# Patient Record
Sex: FEMALE | Race: WHITE | Employment: OTHER | URBAN - METROPOLITAN AREA
[De-identification: names, ages, dates, MRNs, and addresses within clinical notes are randomized per-mention and may not be internally consistent; named-entity substitution may affect disease eponyms.]

---

## 2017-01-23 ENCOUNTER — TRANSFERRED RECORDS (OUTPATIENT)
Dept: HEALTH INFORMATION MANAGEMENT | Facility: CLINIC | Age: 72
End: 2017-01-23

## 2017-01-25 ENCOUNTER — TRANSFERRED RECORDS (OUTPATIENT)
Dept: HEALTH INFORMATION MANAGEMENT | Facility: CLINIC | Age: 72
End: 2017-01-25

## 2017-02-03 ENCOUNTER — TELEPHONE (OUTPATIENT)
Dept: NURSING | Facility: CLINIC | Age: 72
End: 2017-02-03

## 2017-02-03 NOTE — TELEPHONE ENCOUNTER
"Call Type: Triage Call    Presenting Problem: \" I have  had a nose bleed since 12: 50pm, I keep  pinching my nose and packing it with kleenex , then when I pull the  kleenex out, a clot comes out and it starts to bleed again. I use a  CPAP machine, so my nose is always dry. \"Advised to pinch nose for 2  times on the outside for 10 minutes each time,  and not to use  kleenex , if it doesn't stop to go to the ED, as patient is on  Plavix.   Advised to call back with questions and concerns. Patient  holding nose during phone call and stating it has already almost  stopped.  Triage Note:  Guideline Title: Nosebleed  Recommended Disposition: Provide Home/Self Care  Original Inclination: Wanted to speak with a nurse  Override Disposition:  Intended Action: Follow advice given  Physician Contacted: No  Single episode of bleeding AND has not applied constant direct pressure for 10  minutes by a clock ?  YES  Lacerations on nose or face ? NO  New or worsening signs and symptoms that may indicate shock ? NO  Unexplained new bruising on other parts of body or other unexplained bleeding  (from gums, in urine or stool) ? NO  Unconscious now or within last hour OR for more than 5 minutes at time of injury,  after major trauma to head, neck or face ? NO  Recurrent bleeding (3 or more episodes in last week) that stops with 10 minutes of  direct pressure or stops spontaneously ? NO  Nasal bleeding not controlled after 2 attempts of constant direct pressure for a  full 10 minutes by the clock (each attempt) ? NO  Bleeding from one or both nostrils initially controlled with direct pressure AND  another episode of bleeding the same day ? NO  Unbearable headache ? NO  Self measured blood pressure 180/120 or higher ? NO  Blunt and/or penetrating injury to face involving more than one facial structure ?  NO  Nosebleed as a result of trauma to the nose/face ? NO  Blood is draining down back of throat causing choking sensation, " difficulty  breathing, or coughing up or vomiting blood ? NO  Unable to control nosebleed (2 attempts of constant direct pressure for a full 10  minutes by a clock) AND individual is taking blood thinning medications OR has an  underlying condition affecting blood clotting ? NO  Known foreign body in nose and unsuccessful removal attempt ? NO  Nosebleed is now controlled AND individual is taking blood thinning medications OR  has an underlying condition affecting blood clotting ? NO  Physician Instructions:  Care Advice: Call provider if symptoms continue, worsen, or new symptoms  develop.  Keep the head higher than the level of the heart.  Sit up or lie back a  little with the head elevated.  SYMPTOM / CONDITION MANAGEMENT  Nosebleed Care: - Keep person calm  agitation may increase bleeding. - Sit in an upright position and tilt head  forward. - Pinch all the soft parts of the nose just below the bony portion  of the nose with the thumb and side of bent index finger. - Maintain firm  pressure for a full 10 minutes by a clock. - Breathe through the mouth. -  If bleeding is not stopped, apply pressure for another full 10 minutes by a  clock. - Apply a cloth-covered ice pack to nose and cheeks.  GO TO THE ED IMMEDIATELY if bleeding continues after attempts of constant  direct pressure to the nose total 20 minutes. Each attempt should be  constant direct pressure for a full 10 minutes by a clock.  Post Nosebleed Care: * Avoid blowing nose for 12 hours after bleeding  episode. * Do not pick nose. * Do not strain or bend down to lift anything  heavy for 3 days. * Use a cool mist humidifier to moisten room air. *  Elevate head on 3 pillows when lying down for the next 3 days. * Consider  use of nonprescription nasal mucus membrane ointment or spray per label or  pharmacist's instructions

## 2017-02-22 ENCOUNTER — TELEPHONE (OUTPATIENT)
Dept: FAMILY MEDICINE | Facility: CLINIC | Age: 72
End: 2017-02-22

## 2017-02-22 ENCOUNTER — TRANSFERRED RECORDS (OUTPATIENT)
Dept: HEALTH INFORMATION MANAGEMENT | Facility: CLINIC | Age: 72
End: 2017-02-22

## 2017-02-22 ENCOUNTER — OFFICE VISIT (OUTPATIENT)
Dept: FAMILY MEDICINE | Facility: CLINIC | Age: 72
End: 2017-02-22
Payer: COMMERCIAL

## 2017-02-22 VITALS
TEMPERATURE: 97.6 F | SYSTOLIC BLOOD PRESSURE: 132 MMHG | HEART RATE: 96 BPM | DIASTOLIC BLOOD PRESSURE: 74 MMHG | BODY MASS INDEX: 20.8 KG/M2 | HEIGHT: 62 IN | WEIGHT: 113 LBS | OXYGEN SATURATION: 96 %

## 2017-02-22 DIAGNOSIS — M85.80 OSTEOPENIA: Chronic | ICD-10-CM

## 2017-02-22 DIAGNOSIS — L30.1 DYSHIDROSIS: ICD-10-CM

## 2017-02-22 DIAGNOSIS — E78.5 DYSLIPIDEMIA: Primary | ICD-10-CM

## 2017-02-22 DIAGNOSIS — F41.8 DEPRESSION WITH ANXIETY: Primary | ICD-10-CM

## 2017-02-22 DIAGNOSIS — E78.5 DYSLIPIDEMIA: ICD-10-CM

## 2017-02-22 DIAGNOSIS — F51.01 PRIMARY INSOMNIA: ICD-10-CM

## 2017-02-22 LAB
CHOLEST SERPL-MCNC: 205 MG/DL
CK SERPL-CCNC: 42 U/L (ref 30–225)
HDLC SERPL-MCNC: 69 MG/DL
LDLC SERPL CALC-MCNC: 125 MG/DL
NONHDLC SERPL-MCNC: 136 MG/DL
TRIGL SERPL-MCNC: 55 MG/DL

## 2017-02-22 PROCEDURE — 80061 LIPID PANEL: CPT | Performed by: INTERNAL MEDICINE

## 2017-02-22 PROCEDURE — 82306 VITAMIN D 25 HYDROXY: CPT | Performed by: INTERNAL MEDICINE

## 2017-02-22 PROCEDURE — 99213 OFFICE O/P EST LOW 20 MIN: CPT | Performed by: FAMILY MEDICINE

## 2017-02-22 PROCEDURE — 82550 ASSAY OF CK (CPK): CPT | Performed by: INTERNAL MEDICINE

## 2017-02-22 PROCEDURE — 36415 COLL VENOUS BLD VENIPUNCTURE: CPT | Performed by: INTERNAL MEDICINE

## 2017-02-22 NOTE — TELEPHONE ENCOUNTER
Called pharmacy and clarified dispense amount, advised pharmacy to change dispense amount per directions on prescription.  Pharmacy will fill prescription   Galina Garza RN

## 2017-02-22 NOTE — TELEPHONE ENCOUNTER
Reason for Call:  Other prescription    Detailed comments: Patient called to clarify directions for Sertraline. Please call [phone]    Phone Number Patient can be reached at: Home number on file [phone] (home)    Best Time: any    Can we leave a detailed message on this number? YES    Call taken on 2/22/2017 at 11:41 AM by Trinity Powell

## 2017-02-22 NOTE — PROGRESS NOTES
"Chief Complaint   Patient presents with     Derm Problem     Perspiration     Headache     SUBJECTIVE:  This 71 year old female is here today to discuss that she would like to taper off zoloft. She was put on it after her open heart surgery when she had a lot of anxiety and insomnia. She is feeling much better, but now is experiencing sweats at night that wake her up when she gets chilled. She read that zoloft can cause night sweats and she never had night sweats prior to using zoloft. She really wants guidance to taper off of it. She has never had headaches either and is convinced that the zoloft is causing headaches.   She needs lipid panel done today per her cardiologist. She had developed leg cramps on lipitor and finally went off lipitor and the leg cramps resolved. Now cardiologist wants to try crestor but wants to see what her cholesterol is today.   She has a small sharp area under her skin by her right clavicle. That was never there prior to her open heart surgery. She wonders what it could be. She knows she had her chest wired shut after the surgery. Her cardiologist showed her the x-rays.   Patient does a lot of natural relaxation and meditation maneuvers. She is into \"natural healing\" when at all possible. She has a slight rash on her left abdomen.   All other review of systems are negative  Personal, family, and social history reviewed with patient and revised.  OBJECTIVE:  Vital signs:  Temp: 97.6  F (36.4  C)   BP: 132/74 Pulse: 96     SpO2: 96 %     Height: 5' 1.5\" (156.2 cm) Weight: 113 lb (51.3 kg)  Estimated body mass index is 21.01 kg/(m^2) as calculated from the following:    Height as of this encounter: 5' 1.5\" (1.562 m).    Weight as of this encounter: 113 lb (51.3 kg).    Heart: normal rate and rhythm with no mu  Lungs: clear in all fields  Midline chest scar has healed well  She has a tiny sharp area over her medial right clavicle. This could be a small wire coming up under the skin. No need " to explore it at this time.   Her skin is quite dry on her abdomen   Well hydrated  Well nourished  Well groomed  Brisk gait with no shortness of breath   Alert and oriented X 3  Good spirits  ASSESSMENT / PLAN:  (F41.8) Depression with anxiety  (primary encounter diagnosis)  Comment:   PHQ-9 score:    PHQ-9 SCORE 9/14/2016   Total Score -   Total Score 12     Plan: sertraline (ZOLOFT) 50 MG tablet        OK to taper off zoloft over the next 2 weeks.     (F51.01) Primary insomnia  Comment: as above   Plan: sertraline (ZOLOFT) 50 MG tablet         Taper instructions given     (L30.1) Dyshidrosis  Comment: as above   Plan: discussed lotions and oils     SAL RADER M.D.

## 2017-02-22 NOTE — NURSING NOTE
"Chief Complaint   Patient presents with     Derm Problem     Perspiration     Headache       Initial /74 (BP Location: Right arm, Patient Position: Chair, Cuff Size: Adult Regular)  Pulse 96  Temp 97.6  F (36.4  C)  Ht 5' 1.5\" (1.562 m)  Wt 113 lb (51.3 kg)  SpO2 96%  BMI 21.01 kg/m2 Estimated body mass index is 21.01 kg/(m^2) as calculated from the following:    Height as of this encounter: 5' 1.5\" (1.562 m).    Weight as of this encounter: 113 lb (51.3 kg).  Medication Reconciliation: complete   Nikki Juárez MA      "

## 2017-02-22 NOTE — PATIENT INSTRUCTIONS
[facility]    If you have any questions regarding to your visit please contact your care team:       Team Purple:   Clinic Hours Telephone Number   [address]   7am-7pm  Monday - Thursday   7am-5pm  Fridays  [phone]  (Appointment scheduling available 24/7)    Questions about your Visit?   Team Line:  [phone]   Urgent Care - [place] and [facility] - 11am-9pm Monday-Friday Saturday-Sunday- 9am-5pm   [place] - 5pm-9pm Monday-Friday Saturday-Sunday- 9am-5pm  [phone] - [facility]  [phone] - [place]       What options do I have for visits at the clinic other than the traditional office visit?  To expand how we care for you, many of our providers are utilizing electronic visits (e-visits) and telephone visits, when medically appropriate, for interactions with their patients rather than a visit in the clinic.   We also offer nurse visits for many medical concerns. Just like any other service, we will bill your insurance company for this type of visit based on time spent on the phone with your provider. Not all insurance companies cover these visits. Please check with your medical insurance if this type of visit is covered. You will be responsible for any charges that are not paid by your insurance.      E-visits via Swallow Solutions:  generally incur a $35.00 fee.  Telephone visits:  Time spent on the phone: *charged based on time that is spent on the phone in increments of 10 minutes. Estimated cost:   5-10 mins $30.00   11-20 mins. $59.00   21-30 mins. $85.00     Use Iquat (secure email communication and access to your chart) to send your primary care provider a message or make an appointment. Ask someone on your Team how to sign up for Sw[employer].  For a Price Quote for your services, please call our Consumer Price Line at [phone].  As always, Thank you for trusting us with your health care needs!

## 2017-02-22 NOTE — MR AVS SNAPSHOT
After Visit Summary   2/22/2017    Monika Serrano    MRN: [id number]           Patient Information     Date Of Birth          1945        Visit Information        Provider Department      2/22/2017 11:00 AM Jade Valdes MD [facility]        Today's Diagnoses     Depression with anxiety        Primary insomnia          Care Instructions    [facility]    If you have any questions regarding to your visit please contact your care team:       Team Purple:   Clinic Hours Telephone Number   [address] Dr[address]   7am-7pm  Monday - Thursday   7am-5pm  Fridays  [phone]  (Appointment scheduling available 24/7)    Questions about your Visit?   Team Line:  [phone]   [facility] [place] [facility] - 11am-9pm Monday-Friday Saturday-Sunday- 9am-5pm   [place] - 5pm-9pm Monday-Friday Saturday-Sunday- 9am-5pm  [phone] - [place]   [phone] - [place]       What options do I have for visits at the clinic other than the traditional office visit?  To expand how we care for you, many of our providers are utilizing electronic visits (e-visits) and telephone visits, when medically appropriate, for interactions with their patients rather than a visit in the clinic.   We also offer nurse visits for many medical concerns. Just like any other service, we will bill your insurance company for this type of visit based on time spent on the phone with your provider. Not all insurance companies cover these visits. Please check with your medical insurance if this type of visit is covered. You will be responsible for any charges that are not paid by your insurance.      E-visits via Muecs:  generally incur a $35.00 fee.  Telephone visits:  Time spent on the phone: *charged based on time that is spent on the phone in increments of 10 minutes. Estimated cost:   5-10 mins $30.00   11-20 mins.  "$59.00   21-30 mins. $85.00     Use Fanshoutt (secure email communication and access to your chart) to send your primary care provider a message or make an appointment. Ask someone on your Team how to sign up for Fanshoutt.  For a Price Quote for your services, please call our Frazr Price Line at [phone].  As always, Thank you for trusting us with your health care needs!            Follow-ups after your visit        Who to contact     If you have questions or need follow up information about today's clinic visit or your schedule please contact [facility] directly at [phone].  Normal or non-critical lab and imaging results will be communicated to you by InDex Pharmaceuticalshart, letter or phone within 4 business days after the clinic has received the results. If you do not hear from us within 7 days, please contact the clinic through Fanshoutt or phone. If you have a critical or abnormal lab result, we will notify you by phone as soon as possible.  Submit refill requests through Contextors or call your pharmacy and they will forward the refill request to us. Please allow 3 business days for your refill to be completed.          Additional Information About Your Visit        InDex [employer]sharC[employer]R Information     Fanshoutt gives you secure access to your electronic health record. If you see a primary care provider, you can also send messages to your care team and make appointments. If you have questions, please call your primary care clinic.  If you do not have a primary care provider, please call [phone] and they will assist you.        Care EveryWhere ID     This is your Care EveryWhere ID. This could be used by other organizations to access your [facility] medical records  [id number]        Your Vitals Were     Pulse Temperature Height Pulse Oximetry BMI (Body Mass Index)       96 97.6  F (36.4  C) 5' 1.5\" (1.562 m) 96% 21.01 kg/m2        Blood Pressure from Last 3 Encounters:   02/22/17 132/74   12/28/16 118/66   12/01/16 " 132/72    Weight from Last 3 Encounters:   02/22/17 113 lb (51.3 kg)   12/28/16 112 lb (50.8 kg)   12/01/16 113 lb (51.3 kg)              Today, you had the following     No orders found for display         Today's Medication Changes          These changes are accurate as of: 2/22/17 11:33 AM.  If you have any questions, ask your nurse or doctor.               These medicines have changed or have updated prescriptions.        Dose/Directions    sertraline 50 MG tablet   Commonly known as:  ZOLOFT   This may have changed:    - how much to take  - how to take this  - when to take this  - additional instructions   Used for:  Depression with anxiety, Primary insomnia   Changed by:  Jade Valdes MD        Take one half daily for 3 days and then one half every other day for 3 doses and then discontinue   Quantity:  90 tablet   Refills:  4            Where to get your medicines      These medications were sent to [place] [address] [place] [facility] [address]  [address] [facility] [address]     Phone:  [phone]     sertraline 50 MG tablet                Primary Care Provider Office Phone # Fax #    Jade Valdes [phone]       [address] [address]        Thank you!     Thank you for choosing [facility]  for your care. Our goal is always to provide you with excellent care. Hearing back from our patients is one way we can continue to improve our services. Please take a few minutes to complete the written survey that you may receive in the mail after your visit with us. Thank you!             Your Updated Medication List - Protect others around you: Learn how to safely use, store and throw away your medicines at www.disposemymeds.org.          This list is accurate as of: 2/22/17 11:33 AM.  Always use your most recent med list.                   Brand Name Dispense Instructions for use    ATIVAN PO      Take 0.25 mg  by mouth At Bedtime Reported on 2/22/2017       atorvastatin 20 MG tablet    LIPITOR    90 tablet    Take 1 tablet (20 mg) by mouth At Bedtime       clopidogrel 75 MG tablet    PLAVIX    30 tablet    Take 1 tablet (75 mg) by mouth daily Take for 3 months following surgery then stop medication when out of refills       MAGNESIUM OXIDE PO      Take 200 mg by mouth daily       metoprolol 25 MG tablet    LOPRESSOR    180 tablet    Take 1 tablet (25 mg) by mouth 2 times daily       order for DME      Auto-cpap 6-10       sertraline 50 MG tablet    ZOLOFT    90 tablet    Take one half daily for 3 days and then one half every other day for 3 doses and then discontinue       VITAMIN B 12 PO      Take 1,000 mcg by mouth daily       VITAMIN D3 PO      Take 400 Units by mouth daily

## 2017-02-23 LAB — DEPRECATED CALCIDIOL+CALCIFEROL SERPL-MC: 44 UG/L (ref 20–75)

## 2017-04-18 ENCOUNTER — TRANSFERRED RECORDS (OUTPATIENT)
Dept: HEALTH INFORMATION MANAGEMENT | Facility: CLINIC | Age: 72
End: 2017-04-18

## 2017-05-04 ENCOUNTER — TRANSFERRED RECORDS (OUTPATIENT)
Dept: HEALTH INFORMATION MANAGEMENT | Facility: CLINIC | Age: 72
End: 2017-05-04

## 2017-08-22 ENCOUNTER — ALLIED HEALTH/NURSE VISIT (OUTPATIENT)
Dept: NURSING | Facility: CLINIC | Age: 72
End: 2017-08-22
Payer: COMMERCIAL

## 2017-08-22 DIAGNOSIS — H61.23 BILATERAL IMPACTED CERUMEN: Primary | ICD-10-CM

## 2017-08-22 PROCEDURE — 99207 ZZC NO CHARGE NURSE ONLY: CPT

## 2017-08-22 NOTE — NURSING NOTE
"Chief Complaint   Patient presents with     Ear Lavage     Bilateral ear lavage       Initial There were no vitals taken for this visit. Estimated body mass index is 21.01 kg/(m^2) as calculated from the following:    Height as of 2/22/17: 5' 1.5\" (1.562 m).    Weight as of 2/22/17: 113 lb (51.3 kg).  Medication Reconciliation: unable or not appropriate to perform   Patient came in for bilateral ear lavage. Ear lavage completed using like warm water, peroxide and elephant ear flush. RN verified before and after.  Adelaida WASHINGTON CMA (AAMA)      "

## 2017-08-22 NOTE — MR AVS SNAPSHOT
After Visit Summary   8/22/2017    Monika Serrano    MRN: [id number]           Patient Information     Date Of Birth          1945        Visit Information        Provider Department      8/22/2017 1:30 PM [facility]        Today's Diagnoses     Bilateral impacted cerumen    -  1       Follow-ups after your visit        Who to contact     If you have questions or need follow up information about today's clinic visit or your schedule please contact [facility] directly at [phone].  Normal or non-critical lab and imaging results will be communicated to you by Rocket Raisehart, letter or phone within 4 business days after the clinic has received the results. If you do not hear from us within 7 days, please contact the clinic through Rocket Raisehart or phone. If you have a critical or abnormal lab result, we will notify you by phone as soon as possible.  Submit refill requests through Peak [employer]ing [employer] or call your pharmacy and they will forward the refill request to us. Please allow 3 business days for your refill to be completed.          Additional Information About Your Visit        MyChart Information     [employer] gives you secure access to your electronic health record. If you see a primary care provider, you can also send messages to your care team and make appointments. If you have questions, please call your primary care clinic.  If you do not have a primary care provider, please call [phone] and they will assist you.        Care EveryWhere ID     This is your Care EveryWhere ID. This could be used by other organizations to access your [facility] medical records  [id number]         Blood Pressure from Last 3 Encounters:   02/22/17 132/74   12/28/16 118/66   12/01/16 132/72    Weight from Last 3 Encounters:   02/22/17 113 lb (51.3 kg)   12/28/16 112 lb (50.8 kg)   12/01/16 113 lb (51.3 kg)              We Performed the Following     REMOVE IMPACTED CERUMEN         Primary Care Provider Office Phone # Fax #    Jade Valdes [phone]       [address] [address]        Equal Access to Services     HARSHIL MEJIAS : Theodore tulio alvarez manfredo Sodeniaali, waaxda luqadaha, qaybta kaalmada adejordynda, anastasia leein hayaasegundo yadavwalt medina haresh lewis. So [place] [phone].    ATENCIÓN: Si habla español, tiene a caballero disposición servicios gratuitos de asistencia lingüística. Llame al [phone].    We comply with applicable federal civil rights laws and [place] laws. We do not discriminate on the basis of race, color, national origin, age, disability sex, sexual orientation or gender identity.            Thank you!     Thank you for choosing [facility]  for your care. Our goal is always to provide you with excellent care. Hearing back from our patients is one way we can continue to improve our services. Please take a few minutes to complete the written survey that you may receive in the mail after your visit with us. Thank you!             Your Updated Medication List - Protect others around you: Learn how to safely use, store and throw away your medicines at www.disposemymeds.org.          This list is accurate as of: 8/22/17  1:53 PM.  Always use your most recent med list.                   Brand Name Dispense Instructions for use Diagnosis    ATIVAN PO      Take 0.25 mg by mouth At Bedtime Reported on 2/22/2017        atorvastatin 20 MG tablet    LIPITOR    90 tablet    Take 1 tablet (20 mg) by mouth At Bedtime    S/P aortic valve replacement       clopidogrel 75 MG tablet    PLAVIX    30 tablet    Take 1 tablet (75 mg) by mouth daily Take for 3 months following surgery then stop medication when out of refills    S/P aortic valve replacement       MAGNESIUM OXIDE PO      Take 200 mg by mouth daily        metoprolol 25 MG tablet    LOPRESSOR    180 tablet    Take 1 tablet (25 mg) by mouth 2 times daily    S/P  aortic valve replacement       order for DME      Auto-cpap 6-10    HAM (obstructive sleep apnea)       sertraline 50 MG tablet    ZOLOFT    90 tablet    Take one half daily for 3 days and then one half every other day for 3 doses and then discontinue    Depression with anxiety, Primary insomnia       VITAMIN B 12 PO      Take 1,000 mcg by mouth daily        VITAMIN D3 PO      Take 400 Units by mouth daily

## 2017-11-30 ENCOUNTER — OFFICE VISIT (OUTPATIENT)
Dept: FAMILY MEDICINE | Facility: CLINIC | Age: 72
End: 2017-11-30
Payer: COMMERCIAL

## 2017-11-30 VITALS
HEART RATE: 79 BPM | WEIGHT: 122.5 LBS | TEMPERATURE: 97.1 F | SYSTOLIC BLOOD PRESSURE: 120 MMHG | BODY MASS INDEX: 23.13 KG/M2 | HEIGHT: 61 IN | DIASTOLIC BLOOD PRESSURE: 72 MMHG | OXYGEN SATURATION: 98 %

## 2017-11-30 DIAGNOSIS — H61.22 IMPACTED CERUMEN OF LEFT EAR: Primary | ICD-10-CM

## 2017-11-30 PROCEDURE — 99212 OFFICE O/P EST SF 10 MIN: CPT | Performed by: FAMILY MEDICINE

## 2017-11-30 NOTE — NURSING NOTE
"Chief Complaint   Patient presents with     Plugged Ears     cold x 1 week, Ear wash       Initial /72  Pulse 79  Temp 97.1  F (36.2  C) (Oral)  Ht 5' 1\" (1.549 m)  Wt 122 lb 8 oz (55.6 kg)  SpO2 98%  BMI 23.15 kg/m2 Estimated body mass index is 23.15 kg/(m^2) as calculated from the following:    Height as of this encounter: 5' 1\" (1.549 m).    Weight as of this encounter: 122 lb 8 oz (55.6 kg).  Medication Reconciliation: complete    "

## 2017-11-30 NOTE — PATIENT INSTRUCTIONS
[facility]    If you have any questions regarding to your visit please contact your care team:       Team Purple:   Clinic Hours Telephone Number   Dr. Jade Nuñez   7am-7pm  Monday - Thursday   7am-5pm  Fridays  [phone]  (Appointment scheduling available 24/7)    Questions about your Visit?   Team Line:  [phone]   Urgent Care - [place] and [facility] - 11am-9pm Monday-Friday Saturday-Sunday- 9am-5pm   [place] - 5pm-9pm Monday-Friday Saturday-Sunday- 9am-5pm  [phone] - [facility]  [phone] - [place]       What options do I have for visits at the clinic other than the traditional office visit?  To expand how we care for you, many of our providers are utilizing electronic visits (e-visits) and telephone visits, when medically appropriate, for interactions with their patients rather than a visit in the clinic.   We also offer nurse visits for many medical concerns. Just like any other service, we will bill your insurance company for this type of visit based on time spent on the phone with your provider. Not all insurance companies cover these visits. Please check with your medical insurance if this type of visit is covered. You will be responsible for any charges that are not paid by your insurance.      E-visits via Smarp:  generally incur a $35.00 fee.  Telephone visits:  Time spent on the phone: *charged based on time that is spent on the phone in increments of 10 minutes. Estimated cost:   5-10 mins $30.00   11-20 mins. $59.00   21-30 mins. $85.00     Use NovaPlannerhart (secure email communication and access to your chart) to send your primary care provider a message or make an appointment. Ask someone on your Team how to sign up for Smarp.  For a Price Quote for your services, please call our Consumer Price Line at [phone].  As always, Thank you for trusting us with your health care  needs!    Discharged By: An

## 2017-11-30 NOTE — PROGRESS NOTES
"  SUBJECTIVE:   Monika Serrano is a 72 year old female who presents to clinic today for the following health issues:    Patient presents with:  Plugged Ears: cold x 1 week, Ear wash    Patient with recent URI now with decreased hearing sensation.  Cerumen impaction bilaterally, would like it cleaned out.    Problem list and histories reviewed & adjusted, as indicated.  Additional history: as documented    BP Readings from Last 3 Encounters:   11/30/17 120/72   02/22/17 132/74   12/28/16 118/66    Wt Readings from Last 3 Encounters:   11/30/17 122 lb 8 oz (55.6 kg)   02/22/17 113 lb (51.3 kg)   12/28/16 112 lb (50.8 kg)         Reviewed and updated as needed this visit by clinical staffTobacco  Allergies  Meds  Problems  Med Hx  Surg Hx  Fam Hx  Soc Hx        Reviewed and updated as needed this visit by Provider  Allergies  Meds  Problems         ROS:  Constitutional, HEENT, cardiovascular, pulmonary, gi and gu systems are negative, except as otherwise noted.      OBJECTIVE:   /72  Pulse 79  Temp 97.1  F (36.2  C) (Oral)  Ht 5' 1\" (1.549 m)  Wt 122 lb 8 oz (55.6 kg)  SpO2 98%  BMI 23.15 kg/m2  Body mass index is 23.15 kg/(m^2).  GENERAL: healthy, alert and no distress  EYES: Eyes grossly normal to inspection, PERRL and conjunctivae and sclerae normal  HENT: bilateral cerumen impaction, after cleaning left TM normal, unable to visualize right TM.  NECK: no adenopathy, no asymmetry, masses, or scars and thyroid normal to palpation  RESP: lungs clear to auscultation - no rales, rhonchi or wheezes  CV: regular rate and rhythm, normal S1 S2, no S3 or S4, no murmur, click or rub, no peripheral edema and peripheral pulses strong  SKIN: no suspicious lesions or rashes  NEURO: Normal strength and tone, mentation intact and speech normal  PSYCH: mentation appears normal, affect normal/bright    ASSESSMENT/PLAN:     1. Impacted cerumen of left ear  - REMOVE IMPACTED CERUMEN    Follow-up as " needed    Efrain Nuñez MD  [facility]

## 2017-11-30 NOTE — MR AVS SNAPSHOT
After Visit Summary   11/30/2017    Monika Serrano    MRN: [id number]           Patient Information     Date Of Birth          1945        Visit Information        Provider Department      11/30/2017 12:20 PM Efrain Nuñez MD [facility]        Today's Diagnoses     Impacted cerumen of left ear    -  1      Care Instructions    [facility]    If you have any questions regarding to your visit please contact your care team:       Team Purple:   Clinic Hours Telephone Number   Dr. Jade Nuñez   7am-7pm  Monday - Thursday   7am-5pm  Fridays  [phone]  (Appointment scheduling available 24/7)    Questions about your Visit?   Team Line:  [phone]   [facility] [place] [facility] 11am-9pm Monday-Friday Saturday-Sunday- 9am-5pm   [place] - 5pm-9pm Monday-Friday Saturday-Sunday- 9am-5pm  [phone] - [facility]  [phone] - [place]       What options do I have for visits at the clinic other than the traditional office visit?  To expand how we care for you, many of our providers are utilizing electronic visits (e-visits) and telephone visits, when medically appropriate, for interactions with their patients rather than a visit in the clinic.   We also offer nurse visits for many medical concerns. Just like any other service, we will bill your insurance company for this type of visit based on time spent on the phone with your provider. Not all insurance companies cover these visits. Please check with your medical insurance if this type of visit is covered. You will be responsible for any charges that are not paid by your insurance.      E-visits via Social Shop:  generally incur a $35.00 fee.  Telephone visits:  Time spent on the phone: *charged based on time that is spent on the phone in increments of 10 minutes. Estimated cost:   5-10 mins $30.00   11-20 mins.  "$59.00   21-30 mins. $85.00     Use HESKAhart (secure email communication and access to your chart) to send your primary care provider a message or make an appointment. Ask someone on your Team how to sign up for HESKAhart.  For a Price Quote for your services, please call our 360pi Price Line at [phone].  As always, Thank you for trusting us with your health care needs!    Discharged By: An            Follow-ups after your visit        Who to contact     If you have questions or need follow up information about today's clinic visit or your schedule please contact [facility] directly at [phone].  Normal or non-critical lab and imaging results will be communicated to you by MyChart, letter or phone within 4 business days after the clinic has received the results. If you do not hear from us within 7 days, please contact the clinic through Tradat or phone. If you have a critical or abnormal lab result, we will notify you by phone as soon as possible.  Submit refill requests through Azzure IT or call your pharmacy and they will forward the refill request to us. Please allow 3 business days for your refill to be completed.          Additional Information About Your Visit        HESKAhart Information     Tradat gives you secure access to your electronic health record. If you see a primary care provider, you can also send messages to your care team and make appointments. If you have questions, please call your primary care clinic.  If you do not have a primary care provider, please call [phone] and they will assist you.        Care EveryWhere ID     This is your Care EveryWhere ID. This could be used by other organizations to access your [place] medical records  [id number]        Your Vitals Were     Pulse Temperature Height Pulse Oximetry BMI (Body Mass Index)       79 97.1  F (36.2  C) (Oral) 5' 1\" (1.549 m) 98% 23.15 kg/m2        Blood Pressure from Last 3 Encounters:   11/30/17 120/72 "   02/22/17 132/74   12/28/16 118/66    Weight from Last 3 Encounters:   11/30/17 122 lb 8 oz (55.6 kg)   02/22/17 113 lb (51.3 kg)   12/28/16 112 lb (50.8 kg)              We Performed the Following     REMOVE IMPACTED CERUMEN        Primary Care Provider Office Phone # Fax #    Jade Valdes [phone]       [address] [address]        Equal Access to Services     [facility] HospitalYOUSIF : Hadii aad ku hadasho Soomaali, waaxda luqadaha, qaybta kaalmada adeegyada, waxay leein hayamauryn bertin hutson . So [facility] [phone].    ATENCIÓN: Si habla español, tiene a caballero disposición servicios gratuitos de asistencia lingüística. [facility] [phone].    We comply with applicable federal civil rights laws and [place] laws. We do not discriminate on the basis of race, color, national origin, age, disability, sex, sexual orientation, or gender identity.            Thank you!     Thank you for choosing [facility]  for your care. Our goal is always to provide you with excellent care. Hearing back from our patients is one way we can continue to improve our services. Please take a few minutes to complete the written survey that you may receive in the mail after your visit with us. Thank you!             Your Updated Medication List - Protect others around you: Learn how to safely use, store and throw away your medicines at www.disposemymeds.org.          This list is accurate as of: 11/30/17  1:16 PM.  Always use your most recent med list.                   Brand Name Dispense Instructions for use Diagnosis    ATIVAN PO      Take 0.25 mg by mouth At Bedtime Reported on 2/22/2017        atorvastatin 20 MG tablet    LIPITOR    90 tablet    Take 1 tablet (20 mg) by mouth At Bedtime    S/P aortic valve replacement       clopidogrel 75 MG tablet    PLAVIX    30 tablet    Take 1 tablet (75 mg) by mouth daily Take for 3 months following surgery then  stop medication when out of refills    S/P aortic valve replacement       MAGNESIUM OXIDE PO      Take 200 mg by mouth daily        metoprolol 25 MG tablet    LOPRESSOR    180 tablet    Take 1 tablet (25 mg) by mouth 2 times daily    S/P aortic valve replacement       order for DME      Auto-cpap 6-10    HAM (obstructive sleep apnea)       sertraline 50 MG tablet    ZOLOFT    90 tablet    Take one half daily for 3 days and then one half every other day for 3 doses and then discontinue    Depression with anxiety, Primary insomnia       VITAMIN B 12 PO      Take 1,000 mcg by mouth daily        VITAMIN D3 PO      Take 400 Units by mouth daily

## 2018-03-12 ENCOUNTER — OFFICE VISIT (OUTPATIENT)
Dept: FAMILY MEDICINE | Facility: CLINIC | Age: 73
End: 2018-03-12
Payer: COMMERCIAL

## 2018-03-12 VITALS
RESPIRATION RATE: 16 BRPM | DIASTOLIC BLOOD PRESSURE: 80 MMHG | WEIGHT: 126.2 LBS | BODY MASS INDEX: 23.83 KG/M2 | HEART RATE: 74 BPM | HEIGHT: 61 IN | SYSTOLIC BLOOD PRESSURE: 138 MMHG | TEMPERATURE: 97 F | OXYGEN SATURATION: 97 %

## 2018-03-12 DIAGNOSIS — Z91.81 AT RISK FOR FALLING: ICD-10-CM

## 2018-03-12 DIAGNOSIS — Z00.00 ENCOUNTER FOR ROUTINE ADULT HEALTH EXAMINATION WITHOUT ABNORMAL FINDINGS: Primary | ICD-10-CM

## 2018-03-12 DIAGNOSIS — Z95.2 S/P AORTIC VALVE REPLACEMENT: ICD-10-CM

## 2018-03-12 DIAGNOSIS — Z12.31 ENCOUNTER FOR SCREENING MAMMOGRAM FOR BREAST CANCER: ICD-10-CM

## 2018-03-12 DIAGNOSIS — E78.5 HYPERLIPIDEMIA WITH TARGET LDL LESS THAN 160: Chronic | ICD-10-CM

## 2018-03-12 LAB
ANION GAP SERPL CALCULATED.3IONS-SCNC: 6 MMOL/L (ref 3–14)
BUN SERPL-MCNC: 20 MG/DL (ref 7–30)
CALCIUM SERPL-MCNC: 9.2 MG/DL (ref 8.5–10.1)
CHLORIDE SERPL-SCNC: 105 MMOL/L (ref 94–109)
CHOLEST SERPL-MCNC: 240 MG/DL
CO2 SERPL-SCNC: 27 MMOL/L (ref 20–32)
CREAT SERPL-MCNC: 1.2 MG/DL (ref 0.52–1.04)
GFR SERPL CREATININE-BSD FRML MDRD: 44 ML/MIN/1.7M2
GLUCOSE SERPL-MCNC: 98 MG/DL (ref 70–99)
HDLC SERPL-MCNC: 67 MG/DL
LDLC SERPL CALC-MCNC: 148 MG/DL
NONHDLC SERPL-MCNC: 173 MG/DL
POTASSIUM SERPL-SCNC: 4.7 MMOL/L (ref 3.4–5.3)
SODIUM SERPL-SCNC: 138 MMOL/L (ref 133–144)
TRIGL SERPL-MCNC: 124 MG/DL

## 2018-03-12 PROCEDURE — 80061 LIPID PANEL: CPT | Performed by: FAMILY MEDICINE

## 2018-03-12 PROCEDURE — 80048 BASIC METABOLIC PNL TOTAL CA: CPT | Performed by: FAMILY MEDICINE

## 2018-03-12 PROCEDURE — 99397 PER PM REEVAL EST PAT 65+ YR: CPT | Performed by: FAMILY MEDICINE

## 2018-03-12 PROCEDURE — 36415 COLL VENOUS BLD VENIPUNCTURE: CPT | Performed by: FAMILY MEDICINE

## 2018-03-12 RX ORDER — METOPROLOL TARTRATE 25 MG/1
TABLET, FILM COATED ORAL
Qty: 90 TABLET | Refills: 4 | Status: SHIPPED | OUTPATIENT
Start: 2018-03-12 | End: 2018-07-25 | Stop reason: DRUGHIGH

## 2018-03-12 ASSESSMENT — ANXIETY QUESTIONNAIRES
1. FEELING NERVOUS, ANXIOUS, OR ON EDGE: NOT AT ALL
5. BEING SO RESTLESS THAT IT IS HARD TO SIT STILL: NOT AT ALL
7. FEELING AFRAID AS IF SOMETHING AWFUL MIGHT HAPPEN: NOT AT ALL
6. BECOMING EASILY ANNOYED OR IRRITABLE: SEVERAL DAYS
GAD7 TOTAL SCORE: 2
3. WORRYING TOO MUCH ABOUT DIFFERENT THINGS: NOT AT ALL
2. NOT BEING ABLE TO STOP OR CONTROL WORRYING: NOT AT ALL

## 2018-03-12 ASSESSMENT — ACTIVITIES OF DAILY LIVING (ADL)
I_NEED_ASSISTANCE_FOR_THE_FOLLOWING_DAILY_ACTIVITIES:: NO ASSISTANCE IS NEEDED
CURRENT_FUNCTION: NO ASSISTANCE NEEDED

## 2018-03-12 ASSESSMENT — PATIENT HEALTH QUESTIONNAIRE - PHQ9: 5. POOR APPETITE OR OVEREATING: SEVERAL DAYS

## 2018-03-12 NOTE — MR AVS SNAPSHOT
After Visit Summary   3/12/2018    Monika Serrano    MRN: [id number]           Patient Information     Date Of Birth          1945        Visit Information        Provider Department      3/12/2018 11:00 AM Jade Valdes MD [facility]        Today's Diagnoses     Encounter for routine adult health examination without abnormal findings    -  1    S/P aortic valve replacement        Hyperlipidemia with target LDL less than 160        At risk for falling        Encounter for screening mammogram for breast cancer          Care Instructions      Preventive Health Recommendations    Female Ages 65 +    Yearly exam:     See your health care provider every year in order to  o Review health changes.   o Discuss preventive care.    o Review your medicines if your doctor has prescribed any.      You no longer need a yearly Pap test unless you've had an abnormal Pap test in the past 10 years. If you have vaginal symptoms, such as bleeding or discharge, be sure to talk with your provider about a Pap test.      Every 1 to 2 years, have a mammogram.  If you are over 69, talk with your health care provider about whether or not you want to continue having screening mammograms.      Every 10 years, have a colonoscopy. Or, have a yearly FIT test (stool test). These exams will check for colon cancer.       Have a cholesterol test every 5 years, or more often if your doctor advises it.       Have a diabetes test (fasting glucose) every three years. If you are at risk for diabetes, you should have this test more often.       At age 65, have a bone density scan (DEXA) to check for osteoporosis (brittle bone disease).    Shots:    Get a flu shot each year.    Get a tetanus shot every 10 years.    Talk to your doctor about your pneumonia vaccines. There are now two you should receive - Pneumovax (PPSV 23) and Prevnar (PCV 13).    Talk to your doctor about the shingles vaccine.    Talk to your doctor  about the hepatitis B vaccine.    Nutrition:     Eat at least 5 servings of fruits and vegetables each day.      Eat whole-grain bread, whole-wheat pasta and brown rice instead of white grains and rice.      Talk to your provider about Calcium and Vitamin D.     Lifestyle    Exercise at least 150 minutes a week (30 minutes a day, 5 days a week). This will help you control your weight and prevent disease.      Limit alcohol to one drink per day.      No smoking.       Wear sunscreen to prevent skin cancer.       See your dentist twice a year for an exam and cleaning.      See your eye doctor every 1 to 2 years to screen for conditions such as glaucoma, macular degeneration and cataracts.  [facility]    If you have any questions regarding to your visit please contact your care team:       Team Purple:   Clinic Hours Telephone Number   Dr. Jade Nuñez   7am-7pm  Monday - Thursday   7am-5pm  Fridays  [phone]  (Appointment scheduling available 24/7)    Questions about your Visit?   Team Line:  [phone]   Urgent Care - [place] and [facility] - 11am-9pm Monday-Friday Saturday-Sunday- 9am-5pm   [place] - 5pm-9pm Monday-Friday Saturday-Sunday- 9am-5pm  [phone] - [facility]  [phone] - [place]       What options do I have for visits at the clinic other than the traditional office visit?  To expand how we care for you, many of our providers are utilizing electronic visits (e-visits) and telephone visits, when medically appropriate, for interactions with their patients rather than a visit in the clinic.   We also offer nurse visits for many medical concerns. Just like any other service, we will bill your insurance company for this type of visit based on time spent on the phone with your provider. Not all insurance companies cover these visits. Please check with your medical insurance if this type of visit is  covered. You will be responsible for any charges that are not paid by your insurance.      E-visits via ElephantTalk Communicationshart:  generally incur a $35.00 fee.  Telephone visits:  Time spent on the phone: *charged based on time that is spent on the phone in increments of 10 minutes. Estimated cost:   5-10 mins $30.00   11-20 mins. $59.00   21-30 mins. $85.00     Use ElephantTalk Communicationshart (secure email communication and access to your chart) to send your primary care provider a message or make an appointment. Ask someone on your Team how to sign up for Spangle.  For a Price Quote for your services, please call our Consumer Price Line at [phone].  As always, Thank you for trusting us with your health care needs!      [facility]    If you have any questions regarding to your visit please contact your care team:       Team Purple:   Clinic Hours Telephone Number   Dr. Jade Nuñez   7am-7pm  Monday - Thursday   7am-5pm  Fridays  [phone]  (Appointment scheduling available 24/7)    Questions about your Visit?   Team Line:  [phone]   Urgent Care - [place] - 11am-9pm Monday-Friday Saturday-Sunday- 9am-5pm   [place] - 5pm-9pm Monday-Friday Saturday-Sunday- 9am-5pm  [phone] - [place]   [phone] - [place]       What options do I have for visits at the clinic other than the traditional office visit?  To expand how we care for you, many of our providers are utilizing electronic visits (e-visits) and telephone visits, when medically appropriate, for interactions with their patients rather than a visit in the clinic.   We also offer nurse visits for many medical concerns. Just like any other service, we will bill your insurance company for this type of visit based on time spent on the phone with your provider. Not all insurance companies cover these visits. Please check with your medical insurance if this type of visit is  covered. You will be responsible for any charges that are not paid by your insurance.      E-visits via Precom Information Systemshart:  generally incur a $35.00 fee.  Telephone visits:  Time spent on the phone: *charged based on time that is spent on the phone in increments of 10 minutes. Estimated cost:   5-10 mins $30.00   11-20 mins. $59.00   21-30 mins. $85.00     Use Maicoint (secure email communication and access to your chart) to send your primary care provider a message or make an appointment. Ask someone on your Team how to sign up for CX.  For a Price Quote for your services, please call our [email or web address] Line at [phone].  As always, Thank you for trusting us with your health care needs!                  Follow-ups after your visit        Future tests that were ordered for you today     Open Future Orders        Priority Expected Expires Ordered    *MA Screening Digital Bilateral Routine  3/12/2019 3/12/2018            Who to contact     If you have questions or need follow up information about today's clinic visit or your schedule please contact [facility] directly at [phone].  Normal or non-critical lab and imaging results will be communicated to you by Precom Information Systemshart, letter or phone within 4 business days after the clinic has received the results. If you do not hear from us within 7 days, please contact the clinic through Precom Information Systemshart or phone. If you have a critical or abnormal lab result, we will notify you by phone as soon as possible.  Submit refill requests through CX or call your pharmacy and they will forward the refill request to us. Please allow 3 business days for your refill to be completed.          Additional Information About Your Visit        Precom Information Systemshart Information     CX gives you secure access to your electronic health record. If you see a primary care provider, you can also send messages to your care team and make appointments. If you have questions, please call your primary care  "clinic.  If you do not have a primary care provider, please call [phone] and they will assist you.        Care EveryWhere ID     This is your Care EveryWhere ID. This could be used by other organizations to access your [place] medical records  [id number]        Your Vitals Were     Pulse Temperature Respirations Height Pulse Oximetry BMI (Body Mass Index)    74 97  F (36.1  C) (Oral) 16 5' 1.42\" (1.56 m) 97% 23.52 kg/m2       Blood Pressure from Last 3 Encounters:   03/12/18 138/80   11/30/17 120/72   02/22/17 132/74    Weight from Last 3 Encounters:   03/12/18 126 lb 3.2 oz (57.2 kg)   11/30/17 122 lb 8 oz (55.6 kg)   02/22/17 113 lb (51.3 kg)              We Performed the Following     Basic metabolic panel     DEPRESSION ACTION PLAN (DAP)     Lipid panel reflex to direct LDL Fasting          Today's Medication Changes          These changes are accurate as of 3/12/18 11:50 AM.  If you have any questions, ask your nurse or doctor.               These medicines have changed or have updated prescriptions.        Dose/Directions    metoprolol tartrate 25 MG tablet   Commonly known as:  LOPRESSOR   This may have changed:    - how much to take  - how to take this  - when to take this  - additional instructions   Used for:  S/P aortic valve replacement   Changed by:  Jade Valdes MD        Take 1/2 tablet twice a day   Quantity:  90 tablet   Refills:  4            Where to get your medicines      These medications were sent to [place] [address] [facility] [place] [address]  [address]  [address] [place] [address]     Phone:  [phone]     metoprolol tartrate 25 MG tablet                Primary Care Provider Office Phone # Fax #    Jade Valdes [phone]       [place] [id number] [facility]  [place] [address]        Equal Access to Services     HARSHIL MEJIAS AH: Hadii tulio alvarez hadasho Soomaali, waaxda luqadaha, qaybta kaalmada adeegyada, anastasia galeana " bertin felderdeborahyoan blandonaasegundo ah. So [facility] [phone].    ATENCIÓN: Si habla cheng, tiene a caballero disposición servicios gratuitos de asistencia lingüística. Richie al [phone].    We comply with applicable federal civil rights laws and [place] laws. We do not discriminate on the basis of race, color, national origin, age, disability, sex, sexual orientation, or gender identity.            Thank you!     [facility] you for choosing [facility]  for your [facility]. Our goal is always to provide you with excellent care. Hearing back from our patients is one way we can continue to improve our services. Please take a few minutes to complete the written survey that you may receive in the mail after your visit with us. Thank you!             Your Updated Medication List - Protect others around you: Learn how to safely use, store and throw away your medicines at www.disposemymeds.org.          This list is accurate as of 3/12/18 11:50 AM.  Always use your most recent med list.                   Brand Name Dispense Instructions for use Diagnosis    MAGNESIUM OXIDE PO      Take 200 mg by mouth as needed        metoprolol tartrate 25 MG tablet    LOPRESSOR    90 tablet    Take 1/2 tablet twice a day    S/P aortic valve replacement       order for DME      Auto-cpap 6-10    HAM (obstructive sleep apnea)       VITAMIN B 12 PO      Take 1,000 mcg by mouth daily        VITAMIN D3 PO      Take 400 Units by mouth as needed

## 2018-03-12 NOTE — LETTER
[address] [place] [address]    March 14, 2018    Monika CORLEY Serrano  [address] [facility] [place] [address]          Dear Monika,    Your lab tests are showing that your kidneys are not working as well as they have been. This can happen when one ages. Be sure to drink plenty of water every day to keep your kidneys healthy. Avoid taking Ibuprofen or Aleve as they are hard on the kidneys. Your cholesterol is high, but you do have a good amount of health HDL cholesterol. Continue your healthy lifestyle    Enclosed is a copy of your results.     Results for orders placed or performed in visit on 03/12/18   Lipid panel reflex to direct LDL Fasting   Result Value Ref Range    Cholesterol 240 (H) <200 mg/dL    Triglycerides 124 <150 mg/dL    HDL Cholesterol 67 >49 mg/dL    LDL Cholesterol Calculated 148 (H) <100 mg/dL    Non HDL Cholesterol 173 (H) <130 mg/dL   Basic metabolic panel   Result Value Ref Range    Sodium 138 133 - 144 mmol/L    Potassium 4.7 3.4 - 5.3 mmol/L    Chloride 105 94 - 109 mmol/L    Carbon Dioxide 27 20 - 32 mmol/L    Anion Gap 6 3 - 14 mmol/L    Glucose 98 70 - 99 mg/dL    Urea Nitrogen 20 7 - 30 mg/dL    Creatinine 1.20 (H) 0.52 - 1.04 mg/dL    GFR Estimate 44 (L) >60 mL/min/1.7m2    GFR Estimate If Black 53 (L) >60 mL/min/1.7m2    Calcium 9.2 8.5 - 10.1 mg/dL       If you have any questions or concerns, please call myself or my nurse at [phone].      Sincerely,        Jade Valdes MD/niurka

## 2018-03-12 NOTE — LETTER
My Depression Action Plan  Name: Monika Serrano   Date of Birth 1945  Date: 3/12/2018    My doctor: Jaed Valdes   My clinic: [address]  [place] [address]  [phone]          GREEN    ZONE   Good Control    What it looks like:     Things are going generally well. You have normal up s and down s. You may even feel depressed from time to time, but bad moods usually last less than a day.   What you need to do:  1. Continue to care for yourself (see self care plan)  2. Check your depression survival kit and update it as needed  3. Follow your physician s recommendations including any medication.  4. Do not stop taking medication unless you consult with your physician first.           YELLOW         ZONE Getting Worse    What it looks like:     Depression is starting to interfere with your life.     It may be hard to get out of bed; you may be starting to isolate yourself from others.    Symptoms of depression are starting to last most all day and this has happened for several days.     You may have suicidal thoughts but they are not constant.   What you need to do:     1. Call your care team, your response to treatment will improve if you keep your care team informed of your progress. Yellow periods are signs an adjustment may need to be made.     2. Continue your self-care, even if you have to fake it!    3. Talk to someone in your support network    4. Open up your depression survival kit           RED    ZONE Medical Alert - Get Help    What it looks like:     Depression is seriously interfering with your life.     You may experience these or other symptoms: You can t get out of bed most days, can t work or engage in other necessary activities, you have trouble taking care of basic hygiene, or basic responsibilities, thoughts of suicide or death that will not go away, self-injurious behavior.     What you need to do:  1. Call your care team and  request a same-day appointment. If they are not available (weekends or after hours) call your local crisis line, emergency room or 911.      Electronically signed by: SAL RADER, March 12, 2018    Depression Self Care Plan / Survival Kit    Self-Care for Depression  Here s the deal. Your body and mind are really not as separate as most people think.  What you do and think affects how you feel and how you feel influences what you do and think. This means if you do things that people who feel good do, it will help you feel better.  Sometimes this is all it takes.  There is also a place for medication and therapy depending on how severe your depression is, so be sure to consult with your medical provider and/ or Behavioral Health Consultant if your symptoms are worsening or not improving.     In order to better manage my stress, I will:    Exercise  Get some form of exercise, every day. This will help reduce pain and release endorphins, the  feel good  chemicals in your brain. This is almost as good as taking antidepressants!  This is not the same as joining a gym and then never going! (they count on that by the way ) It can be as simple as just going for a walk or doing some gardening, anything that will get you moving.      Hygiene   Maintain good hygiene (Get out of bed in the morning, Make your bed, Brush your teeth, Take a shower, and Get dressed like you were going to work, even if you are unemployed).  If your clothes don't fit try to get ones that do.    Diet  I will strive to eat foods that are good for me, drink plenty of water, and avoid excessive sugar, caffeine, alcohol, and other mood-altering substances.  Some foods that are helpful in depression are: complex carbohydrates, B vitamins, flaxseed, fish or fish oil, fresh fruits and vegetables.    Psychotherapy  I agree to participate in Individual Therapy (if recommended).    Medication  If prescribed medications, I agree to take them.  Missing  doses can result in serious side effects.  I understand that drinking alcohol, or other illicit drug use, may cause potential side effects.  I will not stop my medication abruptly without first discussing it with my provider.    Staying Connected With Others  I will stay in touch with my friends, family members, and my primary care provider/team.    Use your imagination  Be creative.  We all have a creative side; it doesn t matter if it s oil painting, sand castles, or mud pies! This will also kick up the endorphins.    Witness Beauty  (AKA stop and smell the roses) Take a look outside, even in mid-winter. Notice colors, textures. Watch the squirrels and birds.     Service to others  Be of service to others.  There is always someone else in need.  By helping others we can  get out of ourselves  and remember the really important things.  This also provides opportunities for practicing all the other parts of the program.    Humor  Laugh and be silly!  Adjust your TV habits for less news and crime-drama and more comedy.    Control your stress  Try breathing deep, massage therapy, biofeedback, and meditation. Find time to relax each day.     My support system    Clinic Contact:  Phone number:    Contact 1:  Phone number:    Contact 2:  Phone number:    Judaism/:  Phone number:    Therapist:  Phone number:    Local crisis center:    Phone number:    Other community support:  Phone number:

## 2018-03-12 NOTE — PROGRESS NOTES
SUBJECTIVE:   Monika Serrano is a 72 year old female who presents for Preventive Visit.      Are you in the first 12 months of your Medicare coverage?  No    Physical   Annual:     Getting at least 3 servings of Calcium per day::  Yes    Bi-annual eye exam::  Yes    Dental care twice a year::  Yes    Sleep apnea or symptoms of sleep apnea::  Sleep apnea    Diet::  Regular (no restrictions)    Taking medications regularly::  Yes    Medication side effects::  Muscle aches    Ability to successfully perform activities of daily living: no assistance needed  Home Safety:  No safety concerns identified  Hearing Impairment: difficulty following a conversation in a noisy restaurant or crowded room and difficulty understanding soft or whispered speech        Fall risk:  Fallen 2 or more times in the past year?: No  Any fall with injury in the past year?: No    COGNITIVE SCREEN  1) Repeat 3 items (Banana, Sunrise, Chair)    2) Clock draw: NORMAL  3) 3 item recall: Recalls 2 objects   Results: NORMAL clock, 1-2 items recalled: COGNITIVE IMPAIRMENT LESS LIKELY    Mini-CogTM Copyright NOEMÍ Stallworth. Licensed by the author for use in [facility] [employer]; reprinted with permission (maggy@[facility]). All rights reserved.        Reviewed and updated as needed this visit by clinical staff  Tobacco  Allergies  Meds  Problems  Med Hx  Surg Hx  Fam Hx  Soc Hx          Reviewed and updated as needed this visit by Provider  Tobacco  Allergies  Meds  Problems        Social History   Substance Use Topics     Smoking status: Never Smoker     Smokeless tobacco: Never Used     Alcohol use 1.2 oz/week     2 Standard drinks or equivalent per week      Comment: 1 glass of wine every other month        No flowsheet data found.No flowsheet data found.        -------------------------------------    Today's PHQ-2 Score:   PHQ-2 ( 1999 [employer]) 3/12/2018   Q1: Little interest or pleasure in doing things 0   Q2: Feeling down, depressed or  hopeless 0   PHQ-2 Score 0   Q1: Little interest or pleasure in doing things Not at all   Q2: Feeling down, depressed or hopeless Not at all   PHQ-2 Score 0       Do you feel safe in your environment - Yes    Do you have a Health Care Directive?: Yes: Advance Directive has been received and scanned.    Current providers sharing in care for this patient include:   Patient Care Team:  Jade Valdes MD as PCP - General (Family Practice)  Edy Cota MD as MD (Transplant)    The following health maintenance items are reviewed in Epic and correct as of today:  Health Maintenance   Topic Date Due     DEPRESSION ACTION PLAN Q1 YR  08/22/1963     PNEUMOCOCCAL (1 of 2 - PCV13) 08/22/2008     EYE EXAM Q1 YEAR  06/02/2015     ADVANCE DIRECTIVE PLANNING Q5 YRS  01/18/2017     FALL RISK ASSESSMENT  06/17/2017     PHQ-9 Q6 MONTHS  06/27/2017     MAMMO Q1 YR  08/11/2017     LIPID MONITORING Q1 YEAR  02/22/2018     INFLUENZA VACCINE (SYSTEM ASSIGNED)  09/01/2018     TETANUS IMMUNIZATION (SYSTEM ASSIGNED)  04/09/2019     COLON CANCER SCREEN (SYSTEM ASSIGNED)  05/20/2019     DEXA SCAN SCREENING (SYSTEM ASSIGNED)  Completed     Labs reviewed in EPIC  BP Readings from Last 3 Encounters:   03/12/18 138/80   11/30/17 120/72   02/22/17 132/74    Wt Readings from Last 3 Encounters:   03/12/18 126 lb 3.2 oz (57.2 kg)   11/30/17 122 lb 8 oz (55.6 kg)   02/22/17 113 lb (51.3 kg)                  Patient Active Problem List   Diagnosis     Fibromyalgia     Osteopenia     HAM (obstructive sleep apnea)- moderate-severe     Insomnia     Hyperlipidemia with target LDL less than 160     zAdvanced directives, counseling/discussion     Aortic valve stenosis, unspecified etiology     Primary insomnia     Depression with anxiety     S/P aortic valve replacement     Past Surgical History:   Procedure Laterality Date     AORTIC VALVE REPLACEMENT  12/2016       Social History   Substance Use Topics     Smoking status: Never Smoker     Smokeless  "tobacco: Never Used     Alcohol use 1.2 oz/week     2 Standard drinks or equivalent per week      Comment: 1 glass of wine every other month      Family History   Problem Relation Age of Onset     Hypertension Mother      C.A.D. Mother      d. of MI age 75     Lipids Mother      Hypertension Father      C.A.D. Father      age 40, d. age 63 of MI     Lipids Father      C.A.D. Brother      MI age 50     HEART DISEASE Brother      valve replace     C.A.D. Brother      CANCER Brother      Obesity Sister      Suicide Brother      Heart Surgery Brother      Heart Surgery Brother      Heart Surgery Brother          Current Outpatient Prescriptions   Medication Sig Dispense Refill     metoprolol tartrate (LOPRESSOR) 25 MG tablet Take 1/2 tablet twice a day 90 tablet 4     Cholecalciferol (VITAMIN D3 PO) Take 400 Units by mouth as needed        MAGNESIUM OXIDE PO Take 200 mg by mouth as needed        Cyanocobalamin (VITAMIN B 12 PO) Take 1,000 mcg by mouth daily       ORDER FOR DME Auto-cpap 6-10       [DISCONTINUED] metoprolol (LOPRESSOR) 25 MG tablet Take 1 tablet (25 mg) by mouth 2 times daily (Patient taking differently: Take 15 mg by mouth 2 times daily ) 180 tablet 4     Allergies   Allergen Reactions     Atorvastatin Cramps     Sulfa Drugs Rash          Immunization History   Administered Date(s) Administered     HEPA 02/01/2010, 09/14/2010     HepB 02/01/2010, 04/06/2010     TD (ADULT, 7+) 04/09/2009      This 72 year old female is here today for annual female exam. She has done well since her aortic valve replacement 12/2016. She stays very active and eats a healthy diet. She sees a \"Chinese medicine doctor\" who advised her to reduce her metoprolol to 12.5 mg BID since her pulse was a little low. She admits that now she feels her pulse going fast sometimes when she is up to the bathroom at night. She really doesn't want to go back on 25 mg BID and she is very averse to pills. She has chosen to go off lipitor and " "zoloft as well. Feels fine off of them. Didn't like the leg cramps from lipitor. Very resistant to the concept of aging. Doesn't want to think about it or talk about it. Refuses pneumovax. All other review of systems are negative  Personal, family, and social history reviewed with patient and revised.    Review of Systems  CONSTITUTIONAL: NEGATIVE for fever, chills, change in weight  INTEGUMENTARY/SKIN: NEGATIVE for worrisome rashes, moles or lesions  EYES: NEGATIVE for vision changes or irritation  ENT/MOUTH: NEGATIVE for ear, mouth and throat problems  RESP: NEGATIVE for significant cough or SOB  BREAST: NEGATIVE for masses, tenderness or discharge  CV: NEGATIVE for chest pain, palpitations or peripheral edema  GI: NEGATIVE for nausea, abdominal pain, heartburn, or change in bowel habits  : NEGATIVE for frequency, dysuria, or hematuria  MUSCULOSKELETAL: NEGATIVE for significant arthralgias or myalgia  NEURO: NEGATIVE for weakness, dizziness or paresthesias  ENDOCRINE: NEGATIVE for temperature intolerance, skin/hair changes  HEME: NEGATIVE for bleeding problems  PSYCHIATRIC: NEGATIVE for changes in mood or affect    OBJECTIVE:   /80  Pulse 74  Temp 97  F (36.1  C) (Oral)  Resp 16  Ht 5' 1.42\" (1.56 m)  Wt 126 lb 3.2 oz (57.2 kg)  SpO2 97%  BMI 23.52 kg/m2 Estimated body mass index is 23.52 kg/(m^2) as calculated from the following:    Height as of this encounter: 5' 1.42\" (1.56 m).    Weight as of this encounter: 126 lb 3.2 oz (57.2 kg).  Physical Exam  GENERAL APPEARANCE: healthy, alert and no distress  EYES: Eyes grossly normal to inspection, PERRL and conjunctivae and sclerae normal  HENT: ear canals and TM's normal, nose and mouth without ulcers or lesions, oropharynx clear and oral mucous membranes moist  NECK: no adenopathy, no asymmetry, masses, or scars and thyroid normal to palpation  RESP: lungs clear to auscultation - no rales, rhonchi or wheezes  BREAST: normal without masses, tenderness " "or nipple discharge and no palpable axillary masses or adenopathy  CV: regular rate and rhythm, normal S1 S2, no S3 or S4, no murmur, click or rub, no peripheral edema and peripheral pulses strong  ABDOMEN: soft, nontender, no hepatosplenomegaly, no masses and bowel sounds normal  MS: no musculoskeletal defects are noted and gait is age appropriate without ataxia  SKIN: no suspicious lesions or rashes  NEURO: Normal strength and tone, sensory exam grossly normal, mentation intact and speech normal  PSYCH: mentation appears normal and affect normal/bright  Well hydrated  Well nourished  Well groomed  Alert and oriented X 3  Good spirits  Brisk gait with no shortness of breath     ASSESSMENT / PLAN:   1. Encounter for routine adult health examination without abnormal findings  Healthy lady     2. S/P aortic valve replacement  As above   - metoprolol tartrate (LOPRESSOR) 25 MG tablet; Take 1/2 tablet twice a day  Dispense: 90 tablet; Refill: 4  - Basic metabolic panel    3. Hyperlipidemia with target LDL less than 160  As above   - Lipid panel reflex to direct LDL Fasting  - Basic metabolic panel    4. At risk for falling  Low risk     5. Encounter for screening mammogram for breast cancer  due  - *MA Screening Digital Bilateral; Future    End of Life Planning:  Patient currently has an advanced directive: Yes.  Practitioner is supportive of decision.    COUNSELING:  Reviewed preventive health counseling, as reflected in patient instructions       Regular exercise       Healthy diet/nutrition        Estimated body mass index is 23.52 kg/(m^2) as calculated from the following:    Height as of this encounter: 5' 1.42\" (1.56 m).    Weight as of this encounter: 126 lb 3.2 oz (57.2 kg).     reports that she has never smoked. She has never used smokeless tobacco.      Appropriate preventive services were discussed with this patient, including applicable screening as appropriate for cardiovascular disease, diabetes, " osteopenia/osteoporosis, and glaucoma.  As appropriate for age/gender, discussed screening for colorectal cancer, prostate cancer, breast cancer, and cervical cancer. Checklist reviewing preventive services available has been given to the patient.    Reviewed patients plan of care and provided an AVS. The Basic Care Plan (routine screening as documented in Health Maintenance) for Monika meets the Care Plan requirement. This Care Plan has been established and reviewed with the Patient.    Counseling Resources:  ATP IV Guidelines  Pooled Cohorts Equation Calculator  Breast Cancer Risk Calculator  FRAX Risk Assessment  ICSI Preventive Guidelines  Dietary Guidelines for Americans, 2010  USDA's MyPlate  ASA Prophylaxis  Lung CA Screening    SAL RADER MD  [facility]

## 2018-03-13 ASSESSMENT — PATIENT HEALTH QUESTIONNAIRE - PHQ9: SUM OF ALL RESPONSES TO PHQ QUESTIONS 1-9: 3

## 2018-03-13 ASSESSMENT — ANXIETY QUESTIONNAIRES: GAD7 TOTAL SCORE: 2

## 2018-03-17 ENCOUNTER — HEALTH MAINTENANCE LETTER (OUTPATIENT)
Age: 73
End: 2018-03-17

## 2018-04-10 ENCOUNTER — RADIANT APPOINTMENT (OUTPATIENT)
Dept: MAMMOGRAPHY | Facility: CLINIC | Age: 73
End: 2018-04-10
Attending: FAMILY MEDICINE
Payer: COMMERCIAL

## 2018-04-10 DIAGNOSIS — Z12.31 VISIT FOR SCREENING MAMMOGRAM: ICD-10-CM

## 2018-04-10 PROCEDURE — 77067 SCR MAMMO BI INCL CAD: CPT | Mod: TC

## 2018-07-24 NOTE — PROGRESS NOTES
SUBJECTIVE:   Monika Serrano is a 72 year old female who presents to clinic today for the following health issues:      Patient presents with:  Ear Problem: get wax in ear removed   Health Maintenance: Pneumococcal, eye exam, ADP           Problem list and histories reviewed & adjusted, as indicated.  Additional history: as documented    Patient Active Problem List   Diagnosis     Fibromyalgia     Osteopenia     HAM (obstructive sleep apnea)- moderate-severe     Insomnia     Hyperlipidemia with target LDL less than 160     zAdvanced directives, counseling/discussion     Aortic valve stenosis, unspecified etiology     Primary insomnia     Depression with anxiety     S/P aortic valve replacement     Past Surgical History:   Procedure Laterality Date     AORTIC VALVE REPLACEMENT  12/2016       Social History   Substance Use Topics     Smoking status: Never Smoker     Smokeless tobacco: Never Used     Alcohol use 1.2 oz/week     2 Standard drinks or equivalent per week      Comment: 1 glass of wine every other month      Family History   Problem Relation Age of Onset     Hypertension Mother      C.A.D. Mother      d. of MI age 75     Lipids Mother      Hypertension Father      C.A.D. Father      age 40, d. age 63 of MI     Lipids Father      C.A.D. Brother      MI age 50     HEART DISEASE Brother      valve replace     C.A.D. Brother      Cancer Brother      Obesity Sister      Suicide Brother      Heart Surgery Brother      Heart Surgery Brother      Heart Surgery Brother          Current Outpatient Prescriptions   Medication Sig Dispense Refill     MAGNESIUM OXIDE PO Take 200 mg by mouth as needed        metoprolol tartrate (LOPRESSOR) 25 MG tablet TAKE 1 TABLET BY MOUTH 2 TIMES DAILY 180 tablet 1     ORDER FOR DME Auto-cpap 6-10       [DISCONTINUED] metoprolol tartrate (LOPRESSOR) 25 MG tablet Take 1/2 tablet twice a day (Patient not taking: Reported on 7/25/2018) 90 tablet 4     Allergies   Allergen Reactions  "    Atorvastatin Cramps     Sulfa Drugs Rash     BP Readings from Last 3 Encounters:   07/25/18 124/72   03/12/18 138/80   11/30/17 120/72    Wt Readings from Last 3 Encounters:   07/25/18 117 lb 3.2 oz (53.2 kg)   03/12/18 126 lb 3.2 oz (57.2 kg)   11/30/17 122 lb 8 oz (55.6 kg)                  Labs reviewed in EPIC    Reviewed and updated as needed this visit by clinical staff  Tobacco  Allergies  Meds  Problems  Med Hx  Surg Hx  Fam Hx  Soc Hx        Reviewed and updated as needed this visit by Provider  Allergies  Meds  Problems         ROS:  This 72 year old female is here today because her ears are plugged again. Very hard to hear from left ear. No pain. All other review of systems are negative  Personal, family, and social history reviewed with patient and revised.         OBJECTIVE:     /72  Pulse 62  Temp 98.4  F (36.9  C) (Oral)  Resp 20  Ht 5' 1\" (1.549 m)  Wt 117 lb 3.2 oz (53.2 kg)  SpO2 96%  BMI 22.14 kg/m2  Body mass index is 22.14 kg/(m^2).  GENERAL: healthy, alert and no distress  HENT: both ear canals are occluded with wax. ear canals and TM's normal after wax washed out. nose and mouth without ulcers or lesions  NECK: no adenopathy, no asymmetry, masses, or scars and thyroid normal to palpation  MS: no gross musculoskeletal defects noted, no edema    Diagnostic Test Results:  none     ASSESSMENT/PLAN:              1. Bilateral impacted cerumen  As above   - REMOVE IMPACTED CERUMEN    Return to clinic if no improvement     SAL RADER MD  [facility]    "

## 2018-07-25 ENCOUNTER — OFFICE VISIT (OUTPATIENT)
Dept: FAMILY MEDICINE | Facility: CLINIC | Age: 73
End: 2018-07-25
Payer: COMMERCIAL

## 2018-07-25 VITALS
TEMPERATURE: 98.4 F | WEIGHT: 117.2 LBS | DIASTOLIC BLOOD PRESSURE: 72 MMHG | SYSTOLIC BLOOD PRESSURE: 124 MMHG | HEART RATE: 62 BPM | HEIGHT: 61 IN | BODY MASS INDEX: 22.13 KG/M2 | OXYGEN SATURATION: 96 % | RESPIRATION RATE: 20 BRPM

## 2018-07-25 DIAGNOSIS — H61.23 BILATERAL IMPACTED CERUMEN: Primary | ICD-10-CM

## 2018-07-25 PROCEDURE — 99212 OFFICE O/P EST SF 10 MIN: CPT | Performed by: FAMILY MEDICINE

## 2018-07-25 NOTE — MR AVS SNAPSHOT
After Visit Summary   7/25/2018    Monika Serrano    MRN: [id number]           Patient Information     Date Of Birth          1945        Visit Information        Provider Department      7/25/2018 3:00 PM Jade Valdes MD [facility]        Today's Diagnoses     Bilateral impacted cerumen    -  1      Care Instructions    [facility]    If you have any questions regarding to your visit please contact your care team:       Team Purple:   Clinic Hours Telephone Number   Dr. Jade Nuñez   7am-7pm  Monday - Thursday   7am-5pm  Fridays  [phone]  (Appointment scheduling available 24/7)    Questions about your recent visit?   Team Line:  [phone]   Urgent Care - [place] and [facility] - 11am-9pm Monday-Friday Saturday-Sunday- 9am-5pm   [place] - 5pm-9pm Monday-Friday Saturday-Sunday- 9am-5pm  [phone] - [place]  [phone] [facility]       What options do I have for a visit other than an office visit? We offer electronic visits (e-visits) and telephone visits, when medically appropriate.  Please check with your medical insurance to see if these types of visits are covered, as you will be responsible for any charges that are not paid by your insurance.      You can use [employer] (secure electronic communication) to access to your chart, send your primary care provider a message, or make an appointment. Ask a team member how to get started.     For a price quote for your services, please call our Consumer Price Line at [phone] or our Imaging Cost estimation line at [phone] (for imaging tests).              Follow-ups after your visit        Who to contact     If you have questions or need follow up information about today's clinic visit or your schedule please contact [facility] directly at [phone].  Normal or non-critical lab and imaging results will be  "communicated to you by Ahalogyhart, letter or phone within 4 business days after the clinic has received the results. If you do not hear from us within 7 days, please contact the clinic through [employer] or phone. If you have a critical or abnormal lab result, we will notify you by phone as soon as possible.  Submit refill requests through [employer] or call your pharmacy and they will forward the refill request to us. Please allow 3 business days for your refill to be completed.          Additional Information About Your Visit        [employer] Information     [employer] gives you secure access to your electronic health record. If you see a primary care provider, you can also send messages to your care team and make appointments. If you have questions, please call your primary care clinic.  If you do not have a primary care provider, please call [phone] and they will assist you.        Care EveryWhere ID     This is your Care EveryWhere ID. This could be used by other organizations to access your [place] medical records  [id number]        Your Vitals Were     Pulse Temperature Respirations Height Pulse Oximetry BMI (Body Mass Index)    62 98.4  F (36.9  C) (Oral) 20 5' 1\" (1.549 m) 96% 22.14 kg/m2       Blood Pressure from Last 3 Encounters:   07/25/18 124/72   03/12/18 138/80   11/30/17 120/72    Weight from Last 3 Encounters:   07/25/18 117 lb 3.2 oz (53.2 kg)   03/12/18 126 lb 3.2 oz (57.2 kg)   11/30/17 122 lb 8 oz (55.6 kg)              We Performed the Following     REMOVE MARCI RAMOS          Today's Medication Changes          These changes are accurate as of 7/25/18  3:38 PM.  If you have any questions, ask your nurse or doctor.               These medicines have changed or have updated prescriptions.        Dose/Directions    metoprolol tartrate 25 MG tablet   Commonly known as:  LOPRESSOR   This may have changed:  Another medication with the same name was removed. Continue taking this medication, and " follow the directions you see here.   Used for:  S/P aortic valve replacement   Changed by:  Jade Valdes MD        TAKE 1 TABLET BY MOUTH 2 TIMES DAILY   Quantity:  180 tablet   Refills:  1                Primary Care Provider Office Phone # Fax #    Jade Valdes [phone] [address] [facility] [phone]        Equal Access to Services     [facility]: Hadii aad ku hadasho Soomaali, waaxda luqadaha, qaybta kaalmada adeegyada, waxay idiin hayaan adeeg kharash la'aan ah. So [facility] [phone].    ATENCIÓN: Si jaclyn espthomas, tiene a caballero disposición servicios gratuitos de asistencia lingüística. Llame al [phone].    We comply with applicable federal civil rights laws and [place] laws. We do not discriminate on the basis of race, color, national origin, age, disability, sex, sexual orientation, or gender identity.            Thank you!     Thank you for choosing [facility]  for your care. Our goal is always to provide you with excellent care. Hearing back from our patients is one way we can continue to improve our services. Please take a few minutes to complete the written survey that you may receive in the mail after your visit with us. Thank you!             Your Updated Medication List - Protect others around you: Learn how to safely use, store and throw away your medicines at www.disposemymeds.org.          This list is accurate as of 7/25/18  3:38 PM.  Always use your most recent med list.                   Brand Name Dispense Instructions for use Diagnosis    MAGNESIUM OXIDE PO      Take 200 mg by mouth as needed        metoprolol tartrate 25 MG tablet    LOPRESSOR    180 tablet    TAKE 1 TABLET BY MOUTH 2 TIMES DAILY    S/P aortic valve replacement       order for DME      Auto-cpap 6-10    HAM (obstructive sleep apnea)

## 2018-07-25 NOTE — PATIENT INSTRUCTIONS
[facility]    If you have any questions regarding to your visit please contact your care team:       Team Purple:   Clinic Hours Telephone Number   Dr. Jade Nuñez   7am-7pm  Monday - Thursday   7am-5pm  Fridays  [phone]  (Appointment scheduling available 24/7)    Questions about your recent visit?   Team Line:  [phone]   Urgent Care - [place] and [facility] - 11am-9pm Monday-Friday Saturday-Sunday- 9am-5pm   [place] - 5pm-9pm Monday-Friday Saturday-Sunday- 9am-5pm  [phone] - [place]  [phone] [facility]       What options do I have for a visit other than an office visit? We offer electronic visits (e-visits) and telephone visits, when medically appropriate.  Please check with your medical insurance to see if these types of visits are covered, as you will be responsible for any charges that are not paid by your insurance.      You can use [employer] (secure electronic communication) to access to your chart, send your primary care provider a message, or make an appointment. Ask a team member how to get started.     For a price quote for your services, please call our Consumer Price Line at [phone] or our Imaging Cost estimation line at [phone] (for imaging tests).

## 2018-09-30 PROBLEM — I25.10 3-VESSEL CAD: Chronic | Status: ACTIVE | Noted: 2017-01-09

## 2018-09-30 PROBLEM — R45.1 MOTOR RESTLESSNESS: Status: ACTIVE | Noted: 2018-09-30

## 2018-09-30 PROBLEM — I25.10 3-VESSEL CAD: Status: ACTIVE | Noted: 2017-01-09

## 2018-10-01 ENCOUNTER — OFFICE VISIT (OUTPATIENT)
Dept: SLEEP MEDICINE | Facility: CLINIC | Age: 73
End: 2018-10-01
Payer: COMMERCIAL

## 2018-10-01 VITALS
OXYGEN SATURATION: 96 % | HEART RATE: 61 BPM | BODY MASS INDEX: 21.9 KG/M2 | WEIGHT: 119 LBS | SYSTOLIC BLOOD PRESSURE: 185 MMHG | HEIGHT: 62 IN | DIASTOLIC BLOOD PRESSURE: 102 MMHG

## 2018-10-01 DIAGNOSIS — G47.00 INSOMNIA, UNSPECIFIED TYPE: ICD-10-CM

## 2018-10-01 DIAGNOSIS — R45.1 MOTOR RESTLESSNESS: ICD-10-CM

## 2018-10-01 DIAGNOSIS — G47.33 OSA (OBSTRUCTIVE SLEEP APNEA): Primary | Chronic | ICD-10-CM

## 2018-10-01 PROCEDURE — 99213 OFFICE O/P EST LOW 20 MIN: CPT | Performed by: INTERNAL MEDICINE

## 2018-10-01 NOTE — MR AVS SNAPSHOT
After Visit Summary   10/1/2018    Monika Serrano    MRN: [id number]           Patient Information     Date Of Birth          1945        Visit Information        Provider Department      10/1/2018 2:30 PM Xavi Crane MD [facility]        Today's Diagnoses     HAM (obstructive sleep apnea)- moderate-severe    -  1    Motor restlessness        Insomnia, unspecified type          Care Instructions      Your BMI is Body mass index is 22.12 kg/(m^2).  Weight management is a personal decision.  If you are interested in exploring weight loss strategies, the following discussion covers the approaches that may be successful. Body mass index (BMI) is one way to tell whether you are at a healthy weight, overweight, or obese. It measures your weight in relation to your height.  A BMI of 18.5 to 24.9 is in the healthy range. A person with a BMI of 25 to 29.9 is considered overweight, and someone with a BMI of 30 or greater is considered obese. More than two-thirds of American adults are considered overweight or obese.  Being overweight or obese increases the risk for further weight gain. Excess weight may lead to heart disease and diabetes.  Creating and following plans for healthy eating and physical activity may help you improve your health.  Weight control is part of healthy lifestyle and includes exercise, emotional health, and healthy eating habits. Careful eating habits lifelong are the mainstay of weight control. Though there are significant health benefits from weight loss, long-term weight loss with diet alone may be very difficult to achieve- studies show long-term success with dietary management in less than 10% of people. Attaining a healthy weight may be especially difficult to achieve in those with severe obesity. In some cases, medications, devices and surgical management might be considered.  What can you do?  If you are overweight or obese and are interested in methods for  weight loss, you should discuss this with your provider.     Consider reducing daily calorie intake by 500 calories.     Keep a food journal.     Avoiding skipping meals, consider cutting portions instead.    Diet combined with exercise helps maintain muscle while optimizing fat loss. Strength training is particularly important for building and maintaining muscle mass. Exercise helps reduce stress, increase energy, and improves fitness. Increasing exercise without diet control, however, may not burn enough calories to loose weight.       Start walking three days a week 10-20 minutes at a time    Work towards walking thirty minutes five days a week     Eventually, increase the speed of your walking for 1-2 minutes at time    In addition, we recommend that you review healthy lifestyles and methods for weight loss available through the National Institutes of Health patient information sites:  http://win.niddk.nih.gov/publications/index.htm    And look into health and wellness programs that may be available through your health insurance provider, employer, local community center, or verena club.                  Follow-ups after your visit        Follow-up notes from your care team     Return in about 2 years (around 10/1/2020) for Routine Visit.      Who to contact     If you have questions or need follow up information about today's clinic visit or your schedule please contact [facility] directly at [phone].  Normal or non-critical lab and imaging results will be communicated to you by MyChart, letter or phone within 4 business days after the clinic has received the results. If you do not hear from us within 7 days, please contact the clinic through MyChart or phone. If you have a critical or abnormal lab result, we will notify you by phone as soon as possible.  Submit refill requests through kabuku or call your pharmacy and they will forward the refill request to us. Please allow 3 business days  "for your refill to be completed.          Additional Information About Your Visit        MyChart Information     [employer] gives you secure access to your electronic health record. If you see a primary care provider, you can also send messages to your care team and make appointments. If you have questions, please call your primary care clinic.  If you do not have a primary care provider, please call [phone] and they will assist you.        Care EveryWhere ID     This is your Care EveryWhere ID. This could be used by other organizations to access your [place] medical records  [id number]        Your Vitals Were     Pulse Height Pulse Oximetry BMI (Body Mass Index)          61 1.562 m (5' 1.5\") 96% 22.12 kg/m2         Blood Pressure from Last 3 Encounters:   10/01/18 (!) 185/102   07/25/18 124/72   03/12/18 138/80    Weight from Last 3 Encounters:   10/01/18 54 kg (119 lb)   07/25/18 53.2 kg (117 lb 3.2 oz)   03/12/18 57.2 kg (126 lb 3.2 oz)              We Performed the Following     Comprehensive DME        Primary Care Provider Office Phone # Fax #    Jade Valdes [phone] [address] [facility] [phone]        Equal Access to Services     [facility]: Hadii tulio ku hadasho Soomaali, waaxda luqadaha, qaybta kaalmada adeegyada, anastasia ramsay hayflor hutson . So [facility] [phone].    ATENCIÓN: Si habla español, tiene a caballero disposición servicios gratuitos de asistencia lingüística. Llame al [phone].    We comply with applicable federal civil rights laws and [place] laws. We do not discriminate on the basis of race, color, national origin, age, disability, sex, sexual orientation, or gender identity.            Thank you!     Thank you for choosing [facility]  for your care. Our goal is always to provide you with excellent care. Hearing back from our patients is one way we can continue to improve our services. Please take a few " minutes to complete the written survey that you may receive in the mail after your visit with us. Thank you!             Your Updated Medication List - Protect others around you: Learn how to safely use, store and throw away your medicines at www.disposemymeds.org.          This list is accurate as of 10/1/18  3:05 PM.  Always use your most recent med list.                   Brand Name Dispense Instructions for use Diagnosis    MAGNESIUM OXIDE PO      Take 200 mg by mouth as needed        metoprolol tartrate 25 MG tablet    LOPRESSOR    180 tablet    TAKE 1 TABLET BY MOUTH 2 TIMES DAILY    S/P aortic valve replacement       order for DME      Auto-cpap 6-10    HAM (obstructive sleep apnea)

## 2018-10-01 NOTE — PROGRESS NOTES
Obstructive Sleep Apnea - PAP Follow-Up Visit:    Chief Complaint   Patient presents with     CPAP Follow Up         Monika Serrano comes in today for follow-up of their moderate sleep apnea, managed with CPAP.      Initial sleep study on 5/22/13 showed an AHI 30.7, with moderate desaturations down to 84%. RDI 41.2. REM AHI 48, consistent with excessive REM HAM. Periodic Limb Movement Index 0/hour. She has also has had problems with sleep onset/sleep maintenece insomnia and motor restlessness.    Overall, she rates the experience with PAP as 8 (0 poor, 10 great). The mask is comfortable.    The mask is leaking a little.  The mask is leaking 2 nights per week.  She is not snoring with the mask on. She is not having gasp arousals.  She is having significant oral/nasal dryness. The pressure is comfortable.     Her PAP interface is Nasal Pillows.    Bedtime is typically 2200. Usually it takes about various minutes to fall asleep with the mask on. Wake time is typically 0700.  Patient is using PAP therapy 8 hours per night. The patient is usually getting 6 hours of sleep per night.    She does feel rested in the morning.    Total score - [place]: 5 (10/1/2018  2:00 PM)    She awakens 4-5 times a night. She has problems falling back to sleep 1/week.   She is not having as much motor restlessness at this time.     Respironics  Auto-PAP 6 - 10 cmH2O 30 day usage data:    30 days with > 4 hours of use. 0/30 days with no use.   Average use 8 hours 36 minutes per day.   Average time  of night in large leak 0  seconds.    CPAP 90% pressure 7.6 cm.   AHI 2.0 events per hour.      Past medical/surgical history, family history, social history, medications and allergies were reviewed.      Problem List:  Patient Active Problem List    Diagnosis Date Noted     Coronary artery disease involving native coronary artery of native heart with angina pectoris (H) 01/09/2017     Priority: High     Severe aortic stenosis, planned AVR,  pre-operative angiogram: The left main artery has minimal disease. the LAD has mild to moderate disease. The circumflex artery has minimal disease, co-dominant. RCA has moderate disease, co-dominant       S/P aortic valve replacement 12/28/2016     Priority: High     12/2016 for critical aortic stenosis.        Fibromyalgia 05/06/2010     Priority: High     Depression with anxiety 12/28/2016     Priority: Medium     Aortic valve stenosis, unspecified etiology 12/01/2016     Priority: Medium     Hyperlipidemia with target LDL less than 160      Priority: Medium     HAM (obstructive sleep apnea)- moderate-severe      Priority: Medium     Sleep study 2002- GRULLON 25, low O2 91%. MSLT 8.4, no SOREMs  Sleep study 2003- AHI 12.1, RDI 24, Lo2 91%. CPAP 10cm effective.   Sleep study 5/13- AHI 30.7, moderate desaturations to 84%. RDI 41.2.  REM AHI 48.  Periodic Limb Movement Index 0/hour.       Motor restlessness 09/30/2018     Priority: Low     zAdvanced directives, counseling/discussion 12/28/2011     Priority: Low     Advance Directive Problem List Overview:   Name Relationship Phone    Primary Health Care Agent Sukhjinder ROJAS'Neill Spouse [phone]  [phone]         Alternative Health Care Agent Tabitha Watts Daughter [phone]       Discussed advance care planning with patient; information given to patient to review. 12/28/2011   Advance Directive Follow-up Visit  Monika JORY Serrano presents for advanced care planning session accompanied by no one.  Discussed definitions of advanced care planning and advance directive form, and informational handouts given to patient.  Patient voices understanding of advance care planning and advance directive form    Designated healthcare agent is identified. Healthcare agent s name is Sukhjinder Davis.  Designated healthcare agent concerns:  None per patient.  My Hopes and Wishes reviewed and patient and designated healthcare agent are in agreement per patient.  Finalized  "wishes are clear to patient.  Patient will go over the healthcare directive wishes she made with her health care agents at home.  Patient and designated healthcare agent are aware that document may be changed at any time in the future.    Advanced directive form: completed at this visit.  Advanced directive form: verified with notary signature..  Original and two copies of advanced directive form given to patient copy sent to  for scanning into EMR and original given to patient and instructed to give copy to designated H[facility] and non-[place] physician where applicable.  Becky Mobley RN  1/18/2012          --       Insomnia 05/06/2010     Priority: Low     Osteopenia      Priority: Low        BP (!) 185/102  Pulse 61  Ht 1.562 m (5' 1.5\")  Wt 54 kg (119 lb)  SpO2 96%  BMI 22.12 kg/m2    Impression/Plan:    Moderate-severe Sleep apnea.    Tolerating PAP well.    -Continue current treatments.    Insomnia  sleep maintenance difficulties, likely psychophysiologic insomnia   I suggested some sleep restriction with later bedtimes.     Monika Serrano will follow up in about 2 year(s).     Fifteen minutes spent with patient, all of which were spent face-to-face counseling, consulting, coordinating plan of care.          "

## 2018-10-01 NOTE — PATIENT INSTRUCTIONS

## 2018-10-01 NOTE — Clinical Note
Patient to follow up with Primary Care provider regarding elevated blood pressure. BPs were pretty high

## 2018-10-01 NOTE — NURSING NOTE
"Chief Complaint   Patient presents with     CPAP Follow Up       Initial BP (!) 185/102  Pulse 61  Ht 1.562 m (5' 1.5\")  Wt 54 kg (119 lb)  SpO2 96%  BMI 22.12 kg/m2 Estimated body mass index is 22.12 kg/(m^2) as calculated from the following:    Height as of this encounter: 1.562 m (5' 1.5\").    Weight as of this encounter: 54 kg (119 lb).    Medication Reconciliation: complete      "

## 2018-12-05 ENCOUNTER — TRANSFERRED RECORDS (OUTPATIENT)
Dept: HEALTH INFORMATION MANAGEMENT | Facility: CLINIC | Age: 73
End: 2018-12-05

## 2019-01-03 DIAGNOSIS — Z95.2 S/P AORTIC VALVE REPLACEMENT: ICD-10-CM

## 2019-01-03 RX ORDER — METOPROLOL TARTRATE 25 MG/1
25 TABLET, FILM COATED ORAL 2 TIMES DAILY
Qty: 180 TABLET | Refills: 3 | Status: SHIPPED | OUTPATIENT
Start: 2019-01-03 | End: 2019-08-15

## 2019-01-03 RX ORDER — METOPROLOL TARTRATE 25 MG/1
25 TABLET, FILM COATED ORAL 2 TIMES DAILY
Qty: 180 TABLET | Refills: 3 | Status: SHIPPED | OUTPATIENT
Start: 2019-01-03 | End: 2019-01-03

## 2019-01-03 NOTE — TELEPHONE ENCOUNTER
Reason for call:  Medication   If this is a refill request, has the caller requested the refill from the pharmacy already? Yes  Will the patient be using a [facility]? No  Name of the pharmacy and phone number for the current request: [facility] [phone]  [address] [place] [address]    Name of the medication requested: metoprolol tartrate (LOPRESSOR) 25 MG tablet    Other request: Needs refill - [facility] needs a new script sent.    Phone number to reach patient:  Other phone number:  [employer] [phone]*    Best Time:  ASAP    Can we leave a detailed message on this number?  Not Applicable

## 2019-01-23 ENCOUNTER — OFFICE VISIT (OUTPATIENT)
Dept: FAMILY MEDICINE | Facility: CLINIC | Age: 74
End: 2019-01-23
Payer: COMMERCIAL

## 2019-01-23 VITALS
WEIGHT: 119 LBS | SYSTOLIC BLOOD PRESSURE: 122 MMHG | OXYGEN SATURATION: 97 % | RESPIRATION RATE: 16 BRPM | TEMPERATURE: 97.6 F | DIASTOLIC BLOOD PRESSURE: 78 MMHG | BODY MASS INDEX: 21.9 KG/M2 | HEART RATE: 61 BPM | HEIGHT: 62 IN

## 2019-01-23 DIAGNOSIS — I25.119 CORONARY ARTERY DISEASE INVOLVING NATIVE CORONARY ARTERY OF NATIVE HEART WITH ANGINA PECTORIS (H): Chronic | ICD-10-CM

## 2019-01-23 DIAGNOSIS — E78.5 HYPERLIPIDEMIA LDL GOAL <100: ICD-10-CM

## 2019-01-23 DIAGNOSIS — H61.21 IMPACTED CERUMEN OF RIGHT EAR: ICD-10-CM

## 2019-01-23 DIAGNOSIS — L70.9 ACNE, UNSPECIFIED ACNE TYPE: ICD-10-CM

## 2019-01-23 DIAGNOSIS — Z23 NEED FOR PROPHYLACTIC VACCINATION AND INOCULATION AGAINST INFLUENZA: ICD-10-CM

## 2019-01-23 LAB
ALT SERPL W P-5'-P-CCNC: 21 U/L (ref 0–50)
AST SERPL W P-5'-P-CCNC: 15 U/L (ref 0–45)
CHOLEST SERPL-MCNC: 238 MG/DL
GLUCOSE SERPL-MCNC: 94 MG/DL (ref 70–99)
HDLC SERPL-MCNC: 62 MG/DL
LDLC SERPL CALC-MCNC: 151 MG/DL
NONHDLC SERPL-MCNC: 176 MG/DL
TRIGL SERPL-MCNC: 123 MG/DL
TSH SERPL DL<=0.005 MIU/L-ACNC: 1.65 MU/L (ref 0.4–4)

## 2019-01-23 PROCEDURE — 80061 LIPID PANEL: CPT | Performed by: FAMILY MEDICINE

## 2019-01-23 PROCEDURE — 82947 ASSAY GLUCOSE BLOOD QUANT: CPT | Performed by: FAMILY MEDICINE

## 2019-01-23 PROCEDURE — 84443 ASSAY THYROID STIM HORMONE: CPT | Performed by: FAMILY MEDICINE

## 2019-01-23 PROCEDURE — 84450 TRANSFERASE (AST) (SGOT): CPT | Performed by: FAMILY MEDICINE

## 2019-01-23 PROCEDURE — 36415 COLL VENOUS BLD VENIPUNCTURE: CPT | Performed by: FAMILY MEDICINE

## 2019-01-23 PROCEDURE — 99214 OFFICE O/P EST MOD 30 MIN: CPT | Performed by: FAMILY MEDICINE

## 2019-01-23 PROCEDURE — 84460 ALANINE AMINO (ALT) (SGPT): CPT | Performed by: FAMILY MEDICINE

## 2019-01-23 RX ORDER — ROSUVASTATIN CALCIUM 5 MG/1
TABLET, COATED ORAL
Qty: 5 TABLET | Refills: 3 | Status: SHIPPED | OUTPATIENT
Start: 2019-01-23 | End: 2019-01-23

## 2019-01-23 RX ORDER — ROSUVASTATIN CALCIUM 5 MG/1
TABLET, COATED ORAL
Qty: 15 TABLET | Refills: 3 | Status: SHIPPED | OUTPATIENT
Start: 2019-01-23 | End: 2020-12-02

## 2019-01-23 ASSESSMENT — PATIENT HEALTH QUESTIONNAIRE - PHQ9: SUM OF ALL RESPONSES TO PHQ QUESTIONS 1-9: 3

## 2019-01-23 ASSESSMENT — MIFFLIN-ST. JEOR: SCORE: 980.09

## 2019-01-23 NOTE — PATIENT INSTRUCTIONS
[facility]    If you have any questions regarding to your visit please contact your care team:       Team Red:   Clinic Hours Telephone Number   Dr. Orly Vieyra NP 7am-7pm  Monday - Thursday   7am-5pm  Fridays  [phone]  (Appointment scheduling available 24/7)   Urgent Care - [place] and [facility] - 11am-9pm Monday-Friday Saturday-Sunday- 9am-5pm   [place] - 5pm-9pm Monday-Friday Saturday-Sunday- 9am-5pm  [phone] - [place]  [phone] - [place]       What options do I have for a visit other than an office visit? We offer electronic visits (e-visits) and telephone visits, when medically appropriate.  Please check with your medical insurance to see if these types of visits are covered, as you will be responsible for any charges that are not paid by your insurance.      You can use [employer] (secure electronic communication) to access to your chart, send your primary care provider a message, or make an appointment. Ask a team member how to get started.     For a price quote for your services, please call our Consumer Price Line at [phone] or our Imaging Cost estimation line at [phone] (for imaging tests).    FROILAN Clement CMA ([facility])

## 2019-01-24 DIAGNOSIS — E78.5 HYPERLIPIDEMIA WITH TARGET LDL LESS THAN 160: Primary | Chronic | ICD-10-CM

## 2019-04-18 ENCOUNTER — NURSE TRIAGE (OUTPATIENT)
Dept: NURSING | Facility: CLINIC | Age: 74
End: 2019-04-18

## 2019-04-19 ENCOUNTER — OFFICE VISIT (OUTPATIENT)
Dept: FAMILY MEDICINE | Facility: CLINIC | Age: 74
End: 2019-04-19
Payer: COMMERCIAL

## 2019-04-19 VITALS
TEMPERATURE: 96.8 F | DIASTOLIC BLOOD PRESSURE: 82 MMHG | HEART RATE: 66 BPM | OXYGEN SATURATION: 96 % | BODY MASS INDEX: 21.79 KG/M2 | WEIGHT: 115.4 LBS | SYSTOLIC BLOOD PRESSURE: 158 MMHG | HEIGHT: 61 IN

## 2019-04-19 DIAGNOSIS — E78.5 HYPERLIPIDEMIA WITH TARGET LDL LESS THAN 160: Primary | Chronic | ICD-10-CM

## 2019-04-19 DIAGNOSIS — Z95.2 S/P AORTIC VALVE REPLACEMENT: Chronic | ICD-10-CM

## 2019-04-19 DIAGNOSIS — I25.119 CORONARY ARTERY DISEASE INVOLVING NATIVE CORONARY ARTERY OF NATIVE HEART WITH ANGINA PECTORIS (H): Chronic | ICD-10-CM

## 2019-04-19 DIAGNOSIS — N18.30 CKD (CHRONIC KIDNEY DISEASE) STAGE 3, GFR 30-59 ML/MIN (H): ICD-10-CM

## 2019-04-19 DIAGNOSIS — I10 ESSENTIAL HYPERTENSION WITH GOAL BLOOD PRESSURE LESS THAN 140/90: ICD-10-CM

## 2019-04-19 LAB
ALP SERPL-CCNC: 83 U/L (ref 40–150)
ANION GAP SERPL CALCULATED.3IONS-SCNC: 9 MMOL/L (ref 3–14)
BASOPHILS # BLD AUTO: 0.1 10E9/L (ref 0–0.2)
BASOPHILS NFR BLD AUTO: 1.1 %
BUN SERPL-MCNC: 20 MG/DL (ref 7–30)
CALCIUM SERPL-MCNC: 10.1 MG/DL (ref 8.5–10.1)
CHLORIDE SERPL-SCNC: 105 MMOL/L (ref 94–109)
CHOLEST SERPL-MCNC: 231 MG/DL
CO2 SERPL-SCNC: 25 MMOL/L (ref 20–32)
CREAT SERPL-MCNC: 0.97 MG/DL (ref 0.52–1.04)
DIFFERENTIAL METHOD BLD: NORMAL
EOSINOPHIL # BLD AUTO: 0.1 10E9/L (ref 0–0.7)
EOSINOPHIL NFR BLD AUTO: 2.1 %
ERYTHROCYTE [DISTWIDTH] IN BLOOD BY AUTOMATED COUNT: 13.2 % (ref 10–15)
GFR SERPL CREATININE-BSD FRML MDRD: 57 ML/MIN/{1.73_M2}
GLUCOSE SERPL-MCNC: 96 MG/DL (ref 70–99)
HCT VFR BLD AUTO: 44 % (ref 35–47)
HDLC SERPL-MCNC: 68 MG/DL
HGB BLD-MCNC: 14.7 G/DL (ref 11.7–15.7)
LDLC SERPL CALC-MCNC: 147 MG/DL
LYMPHOCYTES # BLD AUTO: 0.8 10E9/L (ref 0.8–5.3)
LYMPHOCYTES NFR BLD AUTO: 15.3 %
MCH RBC QN AUTO: 30.5 PG (ref 26.5–33)
MCHC RBC AUTO-ENTMCNC: 33.4 G/DL (ref 31.5–36.5)
MCV RBC AUTO: 91 FL (ref 78–100)
MONOCYTES # BLD AUTO: 0.6 10E9/L (ref 0–1.3)
MONOCYTES NFR BLD AUTO: 11.9 %
NEUTROPHILS # BLD AUTO: 3.7 10E9/L (ref 1.6–8.3)
NEUTROPHILS NFR BLD AUTO: 69.6 %
NONHDLC SERPL-MCNC: 163 MG/DL
PHOSPHATE SERPL-MCNC: 3.5 MG/DL (ref 2.5–4.5)
PLATELET # BLD AUTO: 240 10E9/L (ref 150–450)
POTASSIUM SERPL-SCNC: 5 MMOL/L (ref 3.4–5.3)
RBC # BLD AUTO: 4.82 10E12/L (ref 3.8–5.2)
SODIUM SERPL-SCNC: 139 MMOL/L (ref 133–144)
TRIGL SERPL-MCNC: 80 MG/DL
WBC # BLD AUTO: 5.3 10E9/L (ref 4–11)

## 2019-04-19 PROCEDURE — 99214 OFFICE O/P EST MOD 30 MIN: CPT | Performed by: INTERNAL MEDICINE

## 2019-04-19 PROCEDURE — 36415 COLL VENOUS BLD VENIPUNCTURE: CPT | Performed by: INTERNAL MEDICINE

## 2019-04-19 PROCEDURE — 82306 VITAMIN D 25 HYDROXY: CPT | Performed by: INTERNAL MEDICINE

## 2019-04-19 PROCEDURE — 80048 BASIC METABOLIC PNL TOTAL CA: CPT | Performed by: INTERNAL MEDICINE

## 2019-04-19 PROCEDURE — 84075 ASSAY ALKALINE PHOSPHATASE: CPT | Performed by: INTERNAL MEDICINE

## 2019-04-19 PROCEDURE — 85025 COMPLETE CBC W/AUTO DIFF WBC: CPT | Performed by: INTERNAL MEDICINE

## 2019-04-19 PROCEDURE — 80061 LIPID PANEL: CPT | Performed by: INTERNAL MEDICINE

## 2019-04-19 PROCEDURE — 84100 ASSAY OF PHOSPHORUS: CPT | Performed by: INTERNAL MEDICINE

## 2019-04-19 RX ORDER — LISINOPRIL 5 MG/1
5 TABLET ORAL DAILY
Qty: 30 TABLET | Refills: 2 | Status: SHIPPED | OUTPATIENT
Start: 2019-04-19 | End: 2019-07-11

## 2019-04-19 ASSESSMENT — MIFFLIN-ST. JEOR: SCORE: 959.32

## 2019-04-19 NOTE — PROGRESS NOTES
SUBJECTIVE:   Monika Serrano is a 73 year old female who presents to clinic today for the following   health issues:    Prior primary care patient with Dr. Jade Valdes and has recently seen Dr. Monica Russell from [facility]    Cardiovascular    She had an aortic valve replacement about 2 years ago and bovine valve in place. She has a protruding end of a sternal wire that remained where the surgical procedure was, this is clearly evident in the upper right chest wall just lateral aspect of the manubrium. Monika has some occasional pain associated with this area. She followed with surgeon and had the option to remove this, but elected not to undergo another surgery.      She reports that when she checks her blood pressure at home she noted some concerning readings. Yesterday afternoon she checked her blood pressure and got a reading of 200/116. She takes metoprolol at this time and was not on a blood pressure medication prior to her open heart surgery. Denies chest pain, shortness of breath, lower extremity swelling. She does have some occasional tingling with the arms. She says that she recently began drinking about 2 cups of coffee/ day and was not a regular coffee drinker before. She also has a history of anxiety.     BP Readings from Last 6 Encounters:   04/19/19 158/82   01/23/19 122/78   10/01/18 (!) 185/102   07/25/18 124/72   03/12/18 138/80   11/30/17 120/72     CKD Stage 3  GFR Estimate   Date Value Ref Range Status   03/12/2018 44 (L) >60 mL/min/1.7m2 Final     Comment:     Non  GFR Calc   12/01/2016 62 >60 mL/min/1.7m2 Final     Comment:     Non  GFR Calc   09/28/2016 52 (L) >60 mL/min/1.7m2 Final     Comment:     Non  GFR Calc   09/14/2016 60 (L) >60 mL/min/1.7m2 Final     Comment:     Non  GFR Calc   06/05/2015 64 >60 mL/min/1.7m2 Final     Comment:     Non  GFR Calc     Hyperlipidemia   She discussed statin  medication previously, but has decided not to begin therapy for this. Concepts of framingham risk index score are reviewed today     The 10-year ASCVD risk score (Julio Cesar QUACH Jr., et al., 2013) is: 23.6%    Values used to calculate the score:      Age: 73 years      Sex: Female      Is Non- : No      Diabetic: No      Tobacco smoker: No      Systolic Blood Pressure: 158 mmHg      Is BP treated: No      HDL Cholesterol: 62 mg/dL      Total Cholesterol: 238 mg/dL    Additional history: as documented    Reviewed  and updated as needed this visit by clinical staff  Tobacco  Allergies  Meds  Med Hx  Surg Hx  Fam Hx  Soc Hx      Reviewed and updated as needed this visit by Provider       Patient Active Problem List   Diagnosis     Fibromyalgia     Osteopenia     HAM (obstructive sleep apnea)- moderate-severe     Insomnia     Hyperlipidemia with target LDL less than 160     zAdvanced directives, counseling/discussion     Depression with anxiety     S/P aortic valve replacement     Coronary artery disease involving native coronary artery of native heart with angina pectoris (H)     Motor restlessness     Past Surgical History:   Procedure Laterality Date     AORTIC VALVE REPLACEMENT  12/2016       Social History     Tobacco Use     Smoking status: Never Smoker     Smokeless tobacco: Never Used   Substance Use Topics     Alcohol use: Yes     Alcohol/week: 1.2 oz     Types: 2 Standard drinks or equivalent per week     Comment: 1 glass of wine every other month      Family History   Problem Relation Age of Onset     Hypertension Mother      C.A.D. Mother         d. of MI age 75     Lipids Mother      Hypertension Father      C.A.D. Father         age 40, d. age 63 of MI     Lipids Father      C.A.D. Brother         MI age 50     Heart Disease Brother         valve replace     C.A.D. Brother      Cancer Brother      Obesity Sister      Suicide Brother      Heart Surgery Brother      Pulmonary Embolism  "Brother      Heart Surgery Brother      Heart Surgery Brother          BP Readings from Last 3 Encounters:   04/19/19 158/82   01/23/19 122/78   10/01/18 (!) 185/102    Wt Readings from Last 3 Encounters:   04/19/19 52.3 kg (115 lb 6.4 oz)   01/23/19 54 kg (119 lb)   10/01/18 54 kg (119 lb)         ROS:  Constitutional, HEENT, cardiovascular, pulmonary, GI, , musculoskeletal, neuro, skin, endocrine and psych systems are negative, except as otherwise noted.    This document serves as a record of the services and decisions personally performed and made by Missael Fair MD. It was created on his behalf by Erik Love, a trained medical scribe. The creation of this document is based on the provider's statements to the medical scribe.  Erik Love 12:33 PM April 19, 2019    OBJECTIVE:     /82   Pulse 66   Temp 96.8  F (36  C) (Oral)   Ht 1.555 m (5' 1.22\")   Wt 52.3 kg (115 lb 6.4 oz)   SpO2 96%   BMI 21.65 kg/m    Body mass index is 21.65 kg/m .  GENERAL: healthy, alert and no distress  EYES: Eyes grossly normal to inspection, PERRL and conjunctivae and sclerae normal  RESP: lungs clear to auscultation - no rales, rhonchi or wheezes  CV: regular rate and rhythm, she has a small systolic ejection murmur , click or rub, no peripheral edema and peripheral pulses strong  SKIN: no suspicious lesions or rashes to visible skin   NEURO: Normal strength and tone, mentation intact and speech normal  PSYCH: mentation appears normal, affect normal/bright    Diagnostic Test Results:  none     ASSESSMENT/PLAN:     (E78.5) Hyperlipidemia with target LDL less than 160  (primary encounter diagnosis)  Comment: I will recalculate her framingham risk index score and make recommendations according to National Cholesterol Education Project guidelines   Plan: Lipid panel reflex to direct LDL Non-fasting            (Z95.2) S/P aortic valve replacement  Comment: noted as a point of historical importance   Plan: CBC with " platelets differential        Clearly doing well    (I25.119) Coronary artery disease involving native coronary artery of native heart with angina pectoris (H)  Comment: she had non-occlusive coronary artery disease   Plan: lisinopril (PRINIVIL/ZESTRIL) 5 MG tablet            (I10) Essential hypertension with goal blood pressure less than 140/90  Comment: fundamentally we see a collision between anxiety and hypertension . All in all I think her current treatment is a tad inadequate and we've added lisinopril   Plan: Basic metabolic panel        Further follow up with medical assistant blood pressure recheck      (N18.3) CKD (chronic kidney disease) stage 3, GFR 30-59 ml/min (H)  Comment: she was entirely unaware of this diagnosis, and we explained the basic pathology of chronic kidney disease and gfr [ glomerular filtration rate ]   Plan: Basic metabolic panel, Vitamin D Deficiency,         Alkaline phosphatase, Phosphorus, lisinopril         (PRINIVIL/ZESTRIL) 5 MG tablet        Follow up as indicated on results       See Patient Instructions    The information in this document, created by the medical scribe for me, accurately reflects the services I personally performed and the decisions made by me. I have reviewed and approved this document for accuracy prior to leaving the patient care area.  April 19, 2019 12:34 PM    Missael Fair MD  [facility]

## 2019-04-19 NOTE — TELEPHONE ENCOUNTER
Ptient calls to say her blood pressure has been high today.  She has checked her blood pressure several times.  BP readings is about 166/111.  Patient reports she takes metoprolol   twice a day.  Patient has not taken evening dose yet.  Patient reports no symptoms during triage but then says she does have a minor headache but says it could be related to her anxiety level and cold symptoms.  Reviewed guideline and care advice with caller to be seen within 24 hours and call back with worsening symptoms.  Caller verbalizes understanding.        Reason for Disposition    BP  >= 180/110    Additional Information    Negative: Difficult to awaken or acting confused  (e.g., disoriented, slurred speech)    Negative: Severe difficulty breathing (e.g., struggling for each breath, speaks in single words)    Negative: [1] Weakness of the face, arm or leg on one side of the body AND [2] new onset    Negative: [1] Numbness (i.e., loss of sensation) of the face, arm or leg on one side of the body AND [2] new onset    Negative: [1] Chest pain lasts > 5 minutes AND [2] history of heart disease  (i.e., heart attack, bypass surgery, angina, angioplasty, CHF)    Negative: [1] Chest pain AND [2] took nitrogylcerin AND [3] pain was not relieved    Negative: Sounds like a life-threatening emergency to the triager    Negative: Symptom is main concern  (e.g., headache, chest pain)    Negative: Low blood pressure is main concern    Negative: [1] BP  >= 160 / 100 AND [2] cardiac or neurologic symptoms    (e.g., chest pain, difficulty breathing, unsteady gait, blurred vision)    Negative: [1] Pregnant AND [2] new hand or face swelling    Negative: [1] Pregnant > 20 weeks AND [2] BP  >= 140/90    Negative: [1] BP  >= 200/120  AND [2] having NO cardiac or neurologic symptoms    Negative: [1] BP  >= 180/110 AND [2] missed most recent dose of blood pressure medication    Protocols used: HIGH BLOOD PRESSURE-ADULT-

## 2019-04-19 NOTE — PATIENT INSTRUCTIONS
BP Readings from Last 6 Encounters:   04/19/19 158/82   01/23/19 122/78   10/01/18 (!) 185/102   07/25/18 124/72   03/12/18 138/80   11/30/17 120/72     Please follow up in 2-4 weeks for a free blood pressure recheck visit with the medical assistant or nurse in order that we can make sure your blood pressure medications are working adequately and that you have been tolerating the medicine.     GFR Estimate   Date Value Ref Range Status   03/12/2018 44 (L) >60 mL/min/1.7m2 Final     Comment:     Non  GFR Calc   12/01/2016 62 >60 mL/min/1.7m2 Final     Comment:     Non  GFR Calc   09/28/2016 52 (L) >60 mL/min/1.7m2 Final     Comment:     Non  GFR Calc     GFR Estimate If Black   Date Value Ref Range Status   03/12/2018 53 (L) >60 mL/min/1.7m2 Final     Comment:      GFR Calc   12/01/2016 75 >60 mL/min/1.7m2 Final     Comment:      GFR Calc   09/28/2016 63 >60 mL/min/1.7m2 Final     Comment:      GFR Calc

## 2019-04-22 ENCOUNTER — TELEPHONE (OUTPATIENT)
Dept: FAMILY MEDICINE | Facility: CLINIC | Age: 74
End: 2019-04-22

## 2019-04-22 LAB — DEPRECATED CALCIDIOL+CALCIFEROL SERPL-MC: 26 UG/L (ref 20–75)

## 2019-04-22 NOTE — TELEPHONE ENCOUNTER
Reason for Call:  Other appointment    Detailed comments:  Patient calling. She is to come in today to have her blood pressure taken and see dr Fair. Please advise when.     Pt declined a ancillary appt.     Phone Number Patient can be reached at: Cell number on file:    Telephone Information:   Mobile [phone]       Best Time:  Any     Can we leave a detailed message on this number? YES    Call taken on 4/22/2019 at 9:29 AM by Soheila Bonilla

## 2019-04-22 NOTE — TELEPHONE ENCOUNTER
She does not need a f/u OV until July  Per OV notes- Dr. Fair advised recommends BP check by MA on ancillary schedule 2-4 weeks from last OV which is not due yet until anytime between May 3-17  This does not need to be with a provider    Please explain this to patient and schedule ancillary BP check between May3-17    Conner Haley RN

## 2019-04-22 NOTE — TELEPHONE ENCOUNTER
Called and spoke with patient and gave information below.   Patient verbalized understanding & no further questions.     Aleta Mcfarland RN

## 2019-04-22 NOTE — TELEPHONE ENCOUNTER
Called patient and explained that ancillary BP appointment is needed after 2-4 weeks on the Lisinopril  Ancillary appointment made for 5/9/19    Patient stated that she has had sinus pressure on the right side of her face and eye for months  Her son in law how is an ENT, recommend she try OTC Afrin  However, the label warns that it can cause high BP  She is asking if she can use it, or try something else  She is open to coming in for an appointment if needed since Dr. Fair did not see her for the sinus issues    Ok to leave detailed message on her phone    Conner Haley RN

## 2019-04-22 NOTE — TELEPHONE ENCOUNTER
Called patient and left detailed VM informing her of below message.   Adelaida WASHINGTON CMA ([facility])

## 2019-04-22 NOTE — TELEPHONE ENCOUNTER
Patient returning call. She is taking a nasal spray medication and one of the side effects is high bp, she is wondering if she should take med, hasn't started taking yet. Please advise.

## 2019-04-22 NOTE — TELEPHONE ENCOUNTER
I think it's ok to use the Afrin [ Oxymetazoline hydrochloride ]  Nasal spray     Lets see the follow up blood pressure readings and also hear back about patients symptoms if they aren't helped by the Afrin [ Oxymetazoline hydrochloride ]      We can revisit these issues at our next face to face encounter .     Missael Fair MD

## 2019-04-30 ENCOUNTER — OFFICE VISIT (OUTPATIENT)
Dept: OPHTHALMOLOGY | Facility: CLINIC | Age: 74
End: 2019-04-30
Payer: COMMERCIAL

## 2019-04-30 ENCOUNTER — ALLIED HEALTH/NURSE VISIT (OUTPATIENT)
Dept: FAMILY MEDICINE | Facility: CLINIC | Age: 74
End: 2019-04-30
Payer: COMMERCIAL

## 2019-04-30 VITALS — DIASTOLIC BLOOD PRESSURE: 76 MMHG | SYSTOLIC BLOOD PRESSURE: 144 MMHG

## 2019-04-30 DIAGNOSIS — H25.812 COMBINED FORMS OF AGE-RELATED CATARACT OF LEFT EYE: ICD-10-CM

## 2019-04-30 DIAGNOSIS — H25.811 COMBINED FORMS OF AGE-RELATED CATARACT OF RIGHT EYE: Primary | ICD-10-CM

## 2019-04-30 DIAGNOSIS — H34.8312 STABLE BRANCH RETINAL VEIN OCCLUSION OF RIGHT EYE (H): ICD-10-CM

## 2019-04-30 DIAGNOSIS — H52.4 PRESBYOPIA: ICD-10-CM

## 2019-04-30 DIAGNOSIS — Z01.30 BP CHECK: Primary | ICD-10-CM

## 2019-04-30 PROCEDURE — 92014 COMPRE OPH EXAM EST PT 1/>: CPT | Performed by: OPHTHALMOLOGY

## 2019-04-30 PROCEDURE — 99207 ZZC NO CHARGE NURSE ONLY: CPT | Performed by: INTERNAL MEDICINE

## 2019-04-30 PROCEDURE — 92015 DETERMINE REFRACTIVE STATE: CPT | Performed by: OPHTHALMOLOGY

## 2019-04-30 ASSESSMENT — REFRACTION_MANIFEST
OS_ADD: +2.50
OD_SPHERE: PLANO
OD_CYLINDER: SPHERE
OD_ADD: +2.50
OS_AXIS: 013
OS_SPHERE: -0.25
OS_CYLINDER: +0.25

## 2019-04-30 ASSESSMENT — VISUAL ACUITY
OD_SC: 20/30
OS_SC+: -3
OS_SC: 20/25
OD_SC+: +2
METHOD: SNELLEN - LINEAR

## 2019-04-30 ASSESSMENT — SLIT LAMP EXAM - LIDS
COMMENTS: 1+ DERMATOCHALASIS
COMMENTS: 1+ DERMATOCHALASIS

## 2019-04-30 ASSESSMENT — EXTERNAL EXAM - LEFT EYE: OS_EXAM: 2+ BROW PTOSIS

## 2019-04-30 ASSESSMENT — CONF VISUAL FIELD
OS_NORMAL: 1
OD_NORMAL: 1

## 2019-04-30 ASSESSMENT — CUP TO DISC RATIO
OD_RATIO: 0.1
OS_RATIO: 0.2

## 2019-04-30 ASSESSMENT — TONOMETRY
IOP_METHOD: APPLANATION
OS_IOP_MMHG: 11
OD_IOP_MMHG: 11

## 2019-04-30 ASSESSMENT — EXTERNAL EXAM - RIGHT EYE: OD_EXAM: 2+ BROW PTOSIS

## 2019-04-30 NOTE — PATIENT INSTRUCTIONS
Ok to continue using over the counter readers (+1.25 to +2.50) as needed for close up vision.  Use artificial tears up to 4 times daily both eyes.  (Refresh Tears, Systane Ultra/Balance, or Theratears)  Could try using a gel/ointment at bedtime for additional comfort (Celluvisc, Refresh PM, Genteal Gel, etc)  Possible posterior vitreous detachment (sudden onset large floater and/or flashing lights) both eyes discussed.   Call in December 2019 for an appointment in April 2020 for Complete Exam.    Dr. Wolf [phone]

## 2019-04-30 NOTE — Clinical Note
Blood pressure in pharmacy today 144/76 patient has appt [id number] [place] [phone]

## 2019-04-30 NOTE — PROGRESS NOTES
" Current Eye Medications:  Refresh both eyes every evening, during the night, twice during the day.        Subjective:  Comprehensive Eye Exam.  Patient complains of dryness in each eye, but especially her right eye.  Today, her right eye feels like there's a filminess to her vision.  Yesterday, for short time (less than 2 hrs), she could see a \"honey comb\"-like object in her vision of her left eye.  Otherwise, distance vision is good without correction, and she wears over-the-counter readers as needed.   Last eye visit:  December of 2018 with Dr. Vilchis.  Last eye exam:  January of 2018 with Dr. Vilchis.   No history of eye injuries or surgery.   Mother possibly had glaucoma.      Hx of branch retinal vein occlusion right eye.      Objective:  See Ophthalmology Exam.       Assessment:  Baseline eye exam in patient with mild cataracts right eye > left eye and hx of branch retinal vein occlusion right eye.      ICD-10-CM    1. Combined forms of age-related cataract, mild-mod, of right eye H25.811 EYE EXAM (SIMPLE-NONBILLABLE)   2. Stable branch retinal vein occlusion of right eye H34.8312    3. Combined forms of age-related cataract, mild, of left eye H25.812    4. Presbyopia H52.4 REFRACTIVE STATUS        Plan:  Ok to continue using over the counter readers (+1.25 to +2.50) as needed for close up vision.  Use artificial tears up to 4 times daily both eyes.  (Refresh Tears, Systane Ultra/Balance, or Theratears)  Could try using a gel/ointment at bedtime for additional comfort (Celluvisc, Refresh PM, Genteal Gel, etc)  Possible posterior vitreous detachment (sudden onset large floater and/or flashing lights) both eyes discussed.   Call in December 2019 for an appointment in April 2020 for Complete Exam.    Dr. Wolf [phone]         "

## 2019-04-30 NOTE — PROGRESS NOTES
Monika Serrano was evaluated at [facility] on April 30, 2019 at which time her blood pressure was:    BP Readings from Last 3 Encounters:   04/30/19 144/76   04/19/19 158/82   01/23/19 122/78     Pulse Readings from Last 3 Encounters:   04/19/19 66   01/23/19 61   10/01/18 61       Reviewed lifestyle modifications for blood pressure control and reduction: including making healthy food choices, managing weight, getting regular exercise, smoking cessation, reducing alcohol consumption, monitoring blood pressure regularly.     Symptoms: None    BP Goal:< 140/90 mmHg    BP Assessment:  BP too high    Potential Reasons for BP too high: NA - Not applicable    BP Follow-Up Plan: Recheck BP in 30 days at pharmacy    Recommendation to Provider: follow up at appt on 5-10-19  Note completed by: Kiki Rudolph Rph  [facility]  [address] [place] [address] [place] [id number]  [phone]

## 2019-04-30 NOTE — LETTER
"    4/30/2019         RE: Monika Serrano  [address] [place] [address]        Dear Colleague,    [facility] Please see a copy of my visit note below.     Current Eye Medications:  Refresh both eyes every evening, during the night, twice during the day.        Subjective:  Comprehensive Eye Exam.  Patient complains of dryness in each eye, but especially her right eye.  Today, her right eye feels like there's a filminess to her vision.  Yesterday, for short time (less than 2 hrs), she could see a \"honey comb\"-like object in her vision of her left eye.  Otherwise, distance vision is good without correction, and she wears over-the-counter readers as needed.   Last eye visit:  December of 2018 with Dr. Vilchis.  Last eye exam:  January of 2018 with Dr. Vilchis.   No history of eye injuries or surgery.   Mother possibly had glaucoma.      Hx of branch retinal vein occlusion right eye.      Objective:  See Ophthalmology Exam.       Assessment:  Baseline eye exam in patient with mild cataracts right eye > left eye and hx of branch retinal vein occlusion right eye.      ICD-10-CM    1. Combined forms of age-related cataract, mild-mod, of right eye H25.811 EYE EXAM (SIMPLE-NONBILLABLE)   2. Stable branch retinal vein occlusion of right eye H34.8312    3. Combined forms of age-related cataract, mild, of left eye H25.812    4. Presbyopia H52.4 REFRACTIVE STATUS        Plan:  Ok to continue using over the counter readers (+1.25 to +2.50) as needed for close up vision.  Use artificial tears up to 4 times daily both eyes.  (Refresh Tears, Systane Ultra/Balance, or Theratears)  Could try using a gel/ointment at bedtime for additional comfort (Celluvisc, Refresh PM, Genteal Gel, etc)  Possible posterior vitreous detachment (sudden onset large floater and/or flashing lights) both eyes discussed.   Call in December 2019 for an appointment in " April 2020 for Complete Exam.    Dr. Wolf [phone]           Again, thank you for allowing me to participate in the care of your patient.        Sincerely,        Leo Wolf MD

## 2019-05-06 ENCOUNTER — ALLIED HEALTH/NURSE VISIT (OUTPATIENT)
Dept: NURSING | Facility: CLINIC | Age: 74
End: 2019-05-06
Payer: COMMERCIAL

## 2019-05-06 VITALS — DIASTOLIC BLOOD PRESSURE: 84 MMHG | HEART RATE: 63 BPM | OXYGEN SATURATION: 97 % | SYSTOLIC BLOOD PRESSURE: 136 MMHG

## 2019-05-06 DIAGNOSIS — Z01.30 BP CHECK: Primary | ICD-10-CM

## 2019-05-06 NOTE — NURSING NOTE
Monika Serrano is a 73 year old patient who comes in today for a Blood Pressure check.  Initial BP:  /84   Pulse 63   SpO2 97%      63  Disposition: results routed to provider  Adelaida WASHINGTON CMA ([facility])

## 2019-05-07 ENCOUNTER — TRANSFERRED RECORDS (OUTPATIENT)
Dept: HEALTH INFORMATION MANAGEMENT | Facility: CLINIC | Age: 74
End: 2019-05-07

## 2019-05-11 DIAGNOSIS — I25.119 CORONARY ARTERY DISEASE INVOLVING NATIVE CORONARY ARTERY OF NATIVE HEART WITH ANGINA PECTORIS (H): Chronic | ICD-10-CM

## 2019-05-11 DIAGNOSIS — N18.30 CKD (CHRONIC KIDNEY DISEASE) STAGE 3, GFR 30-59 ML/MIN (H): ICD-10-CM

## 2019-05-11 DIAGNOSIS — I10 ESSENTIAL HYPERTENSION WITH GOAL BLOOD PRESSURE LESS THAN 140/90: ICD-10-CM

## 2019-05-13 NOTE — TELEPHONE ENCOUNTER
Pharmacy closed. Will call after 9    lisinopril (PRINIVIL/ZESTRIL) 5 MG tablet 30 tablet 2 4/19/2019  --   Sig - Route: Take 1 tablet (5 mg) by mouth daily - Oral   Sent to pharmacy as: lisinopril (PRINIVIL/ZESTRIL) 5 MG tablet   Class: E-Prescribe   Order: [id number]   E-Prescribing Status: Receipt confirmed by pharmacy (4/19/2019 12:59 PM CDT)     Earlene No CMA on 5/13/2019 at 8:42 AM

## 2019-05-14 RX ORDER — LISINOPRIL 5 MG/1
TABLET ORAL
Qty: 30 TABLET | Refills: 0 | OUTPATIENT
Start: 2019-05-14

## 2019-05-21 ENCOUNTER — TRANSFERRED RECORDS (OUTPATIENT)
Dept: HEALTH INFORMATION MANAGEMENT | Facility: CLINIC | Age: 74
End: 2019-05-21

## 2019-06-08 DIAGNOSIS — I10 ESSENTIAL HYPERTENSION WITH GOAL BLOOD PRESSURE LESS THAN 140/90: ICD-10-CM

## 2019-06-08 DIAGNOSIS — N18.30 CKD (CHRONIC KIDNEY DISEASE) STAGE 3, GFR 30-59 ML/MIN (H): ICD-10-CM

## 2019-06-08 DIAGNOSIS — I25.119 CORONARY ARTERY DISEASE INVOLVING NATIVE CORONARY ARTERY OF NATIVE HEART WITH ANGINA PECTORIS (H): Chronic | ICD-10-CM

## 2019-06-10 RX ORDER — LISINOPRIL 5 MG/1
TABLET ORAL
Qty: 30 TABLET | Refills: 1
Start: 2019-06-10

## 2019-06-10 NOTE — TELEPHONE ENCOUNTER
1st request duplicate.    lisinopril (PRINIVIL/ZESTRIL) 5 MG tablet 30 tablet 2 4/19/2019  --   Sig - Route: Take 1 tablet (5 mg) by mouth daily - Oral   Sent to pharmacy as: lisinopril (PRINIVIL/ZESTRIL) 5 MG tablet   Class: E-Prescribe   Order: [id number]   E-Prescribing Status: Receipt confirmed by pharmacy (4/19/2019 12:59 PM CDT)     Adelaida WASHINGTON CMA ([facility])

## 2019-07-11 DIAGNOSIS — I25.119 CORONARY ARTERY DISEASE INVOLVING NATIVE CORONARY ARTERY OF NATIVE HEART WITH ANGINA PECTORIS (H): Chronic | ICD-10-CM

## 2019-07-11 DIAGNOSIS — I10 ESSENTIAL HYPERTENSION WITH GOAL BLOOD PRESSURE LESS THAN 140/90: ICD-10-CM

## 2019-07-11 DIAGNOSIS — N18.30 CKD (CHRONIC KIDNEY DISEASE) STAGE 3, GFR 30-59 ML/MIN (H): ICD-10-CM

## 2019-07-12 RX ORDER — LISINOPRIL 5 MG/1
TABLET ORAL
Qty: 30 TABLET | Refills: 0 | Status: SHIPPED | OUTPATIENT
Start: 2019-07-12 | End: 2019-08-04

## 2019-07-13 NOTE — TELEPHONE ENCOUNTER
Medication is being filled for 1 time refill only due to:  Patient needs to be seen because pt due for recheck this month.Please call to schedule.  Annalee Marie RN

## 2019-08-01 NOTE — PROGRESS NOTES
SUBJECTIVE:   Monika Serrano is a 73 year old female who presents to clinic today for the following health issues:      Patient here today for ear wax removal and to recheck acne on face.   She recently saw Dermatologist and was reassured  Pt has wax in her ears  Hyperlipidemia Follow-Up      Rate your low fat/cholesterol diet?: good    Taking statin?  No    Other lipid medications/supplements?:  none    Vascular Disease Follow-up:  Coronary Artery Disease (CAD)      Chest pain or pressure, left side neck or arm pain: No    Shortness of breath/increased sweats/nausea with exertion: No        Worsened or new symptoms since last visit: No    Nitroglycerin use: no    Daily aspirin use: Yes    Problem list and histories reviewed & adjusted, as indicated.  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 Mother         d. of MI age 75     Lipids Mother      Hypertension Father      C.A.CARROLL. Father         age 40, d. age 63 of MI     Lipids Father      C.A.CARROLL. Brother         MI age 50     Heart Disease Brother         valve replace     SHIRA Brother      Cancer Brother       Patient alert and oriented x 4.   Obesity Sister      Suicide Brother      Heart Surgery Brother      Heart Surgery Brother      Heart Surgery Brother          Current Outpatient Medications   Medication Sig Dispense Refill     MAGNESIUM OXIDE PO Take 200 mg by mouth as needed        metoprolol tartrate (LOPRESSOR) 25 MG tablet Take 1 tablet (25 mg) by mouth 2 times daily 180 tablet 3     ORDER FOR DME Auto-cpap 6-10       rosuvastatin (CRESTOR) 5 MG tablet 5 mg  A week 5 tablet 3     Allergies   Allergen Reactions     Atorvastatin Cramps     Sulfa Drugs Rash     Recent Labs   Lab Test 03/12/18  1156 02/22/17  1145 12/01/16  1211  09/14/16  1403 06/17/16  1001  12/28/11  1719   * 125*  --   --   --  135*   < >  --    HDL 67 69  --   --   --  80   < >  --    TRIG 124 55  --   --   --  50   < >  --    ALT  --   --   --   --   --   --   --  16   CR 1.20*  --  0.89   < > 0.92  --    < > 0.88   GFRESTIMATED 44*  --  62   < > 60*  --    < > 64   GFRESTBLACK 53*  --  75   < > 73  --    < > 77   POTASSIUM 4.7  --  4.0   < > 4.0  --    < > 4.1   TSH  --   --   --   --  1.67  --   --  3.08    < > = values in this interval not displayed.      BP Readings from Last 3 Encounters:   01/23/19 122/78   10/01/18 (!) 185/102   07/25/18 124/72    Wt Readings from Last 3 Encounters:   01/23/19 54 kg (119 lb)   10/01/18 54 kg (119 lb)   07/25/18 53.2 kg (117 lb 3.2 oz)                  Labs reviewed in EPIC    Reviewed and updated as needed this visit by clinical staff  Tobacco  Allergies  Meds  Med Hx  Surg Hx  Fam Hx  Soc Hx      Reviewed and updated as needed this visit by Provider         ROS:  CONSTITUTIONAL: NEGATIVE for fever, chills, change in weight  ENT/MOUTH: as above  Rest of the ROS is Negative except see above and Problem list [stable]    RESP: NEGATIVE for significant cough or SOB  CV: NEGATIVE for chest pain, palpitations or peripheral edema    OBJECTIVE:     /78   Pulse 61   Temp 97.6  F (36.4  C) (Oral)   Resp 16   Ht 1.562 m (5'  "1.5\")   Wt 54 kg (119 lb)   SpO2 97%   Breastfeeding? No   BMI 22.12 kg/m    Body mass index is 22.12 kg/m .  GENERAL: healthy, alert and no distress  HENT: normal cephalic/atraumatic, right ear: occluded with wax, left ear: normal: no effusions, no erythema, normal landmarks and nose and mouth without ulcers or lesions  NECK: no adenopathy, no asymmetry, masses, or scars and thyroid normal to palpation  RESP: lungs clear to auscultation - no rales, rhonchi or wheezes  CV: regular rate and rhythm, normal S1 S2, no S3 or S4, no murmur, click or rub, no peripheral edema and peripheral pulses strong  PSYCH: mentation appears normal, affect normal/bright    Diagnostic Test Results:  Ear wash done   Labs pending     ASSESSMENT/PLAN:       1. Acne, unspecified acne type  Pt has seen a dermatologist  Discussed we can lázaro her a cream if she wants  Pt wants to wash it since it is Mild    2. Impacted cerumen of right ear  Right ear wash was done by MA    3. Hyperlipidemia LDL goal <100  Discussed importance of taking statin  Discussed start with ones a week and increase if tolerated  Follow up Lipid 6 weeks  - rosuvastatin (CRESTOR) 5 MG tablet; 5 mg  A week  Dispense: 5 tablet; Refill: 3  - Lipid panel reflex to direct LDL Non-fasting  - Glucose  - ALT  - AST  - TSH with free T4 reflex    4. Coronary artery disease involving native coronary artery of native heart with angina pectoris (H)  Stable     5. Need for prophylactic vaccination and inoculation against influenza  Advised   Pt declined  Pt has HAM and uses CPAP-wondering if her rash is due to this  Monica Russell MD  [facility]  "

## 2019-08-04 DIAGNOSIS — I25.119 CORONARY ARTERY DISEASE INVOLVING NATIVE CORONARY ARTERY OF NATIVE HEART WITH ANGINA PECTORIS (H): Chronic | ICD-10-CM

## 2019-08-04 DIAGNOSIS — N18.30 CKD (CHRONIC KIDNEY DISEASE) STAGE 3, GFR 30-59 ML/MIN (H): ICD-10-CM

## 2019-08-04 DIAGNOSIS — I10 ESSENTIAL HYPERTENSION WITH GOAL BLOOD PRESSURE LESS THAN 140/90: ICD-10-CM

## 2019-08-06 RX ORDER — LISINOPRIL 5 MG/1
TABLET ORAL
Qty: 30 TABLET | Refills: 1 | Status: SHIPPED | OUTPATIENT
Start: 2019-08-06 | End: 2019-08-15

## 2019-08-15 ENCOUNTER — OFFICE VISIT (OUTPATIENT)
Dept: FAMILY MEDICINE | Facility: CLINIC | Age: 74
End: 2019-08-15
Payer: COMMERCIAL

## 2019-08-15 VITALS
SYSTOLIC BLOOD PRESSURE: 138 MMHG | OXYGEN SATURATION: 95 % | RESPIRATION RATE: 18 BRPM | BODY MASS INDEX: 21.05 KG/M2 | HEART RATE: 88 BPM | WEIGHT: 112.2 LBS | DIASTOLIC BLOOD PRESSURE: 84 MMHG | TEMPERATURE: 97 F

## 2019-08-15 DIAGNOSIS — I10 ESSENTIAL HYPERTENSION WITH GOAL BLOOD PRESSURE LESS THAN 140/90: Primary | ICD-10-CM

## 2019-08-15 DIAGNOSIS — G47.33 OSA (OBSTRUCTIVE SLEEP APNEA): ICD-10-CM

## 2019-08-15 DIAGNOSIS — Z87.898 HX OF EPISTAXIS: ICD-10-CM

## 2019-08-15 DIAGNOSIS — J32.8 OTHER CHRONIC SINUSITIS: ICD-10-CM

## 2019-08-15 DIAGNOSIS — Z12.11 SPECIAL SCREENING FOR MALIGNANT NEOPLASMS, COLON: ICD-10-CM

## 2019-08-15 DIAGNOSIS — Z12.31 ENCOUNTER FOR SCREENING MAMMOGRAM FOR BREAST CANCER: ICD-10-CM

## 2019-08-15 DIAGNOSIS — Z23 NEED FOR DIPHTHERIA-TETANUS-PERTUSSIS (TDAP) VACCINE: ICD-10-CM

## 2019-08-15 DIAGNOSIS — Z95.2 S/P AORTIC VALVE REPLACEMENT: ICD-10-CM

## 2019-08-15 DIAGNOSIS — E78.5 HYPERLIPIDEMIA LDL GOAL <100: ICD-10-CM

## 2019-08-15 DIAGNOSIS — N18.30 CKD (CHRONIC KIDNEY DISEASE) STAGE 3, GFR 30-59 ML/MIN (H): ICD-10-CM

## 2019-08-15 DIAGNOSIS — I25.119 CORONARY ARTERY DISEASE INVOLVING NATIVE CORONARY ARTERY OF NATIVE HEART WITH ANGINA PECTORIS (H): Chronic | ICD-10-CM

## 2019-08-15 PROCEDURE — 90715 TDAP VACCINE 7 YRS/> IM: CPT | Performed by: INTERNAL MEDICINE

## 2019-08-15 PROCEDURE — 90471 IMMUNIZATION ADMIN: CPT | Performed by: INTERNAL MEDICINE

## 2019-08-15 PROCEDURE — 99214 OFFICE O/P EST MOD 30 MIN: CPT | Mod: 25 | Performed by: INTERNAL MEDICINE

## 2019-08-15 RX ORDER — LISINOPRIL 5 MG/1
5 TABLET ORAL DAILY
Qty: 90 TABLET | Refills: 3 | Status: SHIPPED | OUTPATIENT
Start: 2019-08-15 | End: 2020-05-21

## 2019-08-15 RX ORDER — METOPROLOL TARTRATE 25 MG/1
25 TABLET, FILM COATED ORAL 2 TIMES DAILY
Qty: 180 TABLET | Refills: 3 | Status: SHIPPED | OUTPATIENT
Start: 2019-08-15 | End: 2020-05-21

## 2019-08-15 NOTE — PROGRESS NOTES
Last appointment with me was 4-19-19    Preventative healthcare maintenance goals including      Health Maintenance   Topic Date Due     ZOSTER IMMUNIZATION (1 of 2) 08/22/1993     PNEUMOCOCCAL IMMUNIZATION 65+ LOW/MEDIUM RISK (1 of 2 - PCV13) 08/22/2008     ADVANCE CARE PLANNING  01/18/2017     MEDICARE ANNUAL WELLNESS VISIT  03/12/2019     FALL RISK ASSESSMENT  03/12/2019     DTAP/TDAP/TD IMMUNIZATION (2 - Td) 04/09/2019     MAMMO SCREENING  04/10/2019     COLONOSCOPY  05/20/2019     PHQ-9  07/23/2019     INFLUENZA VACCINE (1) 09/01/2019     LIPID  04/19/2020     EYE EXAM  04/30/2020     DEXA  Completed     DEPRESSION ACTION PLAN  Completed     IPV IMMUNIZATION  Aged Out     MENINGITIS IMMUNIZATION  Aged Out       It's not entirely clear to me if patient wishes to pursue healthcare maintenance issues

## 2019-08-15 NOTE — PROGRESS NOTES
Subjective     Last appointment with me was 4-19-19       Essential hypertension with goal blood pressure less than 140/90  Coronary artery disease involving native coronary artery of native heart with angina pectoris (H)  CKD (chronic kidney disease) stage 3, GFR 30-59 ml/min (H)  S/P aortic valve replacement  Hyperlipidemia LDL goal <100  HAM (obstructive sleep apnea)  Other chronic sinusitis  Hx of epistaxis  Special screening for malignant neoplasms, colon  Encounter for screening mammogram for breast cancer  Need for diphtheria-tetanus-pertussis (Tdap) vaccine      Monika Serrano is a 73 year old female who presents to clinic today for the following health issues:    HPI   Hypertension   BP Readings from Last 6 Encounters:   08/15/19 138/84   05/06/19 136/84   04/30/19 144/76   04/19/19 158/82   01/23/19 122/78   10/01/18 (!) 185/102     Sinus Issues  She had recurrent nose bleeds and followed with an outside clinic just a few months ago (she believes this was in May.) Monika says that she had cautery with the nose by Dr. Ilir Watst with [facility] at the [facility] and signed a release of information for these notes. She continues with some sinus issues and she is doing nasal rinses daily with some relief. She uses benadryl on occasion with benefit. She has a CPAP machine and her continued sinus congestion has made this treatment for Obstructive Sleep Apnea more difficult. She has been getting symptoms of facial pain, but denies a history of chronic sinus disease with recurrent green phlegm, coughing, fevers and chills. She has used some afrin spray for this before, but doesn't recall ever trying Flonase.     Additional Information   She had a mammogram about 1 year ago and she plans to have this soon. Colonoscopy discussed today and last exam was 10 years ago and she is unsure about this. New shingrix vaccine discussed with patient today and she declined. TDAP and pneumonia  vaccines discussed today and she would like to read about these before getting them.     Patient Active Problem List   Diagnosis     Fibromyalgia     Osteopenia     HAM (obstructive sleep apnea)- moderate-severe     Insomnia     Hyperlipidemia with target LDL less than 160     zAdvanced directives, counseling/discussion     Depression with anxiety     S/P aortic valve replacement     Coronary artery disease involving native coronary artery of native heart with angina pectoris (H)     Motor restlessness     Combined forms of age-related cataract, mild, of left eye     Combined forms of age-related cataract, mild-mod, of right eye     Stable branch retinal vein occlusion of right eye     Past Surgical History:   Procedure Laterality Date     AORTIC VALVE REPLACEMENT  12/2016       Social History     Tobacco Use     Smoking status: Never Smoker     Smokeless tobacco: Never Used   Substance Use Topics     Alcohol use: Yes     Alcohol/week: 1.2 oz     Types: 2 Standard drinks or equivalent per week     Comment: 1 glass of wine every other month      Family History   Problem Relation Age of Onset     Hypertension Mother      C.A.D. Mother         d. of MI age 75     Lipids Mother      Hypertension Father      C.A.D. Father         age 40, d. age 63 of MI     Lipids Father      C.A.D. Brother         MI age 50     Heart Disease Brother         valve replace     C.A.D. Brother      Cancer Brother      Obesity Sister      Suicide Brother      Heart Surgery Brother      Pulmonary Embolism Brother      Heart Surgery Brother      Heart Surgery Brother          BP Readings from Last 3 Encounters:   08/15/19 138/84   05/06/19 136/84   04/30/19 144/76    Wt Readings from Last 3 Encounters:   08/15/19 50.9 kg (112 lb 3.2 oz)   04/19/19 52.3 kg (115 lb 6.4 oz)   01/23/19 54 kg (119 lb)         Reviewed and updated as needed this visit by Provider       Review of Systems   ROS COMP: Constitutional, HEENT, cardiovascular, pulmonary,  GI, , musculoskeletal, neuro, skin, endocrine and psych systems are negative, except as otherwise noted.    This document serves as a record of the services and decisions personally performed and made by Missael Fair MD. It was created on his behalf by Erik Love, a trained medical scribe. The creation of this document is based on the provider's statements to the medical scribe.  Erik Love 4:27 PM August 15, 2019      Objective    /84   Pulse 88   Temp 97  F (36.1  C) (Oral)   Resp 18   Wt 50.9 kg (112 lb 3.2 oz)   SpO2 95%   BMI 21.05 kg/m    Body mass index is 21.05 kg/m .  Physical Exam   GENERAL: healthy, alert and no distress  EYES: Eyes grossly normal to inspection, PERRL and conjunctivae and sclerae normal  RESP: lungs clear to auscultation - no rales, rhonchi or wheezes  CV: regular rate and rhythm, normal S1 S2, no S3 or S4, no murmur, click or rub, no peripheral edema and peripheral pulses strong  SKIN: no suspicious lesions or rashes to visible skin   NEURO: Normal strength and tone, mentation intact and speech normal  PSYCH: mentation appears normal, affect normal/bright    Diagnostic Test Results:  Labs reviewed in Epic  No results found for this or any previous visit (from the past 24 hour(s)).        Assessment & Plan     (I10) Essential hypertension with goal blood pressure less than 140/90  (primary encounter diagnosis)  Comment: controlled within acceptable limits   Plan: lisinopril (PRINIVIL/ZESTRIL) 5 MG tablet            (I25.119) Coronary artery disease involving native coronary artery of native heart with angina pectoris (H)  Comment: patient has nonocclusive coronary artery disease with mild to moderate 3 vessel coronary artery disease noted at coronary angiogram but on medical management only according to my understanding   Plan: lisinopril (PRINIVIL/ZESTRIL) 5 MG tablet            (N18.3) CKD (chronic kidney disease) stage 3, GFR 30-59 ml/min (H)  Comment: recheck  labs today   Plan: lisinopril (PRINIVIL/ZESTRIL) 5 MG tablet            (Z95.2) S/P aortic valve replacement  Comment: continue current plan of care  , see care everywhere   Plan: metoprolol tartrate (LOPRESSOR) 25 MG tablet            (E78.5) Hyperlipidemia LDL goal <100  Comment: recheck labs today   Plan:   Lab Results   Component Value Date    CHOL 231 04/19/2019     Lab Results   Component Value Date    HDL 68 04/19/2019     Lab Results   Component Value Date     04/19/2019     Lab Results   Component Value Date    TRIG 80 04/19/2019     Lab Results   Component Value Date    CHOLHDLRATIO 2.9 08/21/2014     Not due for follow up this right now    (G47.33) HAM (obstructive sleep apnea)- moderate-severe  Comment: Patient is having issues with her breathing making use of CPAP machine more difficult  Plan: recommended to continue current plan of care  With cpap machine and mask     (J32.8) Other chronic sinusitis  Comment: Has been using benadryl and nasal rinses with relief  Plan: I recommended patient try Zyrtec or Allegra if she would like a non-drowsy alternative to benedryl    (Z87.898) Hx of epistaxis  Comment: Had cautery for this a few months ago at an outside clinic Dr. Ilir Watts with [facility] at the [facility]  Plan: Receive and review these records.    (Z12.11) Special screening for malignant neoplasms, colon  Comment: last colonoscopy was 10 years ago and normal   Plan: GASTROENTEROLOGY ADULT REF PROCEDURE ONLY            (Z12.31) Encounter for screening mammogram for breast cancer  Comment: last mammogram was 1 year ago  Plan: *MA Screening Digital Bilateral            (Z23) Need for diphtheria-tetanus-pertussis (Tdap) vaccine  Comment: patient would like to do some reading on this prior to receiving the vaccine today  Plan: TDAP, IM (10 - 64 YRS) - Adacel         No follow-ups on file.    The information in this document, created by the medical scribe for me,  accurately reflects the services I personally performed and the decisions made by me. I have reviewed and approved this document for accuracy prior to leaving the patient care area.  August 15, 2019 4:27 PM    Missael Fair MD  [facility]

## 2019-08-15 NOTE — PATIENT INSTRUCTIONS
1) I am unaware of any studies that discuss increased prevalence of Alzheimer's with regular use of benadryl. I can do some research on this, but as far as I know benadryl is a safe medicine. Maybe try Zertec or Allegra which you take less of daily, and these are non-drowsy medications. I did advise against use of sudafed when you are taking blood pressure medications.      2) I think having mammogram screening should be done every year or at least every 2 years.     3) Your last colonoscopy was 10 years ago and I recommended that you have this test again. If everything is normal another 10 years will be recommended.     4) I have postponed the tetanus booster for now and provided some reading on this subject:     Why get a tetanus shot ?    Tetanus, sometimes called lockjaw, is a rare disease caused by bacteria known as Clostridium tetani. A toxin produced by the bacteria affects the function of the nerves and leads to severe muscle spasms in the abdomen, neck, stomach, and extremities. Tetanus can either be localized to one part of the body or generalized, with muscle spasms throughout the body. The disease has been called lockjaw since the muscle spasms in the face and neck can lead to the inability to open the mouth, and this is one of the most common symptoms of tetanus. Tetanus is a serious illness that is fatal in up to 30% of cases.     The bacteria that cause tetanus can be found in soil, manure, or dust. They infect humans by entering the body through cuts or puncture wounds, particularly when the wound area is dirty. Animal bites, burns, and non-sterile injection of drugs can also lead to infection with Clostridium tetani. The first symptoms of tetanus can appear any time from three days to weeks after infection, but the average time until symptom onset is eight days. Tetanus is not contagious, so you cannot acquire the disease from someone who has it.     5) We also discussed the pneumonia vaccines and  postponed them for now.

## 2019-08-20 ENCOUNTER — TRANSFERRED RECORDS (OUTPATIENT)
Dept: HEALTH INFORMATION MANAGEMENT | Facility: CLINIC | Age: 74
End: 2019-08-20

## 2019-09-05 ENCOUNTER — TELEPHONE (OUTPATIENT)
Dept: FAMILY MEDICINE | Facility: CLINIC | Age: 74
End: 2019-09-05

## 2019-09-05 NOTE — TELEPHONE ENCOUNTER
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 Please call back       Phone number to reach patient:  Cell number on file:    Telephone Information:   Mobile [phone]       Best Time:  any    Can we leave a detailed message on this number?  YES

## 2019-09-05 NOTE — TELEPHONE ENCOUNTER
Spoke with patient  She stated that she is in the process of moving and she has noticed increased anxiety.  She has used Zoloft in the past and is wondering if she can go on something to help with her anxiety  Offered to send this to covering provider but patient declined  She wants to be seen in clinic and discuss further with a provider  Advised that we have no openings today or tomorrow.  Offered to check for openings next week  Patient then asked writer to call her back as she has people at her home right now and has a lot going on  Advised patient to call the scheduling line back and a patient rep can help her find an opening    If patient calls back, please schedule an appointment a provider to discuss anxiety marquise Haley RN

## 2019-09-05 NOTE — TELEPHONE ENCOUNTER
"Called patient  She verified her  when asked then said, \"can I get a call in a minute\" then hung up    Conner Haley RN    "

## 2019-09-06 ENCOUNTER — OFFICE VISIT (OUTPATIENT)
Dept: FAMILY MEDICINE | Facility: CLINIC | Age: 74
End: 2019-09-06
Payer: COMMERCIAL

## 2019-09-06 VITALS
DIASTOLIC BLOOD PRESSURE: 70 MMHG | BODY MASS INDEX: 19.45 KG/M2 | TEMPERATURE: 97.9 F | WEIGHT: 109.8 LBS | HEIGHT: 63 IN | SYSTOLIC BLOOD PRESSURE: 132 MMHG | HEART RATE: 72 BPM | OXYGEN SATURATION: 99 %

## 2019-09-06 DIAGNOSIS — F51.01 PRIMARY INSOMNIA: Primary | ICD-10-CM

## 2019-09-06 PROCEDURE — 99213 OFFICE O/P EST LOW 20 MIN: CPT | Performed by: INTERNAL MEDICINE

## 2019-09-06 RX ORDER — TRAZODONE HYDROCHLORIDE 50 MG/1
25-100 TABLET, FILM COATED ORAL AT BEDTIME
Qty: 60 TABLET | Refills: 3 | Status: SHIPPED | OUTPATIENT
Start: 2019-09-06 | End: 2020-12-02

## 2019-09-06 ASSESSMENT — MIFFLIN-ST. JEOR: SCORE: 955.79

## 2019-09-06 NOTE — PROGRESS NOTES
Subjective     Monika Serrano is a 74 year old female who presents to clinic today for the following health issues:  Anxiety and depression on zoloft previously  Melatonin and benadryl  Breathing and accupuncture treatments  Busy and moving.        HPI   Insomnia  Onset: x2 ,pmtjs    Description:   Time to fall asleep (sleep latency): pt stated that it varies   Middle of night awakening:  YES  Early morning awakening:  YES    Progression of Symptoms:  worsening    Accompanying Signs & Symptoms:  Daytime sleepiness/napping: no   Excessive snoring/apnea: YES  Restless legs: no   Frequent urination: YES  Chronic pain:  no     History:  Prior Insomnia: YES    Precipitating factors:   New stressful situation: YES- moving  Caffeine intake: no   OTC decongestants: YES  Any new medications: no     Alleviating factors:  Self medicating (alcohol, etc.):  no    Therapies Tried and outcome: OTC Melatonin, Benadryl           Patient Active Problem List   Diagnosis     Fibromyalgia     Osteopenia     HAM (obstructive sleep apnea)- moderate-severe     Insomnia     Hyperlipidemia with target LDL less than 160     zAdvanced directives, counseling/discussion     Depression with anxiety     S/P aortic valve replacement     Coronary artery disease involving native coronary artery of native heart with angina pectoris (H)     Motor restlessness     Combined forms of age-related cataract, mild, of left eye     Combined forms of age-related cataract, mild-mod, of right eye     Stable branch retinal vein occlusion of right eye     CKD (chronic kidney disease) stage 3, GFR 30-59 ml/min (H)     Past Surgical History:   Procedure Laterality Date     AORTIC VALVE REPLACEMENT  12/2016       Social History     Tobacco Use     Smoking status: Never Smoker     Smokeless tobacco: Never Used   Substance Use Topics     Alcohol use: Yes     Alcohol/week: 1.2 oz     Types: 2 Standard drinks or equivalent per week     Comment: 1 glass of wine every  other month      Family History   Problem Relation Age of Onset     Hypertension Mother      BLAKE.A.CARROLL. Mother         d. of MI age 75     Lipids Mother      Hypertension Father      BLAKE.A.D. Father         age 40, d. age 63 of MI     Lipids Father      BLAKE.A.D. Brother         MI age 50     Heart Disease Brother         valve replace     C.ATENISHA. Brother      Cancer Brother      Obesity Sister      Suicide Brother      Heart Surgery Brother      Pulmonary Embolism Brother      Heart Surgery Brother      Heart Surgery Brother          Current Outpatient Medications   Medication Sig Dispense Refill     aspirin (ASA) 81 MG tablet Take 1 tablet (81 mg) by mouth daily       lisinopril (PRINIVIL/ZESTRIL) 5 MG tablet Take 1 tablet (5 mg) by mouth daily 90 tablet 3     MAGNESIUM OXIDE PO Take 200 mg by mouth as needed        metoprolol tartrate (LOPRESSOR) 25 MG tablet Take 1 tablet (25 mg) by mouth 2 times daily 180 tablet 3     ORDER FOR DME Auto-cpap 6-10       rosuvastatin (CRESTOR) 5 MG tablet 5 mg  A week 15 tablet 3     traZODone (DESYREL) 50 MG tablet Take 0.5-2 tablets ( mg) by mouth At Bedtime 60 tablet 3     Allergies   Allergen Reactions     Atorvastatin Cramps     Sulfa Drugs Rash     Recent Labs   Lab Test 04/19/19  1309 01/23/19  1221 03/12/18  1156  09/14/16  1403  12/28/11  1719   * 151* 148*   < >  --    < >  --    HDL 68 62 67   < >  --    < >  --    TRIG 80 123 124   < >  --    < >  --    ALT  --  21  --   --   --   --  16   CR 0.97  --  1.20*   < > 0.92   < > 0.88   GFRESTIMATED 57*  --  44*   < > 60*   < > 64   GFRESTBLACK 66  --  53*   < > 73   < > 77   POTASSIUM 5.0  --  4.7   < > 4.0   < > 4.1   TSH  --  1.65  --   --  1.67  --  3.08    < > = values in this interval not displayed.      Reviewed many options for treatment  Consider remeron, trazodone, or buspar    Continue meditation, accunpuncture  Exercise      Reviewed and updated as needed this visit by Provider         Review of Systems    ROS COMP: Constitutional, HEENT, cardiovascular, pulmonary, gi and gu systems are negative, except as otherwise noted.      Objective    There were no vitals taken for this visit.  There is no height or weight on file to calculate BMI.  Physical Exam   GENERAL: healthy, alert and no distress  EYES: Eyes grossly normal to inspection, PERRL and conjunctivae and sclerae normal  HENT: ear canals and TM's normal, nose and mouth without ulcers or lesions  NECK: no adenopathy, no asymmetry, masses, or scars and thyroid normal to palpation  RESP: lungs clear to auscultation - no rales, rhonchi or wheezes  CV: regular rate and rhythm, normal S1 S2, no S3 or S4, no murmur, click or rub, no peripheral edema and peripheral pulses strong  ABDOMEN: soft, nontender, no hepatosplenomegaly, no masses and bowel sounds normal  MS: no gross musculoskeletal defects noted, no edema  SKIN: no suspicious lesions or rashes    Diagnostic Test Results:  Labs reviewed in Epic  Results for orders placed or performed in visit on 04/19/19   Basic metabolic panel   Result Value Ref Range    Sodium 139 133 - 144 mmol/L    Potassium 5.0 3.4 - 5.3 mmol/L    Chloride 105 94 - 109 mmol/L    Carbon Dioxide 25 20 - 32 mmol/L    Anion Gap 9 3 - 14 mmol/L    Glucose 96 70 - 99 mg/dL    Urea Nitrogen 20 7 - 30 mg/dL    Creatinine 0.97 0.52 - 1.04 mg/dL    GFR Estimate 57 (L) >60 mL/min/[1.73_m2]    GFR Estimate If Black 66 >60 mL/min/[1.73_m2]    Calcium 10.1 8.5 - 10.1 mg/dL   Vitamin D Deficiency   Result Value Ref Range    Vitamin D Deficiency screening 26 20 - 75 ug/L   Alkaline phosphatase   Result Value Ref Range    Alkaline Phosphatase 83 40 - 150 U/L   Phosphorus   Result Value Ref Range    Phosphorus 3.5 2.5 - 4.5 mg/dL   Lipid panel reflex to direct LDL Non-fasting   Result Value Ref Range    Cholesterol 231 (H) <200 mg/dL    Triglycerides 80 <150 mg/dL    HDL Cholesterol 68 >49 mg/dL    LDL Cholesterol Calculated 147 (H) <100 mg/dL    Non HDL  Cholesterol 163 (H) <130 mg/dL   CBC with platelets differential   Result Value Ref Range    WBC 5.3 4.0 - 11.0 10e9/L    RBC Count 4.82 3.8 - 5.2 10e12/L    Hemoglobin 14.7 11.7 - 15.7 g/dL    Hematocrit 44.0 35.0 - 47.0 %    MCV 91 78 - 100 fl    MCH 30.5 26.5 - 33.0 pg    MCHC 33.4 31.5 - 36.5 g/dL    RDW 13.2 10.0 - 15.0 %    Platelet Count 240 150 - 450 10e9/L    % Neutrophils 69.6 %    % Lymphocytes 15.3 %    % Monocytes 11.9 %    % Eosinophils 2.1 %    % Basophils 1.1 %    Absolute Neutrophil 3.7 1.6 - 8.3 10e9/L    Absolute Lymphocytes 0.8 0.8 - 5.3 10e9/L    Absolute Monocytes 0.6 0.0 - 1.3 10e9/L    Absolute Eosinophils 0.1 0.0 - 0.7 10e9/L    Absolute Basophils 0.1 0.0 - 0.2 10e9/L    Diff Method Automated Method            Assessment & Plan       ICD-10-CM    1. Primary insomnia F51.01 traZODone (DESYREL) 50 MG tablet          Regular exercise    No follow-ups on file.    Fritz Genao MD  [facility]

## 2019-09-17 ENCOUNTER — OFFICE VISIT (OUTPATIENT)
Dept: FAMILY MEDICINE | Facility: CLINIC | Age: 74
End: 2019-09-17
Payer: COMMERCIAL

## 2019-09-17 VITALS
TEMPERATURE: 98.6 F | SYSTOLIC BLOOD PRESSURE: 146 MMHG | OXYGEN SATURATION: 99 % | WEIGHT: 107 LBS | BODY MASS INDEX: 19.01 KG/M2 | DIASTOLIC BLOOD PRESSURE: 88 MMHG | HEART RATE: 63 BPM

## 2019-09-17 DIAGNOSIS — J31.0 CHRONIC RHINITIS: ICD-10-CM

## 2019-09-17 DIAGNOSIS — J32.9 CHRONIC SINUSITIS, UNSPECIFIED LOCATION: ICD-10-CM

## 2019-09-17 DIAGNOSIS — I10 HYPERTENSION GOAL BP (BLOOD PRESSURE) < 140/90: ICD-10-CM

## 2019-09-17 DIAGNOSIS — H61.23 EXCESSIVE CERUMEN IN BOTH EAR CANALS: Primary | ICD-10-CM

## 2019-09-17 DIAGNOSIS — G47.00 INSOMNIA, UNSPECIFIED TYPE: ICD-10-CM

## 2019-09-17 PROCEDURE — 69210 REMOVE IMPACTED EAR WAX UNI: CPT | Performed by: FAMILY MEDICINE

## 2019-09-17 PROCEDURE — 99214 OFFICE O/P EST MOD 30 MIN: CPT | Mod: 25 | Performed by: FAMILY MEDICINE

## 2019-09-17 RX ORDER — FLUTICASONE PROPIONATE 50 MCG
1-2 SPRAY, SUSPENSION (ML) NASAL DAILY
Qty: 16 G | Refills: 3 | Status: SHIPPED | OUTPATIENT
Start: 2019-09-17 | End: 2020-10-27

## 2019-09-17 RX ORDER — DIPHENHYDRAMINE HCL 25 MG
25 CAPSULE ORAL
Qty: 30 CAPSULE | Refills: 2 | COMMUNITY
Start: 2019-09-17 | End: 2021-08-09

## 2019-09-17 ASSESSMENT — ANXIETY QUESTIONNAIRES
3. WORRYING TOO MUCH ABOUT DIFFERENT THINGS: MORE THAN HALF THE DAYS
6. BECOMING EASILY ANNOYED OR IRRITABLE: SEVERAL DAYS
2. NOT BEING ABLE TO STOP OR CONTROL WORRYING: SEVERAL DAYS
GAD7 TOTAL SCORE: 9
7. FEELING AFRAID AS IF SOMETHING AWFUL MIGHT HAPPEN: NOT AT ALL
5. BEING SO RESTLESS THAT IT IS HARD TO SIT STILL: MORE THAN HALF THE DAYS
1. FEELING NERVOUS, ANXIOUS, OR ON EDGE: SEVERAL DAYS
IF YOU CHECKED OFF ANY PROBLEMS ON THIS QUESTIONNAIRE, HOW DIFFICULT HAVE THESE PROBLEMS MADE IT FOR YOU TO DO YOUR WORK, TAKE CARE OF THINGS AT HOME, OR GET ALONG WITH OTHER PEOPLE: SOMEWHAT DIFFICULT

## 2019-09-17 ASSESSMENT — PATIENT HEALTH QUESTIONNAIRE - PHQ9
5. POOR APPETITE OR OVEREATING: MORE THAN HALF THE DAYS
SUM OF ALL RESPONSES TO PHQ QUESTIONS 1-9: 8

## 2019-09-17 NOTE — PROGRESS NOTES
Subjective     Monika Serrano is a 74 year old female who presents to clinic today for the following health issues:    HPI   ENT Symptoms             Symptoms: cc Present Absent Comment   Fever/Chills   x    Fatigue  x     Muscle Aches   x    Eye Irritation  x  Right eye   Sneezing  x  Sometimes   Nasal Jaspal/Drg  x     Sinus Pressure/Pain  x  Sometimes   Loss of smell  x     Dental pain   x    Sore Throat  x  Little bit   Swollen Glands   x    Ear Pain/Fullness  x     Cough  x     Wheeze   x    Chest Pain   x    Shortness of breath   x    Rash   x    Other         Symptom duration:  Couple of months   Symptom severity:  Moderate   Treatments tried:  [place], Tea, Benadryl   Contacts:  None       The patient comes in with a number of concerns and complaints.  She has had ongoing nasal congestion and ear fullness off and on for a number of months.  She saw ENT for this earlier this year and had a CT scan which showed sinusitis.  She was treated with antibiotics, but the antibiotics did not help much.  She prefers more of a homeopathic/naturopathic approach.  She is cautious about antibiotics.  She has not been using any steroid nasal sprays.  She has been having trouble sleeping at night.  She was seen for that a week and a half ago and was prescribed trazodone, but she has not used it because she is concerned about side effects.  She does use Benadryl at times to help with sleep.  She is not taking any other antihistamine daily.  She is doing some sinus rinses.  She is on lisinopril and metoprolol for hypertension and history of CAD.  She is in the process of moving and that has been stressful for her.    Patient Active Problem List   Diagnosis     Fibromyalgia     Osteopenia     HAM (obstructive sleep apnea)- moderate-severe     Insomnia     Hyperlipidemia with target LDL less than 160     zAdvanced directives, counseling/discussion     Depression with anxiety     S/P aortic valve replacement     Coronary artery  disease involving native coronary artery of native heart with angina pectoris (H)     Motor restlessness     Combined forms of age-related cataract, mild, of left eye     Combined forms of age-related cataract, mild-mod, of right eye     Stable branch retinal vein occlusion of right eye     CKD (chronic kidney disease) stage 3, GFR 30-59 ml/min (H)     Hypertension goal BP (blood pressure) < 140/90     Past Surgical History:   Procedure Laterality Date     AORTIC VALVE REPLACEMENT  12/2016       Social History     Tobacco Use     Smoking status: Never Smoker     Smokeless tobacco: Never Used   Substance Use Topics     Alcohol use: Yes     Alcohol/week: 1.2 oz     Types: 2 Standard drinks or equivalent per week     Comment: 1 glass of wine every other month      Family History   Problem Relation Age of Onset     Hypertension Mother      C.A.D. Mother         d. of MI age 75     Lipids Mother      Hypertension Father      C.A.D. Father         age 40, d. age 63 of MI     Lipids Father      C.A.D. Brother         MI age 50     Heart Disease Brother         valve replace     C.A.D. Brother      Cancer Brother      Obesity Sister      Suicide Brother      Heart Surgery Brother      Pulmonary Embolism Brother      Heart Surgery Brother      Heart Surgery Brother          Current Outpatient Medications   Medication Sig Dispense Refill     aspirin (ASA) 81 MG tablet Take 1 tablet (81 mg) by mouth daily       diphenhydrAMINE (BENADRYL) 25 MG capsule Take 1 capsule (25 mg) by mouth nightly as needed for itching or allergies 30 capsule 2            lisinopril (PRINIVIL/ZESTRIL) 5 MG tablet Take 1 tablet (5 mg) by mouth daily 90 tablet 3     MAGNESIUM OXIDE PO Take 200 mg by mouth as needed        metoprolol tartrate (LOPRESSOR) 25 MG tablet Take 1 tablet (25 mg) by mouth 2 times daily 180 tablet 3     ORDER FOR DME Auto-cpap 6-10       rosuvastatin (CRESTOR) 5 MG tablet 5 mg  A week (Patient not taking: Reported on 9/17/2019)  15 tablet 3     traZODone (DESYREL) 50 MG tablet Take 0.5-2 tablets ( mg) by mouth At Bedtime (Patient not taking: Reported on 9/17/2019) 60 tablet 3     Allergies   Allergen Reactions     Atorvastatin Cramps     Sulfa Drugs Rash         Reviewed and updated as needed this visit by Provider         Review of Systems   ROS COMP: Constitutional, HEENT, cardiovascular, pulmonary, gi and gu systems are negative, except as otherwise noted.      Objective    BP (!) 146/88 (BP Location: Right arm, Patient Position: Sitting, Cuff Size: Adult Small)   Pulse 63   Temp 98.6  F (37  C) (Oral)   Wt 48.5 kg (107 lb)   SpO2 99%   BMI 19.01 kg/m    Body mass index is 19.01 kg/m .  Physical Exam   GENERAL: healthy, alert and no distress  HENT: Both of her ear canals have a large amount of cerumen present in her TMs are poorly seen because of that.  Nares are mildly congested without purulence.  Oropharynx appears normal.  NECK: no adenopathy, no asymmetry, masses, or scars and thyroid normal to palpation  RESP: lungs clear to auscultation - no rales, rhonchi or wheezes  PSYCH: mentation appears normal, affect normal/bright.  She is quite pleasant, perhaps mildly anxious.  She scored a 9 on the NANDO 7 and a 10 on the PHQ 9.  She feels like most of this is related to the stress from moving after 51 years, however.      We proceeded to irrigate out both ear canals.  The left ear canal was fairly clear when we were done, but the right ear canal still had some wax present.  I then used an alligator forceps to remove some more earwax.  The TMs were then well seen by the time we were done and both appeared normal without any erythema or obvious fluid.    Diagnostic Test Results:  Office notes from ENT from earlier this year were reviewed including her CT results from May        Assessment & Plan       ICD-10-CM    1. Excessive cerumen in both ear canals H61.23 REMOVE IMPACTED CERUMEN   2. Chronic rhinitis J31.0    3. Chronic  sinusitis, unspecified location J32.9    4. Insomnia, unspecified type G47.00    5. Hypertension goal BP (blood pressure) < 140/90 I10      Her plugged ears were addressed today and they felt better when we were done, and that was the main reason for her visit  She is also had ongoing/chronic rhinosinusitis, however  We discussed options for that and I recommended a steroid nasal spray and she was eventually willing to try Flonase, so that was prescribed  She will stay with Benadryl at night to help with sleep as well as nasal drainage  She is going to hold off on the trazodone for now  Her blood pressure was noted to be elevated today above goal (although was down somewhat by the end of the visit)  Discussed possible increase in lisinopril dose if blood pressure stays up  She will follow-up with her PCP on that    Return for a recheck if symptoms worsen or fail to improve.    Carlos Cordero MD  [facility]

## 2019-09-17 NOTE — NURSING NOTE
BP rechecked 146/88  Patient identified using two patient identifiers.  Ear exam showing wax occlusion completed by provider.  H202/H20 was placed in the both ear(s) via Syringe.  Rosemarie Swain CMA

## 2019-09-18 ASSESSMENT — ANXIETY QUESTIONNAIRES: GAD7 TOTAL SCORE: 9

## 2019-09-26 ENCOUNTER — DOCUMENTATION ONLY (OUTPATIENT)
Dept: SLEEP MEDICINE | Facility: CLINIC | Age: 74
End: 2019-09-26
Payer: COMMERCIAL

## 2019-09-26 DIAGNOSIS — R45.1 MOTOR RESTLESSNESS: ICD-10-CM

## 2019-09-26 DIAGNOSIS — G47.33 OSA (OBSTRUCTIVE SLEEP APNEA): ICD-10-CM

## 2019-10-05 ENCOUNTER — HEALTH MAINTENANCE LETTER (OUTPATIENT)
Age: 74
End: 2019-10-05

## 2019-10-30 DIAGNOSIS — G47.33 OSA (OBSTRUCTIVE SLEEP APNEA): Primary | Chronic | ICD-10-CM

## 2019-10-30 NOTE — PROGRESS NOTES
"Patient came to [facility] for a CPAP trouble shoot and supplies appointment on September 26, 2019.  Patient reported that the humidifier light (waves) was orange instead of blue and wasn't sure if the humidifier was working and water is not going down like it used to.  Called [employer] to discuss and communicated results to patient.  The orange light (instead of blue) means heated tubing is attached (as opposed to standard).  If the light is ever flashing, that means there's not enough power supply, 80 watt is needed to run the machine and humidifier.  The water may not be going down if the machine is set to \"humidification,\" this feature is the same as \"adaptive\" on the Dreamstation.  The machine was not set to humidification.  The humidifier will determine how much water is used based on the ambient air in the room, so if the water is not going down, it is because there is more moisture in the air.  If the plate is heating (tested and it was heating), it is working.  Patient was satisfied with the explanation and her machine is working properly.  Patient also received mask, tubing and filters today.  "

## 2019-12-04 ENCOUNTER — ALLIED HEALTH/NURSE VISIT (OUTPATIENT)
Dept: FAMILY MEDICINE | Facility: CLINIC | Age: 74
End: 2019-12-04
Payer: COMMERCIAL

## 2019-12-04 VITALS — DIASTOLIC BLOOD PRESSURE: 80 MMHG | SYSTOLIC BLOOD PRESSURE: 132 MMHG

## 2019-12-04 DIAGNOSIS — I10 HYPERTENSION GOAL BP (BLOOD PRESSURE) < 140/90: Primary | ICD-10-CM

## 2019-12-04 PROCEDURE — 99207 ZZC NO CHARGE NURSE ONLY: CPT | Performed by: INTERNAL MEDICINE

## 2019-12-04 NOTE — PROGRESS NOTES
Monika Serrano was evaluated at [facility] on December 4, 2019 at which time her blood pressure was:    BP Readings from Last 3 Encounters:   12/04/19 132/80   09/17/19 (!) 146/88   09/06/19 132/70     Pulse Readings from Last 3 Encounters:   09/17/19 63   09/06/19 72   08/15/19 88       Reviewed lifestyle modifications for blood pressure control and reduction: including making healthy food choices, managing weight, getting regular exercise, smoking cessation, reducing alcohol consumption, monitoring blood pressure regularly.     Symptoms: None    BP Goal:< 140/90 mmHg    BP Assessment:  BP at goal    Potential Reasons for BP too high: NA - Not applicable    BP Follow-Up Plan: Recheck BP in 6 months at pharmacy    Recommendation to Provider: Continue with current plan.     Note completed by: Thank you,  Radha Villatoro, PharmD  [facility]  [phone]

## 2020-02-10 ENCOUNTER — HEALTH MAINTENANCE LETTER (OUTPATIENT)
Age: 75
End: 2020-02-10

## 2020-05-19 DIAGNOSIS — I10 ESSENTIAL HYPERTENSION WITH GOAL BLOOD PRESSURE LESS THAN 140/90: ICD-10-CM

## 2020-05-19 DIAGNOSIS — I25.119 CORONARY ARTERY DISEASE INVOLVING NATIVE CORONARY ARTERY OF NATIVE HEART WITH ANGINA PECTORIS (H): Chronic | ICD-10-CM

## 2020-05-19 DIAGNOSIS — Z95.2 S/P AORTIC VALVE REPLACEMENT: ICD-10-CM

## 2020-05-19 DIAGNOSIS — N18.30 CKD (CHRONIC KIDNEY DISEASE) STAGE 3, GFR 30-59 ML/MIN (H): ICD-10-CM

## 2020-05-21 RX ORDER — LISINOPRIL 5 MG/1
TABLET ORAL
Qty: 90 TABLET | Refills: 0 | Status: SHIPPED | OUTPATIENT
Start: 2020-05-21 | End: 2020-07-26

## 2020-05-21 RX ORDER — METOPROLOL TARTRATE 25 MG/1
TABLET, FILM COATED ORAL
Qty: 180 TABLET | Refills: 0 | Status: SHIPPED | OUTPATIENT
Start: 2020-05-21 | End: 2020-07-26

## 2020-06-18 ENCOUNTER — TRANSFERRED RECORDS (OUTPATIENT)
Dept: HEALTH INFORMATION MANAGEMENT | Facility: CLINIC | Age: 75
End: 2020-06-18

## 2020-07-24 DIAGNOSIS — I10 ESSENTIAL HYPERTENSION WITH GOAL BLOOD PRESSURE LESS THAN 140/90: ICD-10-CM

## 2020-07-24 DIAGNOSIS — Z95.2 S/P AORTIC VALVE REPLACEMENT: ICD-10-CM

## 2020-07-24 DIAGNOSIS — N18.30 CKD (CHRONIC KIDNEY DISEASE) STAGE 3, GFR 30-59 ML/MIN (H): ICD-10-CM

## 2020-07-24 DIAGNOSIS — I25.119 CORONARY ARTERY DISEASE INVOLVING NATIVE CORONARY ARTERY OF NATIVE HEART WITH ANGINA PECTORIS (H): Chronic | ICD-10-CM

## 2020-07-26 RX ORDER — LISINOPRIL 5 MG/1
TABLET ORAL
Qty: 90 TABLET | Refills: 0 | Status: SHIPPED | OUTPATIENT
Start: 2020-07-26 | End: 2020-11-04

## 2020-07-26 RX ORDER — METOPROLOL TARTRATE 25 MG/1
TABLET, FILM COATED ORAL
Qty: 180 TABLET | Refills: 0 | Status: SHIPPED | OUTPATIENT
Start: 2020-07-26 | End: 2020-11-04

## 2020-07-26 NOTE — TELEPHONE ENCOUNTER
Medication is being filled for 1 time refill only due to:  Patient needs to be seen because needs annual exam-please CALL to schedule. .   Annalee Marie RN

## 2020-10-22 ENCOUNTER — DOCUMENTATION ONLY (OUTPATIENT)
Dept: SLEEP MEDICINE | Facility: CLINIC | Age: 75
End: 2020-10-22

## 2020-10-22 DIAGNOSIS — R45.1 MOTOR RESTLESSNESS: ICD-10-CM

## 2020-10-22 DIAGNOSIS — G47.33 OSA (OBSTRUCTIVE SLEEP APNEA): ICD-10-CM

## 2020-10-22 DIAGNOSIS — G47.00 INSOMNIA: ICD-10-CM

## 2020-10-22 NOTE — PROGRESS NOTES
PT GOT A MACHINE IN 2013, SHE IS ELIGIBLE FOR A REPLACEMENT MACHINE. DUE TO HER CURRENT INS BEING [employer] I WANTED TO BE AS TRANSPARENT WITH HER AS POSSIBLE. I EXPLAINED HER OPTIONS.    1.) SEE  FOR REPLACEMENT RX/GETTING A NEW MACHINE  2.) GETTING HER A LOANER SENDING HER MACHINE IN FOR REPAIRS   3.) RENTING A CPAP AT 50.00 OOP MONTHLY UNTIL SHE KNOWS HOW SHE WANTS TO MOVE FORWARD BASED ON HER INS CHANGES.     I EXPLAINED TO NANNETTE THAT IF SHE DOES GET A NEW MACHINE [employer] OR ANY MEDICARE BASED PLAN WILL REQUIRE HER TO RENT THE MACHINE FOR 13MO. I LET HER KNOW TOTAL WITH ALL SUPPLIES THIS CAN EXCEED OR BE AROUND 3,000.00. WITH A MONTHLY RENTAL COST . I EXPRESSED THAT MOST MEDICARE BASED PLANS WILL COVER AT 80/20 BUT I CANNOT GUARANTEE ANY INFORMATION/BILLING AMOUNTS AND SHE SHOULD ALWAYS SPEAK DIRECTLY TO HER INS REGARDING COVERAGE AND PRICING. I EXPLAINED THAT IF WE SET UP ON A NEW CPAP THIS MONTH SHE WOULD GET IN NETWORK COVERAGE THROUGH [employer] UNTIL 1/1/21, AFTER THAT WE WOULD BE CONSIDERED OUT OF NETWORK FOR [employer]. DUE TO THIS SHE MAY END UP PAYING MORE, BUT SHE WOULD HAVE TO DISCUSS HER OUT OF NETWORK BENEFITS WITH [employer]. LET HER KNOW IF SHE SWITCHES INS IN 2021 WE MAY BE ABLE TO GO FORWARD WITH A REPLACEMENT THIS YEAR, AND HAVE IT BILL INTO 2021 WITHOUT ANY MAJOR PRICING CHANGES BUT THAT IS DEPENDENT ON WHAT INS SHE CHANGES TO. SHE ASKED FOR MY RECOMMENDATION ON AN INSURANCE PLAN, I LET HER KNOW WE DO WORK WITH MOST PLANS, HOWEVER I ONLY SEND CPAP/BIPAP ORDERS TO BILLING AND DUE TO THIS I DO NOT FEEL COMFORTABLE GIVING HER A RECOMMENDATION, I ADVISED HER TO CALL ANY PLANS SHE MAY BE INTERESTED IN TO GET MORE INFORMATION FROM THEM TO MAKE HER DECISION, AND LET HER KNOW THEY WOULD BE ABLE TO TELL HER IF ARE CONSIDERED AN IN NETWORK VENDOR OR NOT. IF SHE OPTS TO SEND THE MACHINE IN FOR REPAIRS AND DECIDES TO TRANSFER CARE TO ANOTHER DME/STAY WITH [employer] AND TO GET A NEW MACHINE SHE COULD ALSO DENY  THE REPAIRS, GET THE BROKEN CPAP BACK AND RETURN THE LOANER. SHE IS ALSO WELCOME TO REPAIR HER CURRENT MACHINE, AS THAT WOULD LIKELY BE DONE BY 2012 SHE WOULD GET IN NETWORK BENEFITS FOR REPAIR AND LOANER FEE, SHE IS AWARE LOANER IS A ONE TIME FEE .00. FINALLY, I EXPRESSED IF SHE WANTED SOME TIME TO FIGURE THIS OUT, SHE IS WELCOME TO RENT A LOANER CPAP FROM US FOR 50.00 A MONTH OOP. SHE WAS HESITANT TO GET A LOANER IN ANY CAPACITY DUE TO COVID19 CONCERNS, I LET HER KNOW OUR CLEANING PROCESS HAS BEEN APPROVED BY INFECTION CONTROL. SHE IS GOING TO THINK ABOUT HER OPTIONS AND SHE WILL CALL ME BACK ON MY DIRECT LINE ONCE SHE KNOWS HOW SHE WOULD LIKE TO MOVE FORWARD. -DANIEL DOWD- 11:36 A.M     10/22/20: SPOKE WITH NANNETTE SHE IS STILL TRYING TO RESOLVE HER QUESTION ON SWITCHING INS IN 2021 OR SWITCHING TO DME.

## 2020-10-26 VITALS — BODY MASS INDEX: 21.16 KG/M2 | WEIGHT: 115 LBS | HEIGHT: 62 IN

## 2020-10-26 PROBLEM — N18.30 CKD (CHRONIC KIDNEY DISEASE) STAGE 3, GFR 30-59 ML/MIN (H): Chronic | Status: ACTIVE | Noted: 2019-08-15

## 2020-10-26 ASSESSMENT — MIFFLIN-ST. JEOR: SCORE: 951.95

## 2020-10-26 NOTE — PATIENT INSTRUCTIONS
Your BMI is Body mass index is 21.38 kg/m .  Weight management is a personal decision.  If you are interested in exploring weight loss strategies, the following discussion covers the approaches that may be successful. Body mass index (BMI) is one way to tell whether you are at a healthy weight, overweight, or obese. It measures your weight in relation to your height.  A BMI of 18.5 to 24.9 is in the healthy range. A person with a BMI of 25 to 29.9 is considered overweight, and someone with a BMI of 30 or greater is considered obese. More than two-thirds of American adults are considered overweight or obese.  Being overweight or obese increases the risk for further weight gain. Excess weight may lead to heart disease and diabetes.  Creating and following plans for healthy eating and physical activity may help you improve your health.  Weight control is part of healthy lifestyle and includes exercise, emotional health, and healthy eating habits. Careful eating habits lifelong are the mainstay of weight control. Though there are significant health benefits from weight loss, long-term weight loss with diet alone may be very difficult to achieve- studies show long-term success with dietary management in less than 10% of people. Attaining a healthy weight may be especially difficult to achieve in those with severe obesity. In some cases, medications, devices and surgical management might be considered.  What can you do?  If you are overweight or obese and are interested in methods for weight loss, you should discuss this with your provider.     Consider reducing daily calorie intake by 500 calories.     Keep a food journal.     Avoiding skipping meals, consider cutting portions instead.    Diet combined with exercise helps maintain muscle while optimizing fat loss. Strength training is particularly important for building and maintaining muscle mass. Exercise helps reduce stress, increase energy, and improves fitness.  Increasing exercise without diet control, however, may not burn enough calories to loose weight.       Start walking three days a week 10-20 minutes at a time    Work towards walking thirty minutes five days a week     Eventually, increase the speed of your walking for 1-2 minutes at time    In addition, we recommend that you review healthy lifestyles and methods for weight loss available through the National Institutes of Health patient information sites:  http://win.niddk.nih.gov/publications/index.htm    And look into health and wellness programs that may be available through your health insurance provider, employer, local community center, or verena club.

## 2020-10-26 NOTE — PROGRESS NOTES
"Monika Serrano is a 75 year old female who is being evaluated via a billable video visit.      The patient has been notified of following:     \"This video visit will be conducted via a call between you and your physician/provider. We have found that certain health care needs can be provided without the need for an in-person physical exam.  This service lets us provide the care you need with a video conversation.  If a prescription is necessary we can send it directly to your pharmacy.  If lab work is needed we can place an order for that and you can then stop by our lab to have the test done at a later time.    Video visits are billed at different rates depending on your insurance coverage.  Please reach out to your insurance provider with any questions.    If during the course of the call the physician/provider feels a video visit is not appropriate, you will not be charged for this service.\"    Patient has given verbal consent for Video visit? Yes  How would you like to obtain your AVS? MyChart  If you are dropped from the video visit, the video invite should be resent to: Text to cell phone: [phone]  Will anyone else be joining your video visit? No        Video-Visit Details    Type of service:  Video Visit    Video Start Time: 2:34 PM  Video End Time: 3:31 PM    Originating Location (pt. Location): Home    Distant Location (provider location):  [facility]     Platform used for Video Visit: Well      Obstructive Sleep Apnea - PAP Follow-Up Visit:    Chief Complaint   Patient presents with     CPAP Follow Up       DME: [facility]       Monika Serrano for follow-up of their mild sleep apnea, severe in REM or supine sleep, managed with CPAP.     Sleep study 2002- GRULLON 25, low O2 91%. MSLT 8.4, no SOREMs   Sleep study 2003- AHI 12.1, RDI 24, Lo2 91%. CPAP 10cm effective.     She initially presented to [facility] in 2013 with symptoms of sleep " maintenance difficulties and a history of mild obstructive sleep apnea by sleep study 2003. No improvement in insomnia symptoms with CPAP, previously.    - Insomnia, psychophysiologic.   - Motor restlessness.       Sleep study on 5/22/13 (111#) - AHI 11.1, supine AHI 30.7, REM AHI 48, desaturations down to 84%, 1.8 minutes <+ 90%, RDI 41.2. REM AHI 48, consistent with excessive REM HAM. Periodic Limb Movement Index 0/hour.     She elected another trial of CPAP which did seem to help some with sleep maintenance difficulties, though she had comorbid insomnia and pain issues at the time.      CPAP was not working after she put too much water in it one night last week. When she pushed button orange lights came on, but no air came out and it did not display the usual humidifier data. She has not been using her PAP, after 4 nights she started having difficulty sleeping. It is 7 years old.     She is wondering if she needs to continue using it.     Two nights ago it started working and has been using it again.     Overall, she rates the experience with PAP as 'not great'. She feels it aggravated her dry eyes. She was having difficulty falling asleep 'quite often', 2-3 nights a week due to an overactive brain. She would wake up 3-4 times a night and have difficulty falling back to sleep 2-3 times a week due to an over-active brain, but sometimes not.     She does a lot of 'prep' before going to bed. She does not nap.     Bedtime is typically 4048-0231. Wake time is typically 730.     She denies current motor restlessness    Her PAP interface is Nasal Pillows    No ARSLAN or ESS done      CPAP  usage data: Not available           Past medical/surgical history, family history, social history, medications and allergies were reviewed.      Problem List:  Patient Active Problem List    Diagnosis Date Noted     Coronary artery disease involving native coronary artery of native heart with angina pectoris (H) 01/09/2017     Priority:  High     Severe aortic stenosis, planned AVR, pre-operative angiogram: The left main artery has minimal disease. the LAD has mild to moderate disease. The circumflex artery has minimal disease, co-dominant. RCA has moderate disease, co-dominant       S/P aortic valve replacement 12/28/2016     Priority: High     12/2016 for critical aortic stenosis.        Hypertension goal BP (blood pressure) < 140/90 09/17/2019     Priority: Medium     CKD (chronic kidney disease) stage 3, GFR 30-59 ml/min 08/15/2019     Priority: Medium     Anxiety 12/28/2016     Priority: Medium     Hyperlipidemia with target LDL less than 160      Priority: Medium     Fibromyalgia 05/06/2010     Priority: Medium     HAM (obstructive sleep apnea)- milld-moderate      Priority: Medium     Sleep study 2002- GRULLON 25, low O2 91%. MSLT 8.4, no SOREMs  Sleep study 2003- AHI 12.1, RDI 24, Lo2 91%. CPAP 10cm effective.   Sleep study on 5/22/13 (111#) - AHI 11.1, supine AHI 30.7, REM AHI 48, desaturations down to 84%, 1.8 minutes <+ 90%, RDI 41.2. REM AHI 48, consistent with excessive REM HAM. Periodic Limb Movement Index 0/hour.        Combined forms of age-related cataract, mild, of left eye 04/30/2019     Priority: Low     Combined forms of age-related cataract, mild-mod, of right eye 04/30/2019     Priority: Low     Stable branch retinal vein occlusion of right eye 04/30/2019     Priority: Low     Motor restlessness 09/30/2018     Priority: Low     zAdvanced directives, counseling/discussion 12/28/2011     Priority: Low     Advance Directive Problem List Overview:   Name Relationship Phone    Primary Health Care Agent Sukhjinder Davis Spouse [phone]         Alternative Health Care Agent Tabitha Watts Daughter [phone]       Discussed advance care planning with patient; information given to patient to review. 12/28/2011   Advance Directive Follow-up Visit  Monika Serrano presents for advanced care planning session  "accompanied by no one.  Discussed definitions of advanced care planning and advance directive form, and informational handouts given to patient.  Patient voices understanding of advance care planning and advance directive form    Designated healthcare agent is identified. Healthcare agent s name is Sukhjinder Davis.  Designated healthcare agent concerns:  None per patient.  My Hopes and Wishes reviewed and patient and designated healthcare agent are in agreement per patient.  Finalized wishes are clear to patient.  Patient will go over the healthcare directive wishes she made with her health care agents at home.  Patient and designated healthcare agent are aware that document may be changed at any time in the future.    Advanced directive form: completed at this visit.  Advanced directive form: verified with notary signature..  Original and two copies of advanced directive form given to patient copy sent to  for scanning into EMR and original given to patient and instructed to give copy to designated [facility] and non-[facility] physician where applicable.  Becky Mobley RN  1/18/2012       Chronic insomnia 05/06/2010     Priority: Low     Osteopenia      Priority: Low        Ht 1.562 m (5' 1.5\")   Wt 52.2 kg (115 lb)   BMI 21.38 kg/m      Impression/Plan:     Mild Sleep apnea, severe in REM or supine sleep  Tolerating PAP okay, but finds it a nuisance, and she has some difficulties because of her insomnia. Rationale for continued treatment reviewed, though it is soft. Treatment options including no treatment   - Elect to continue CPAP, Rx for new machine given. May use [employer] if [employer] does not cover [facility]    Psychophysiologic insomnia  Insufficient time to address in detail. Briefly encouraged getting out of bed when awake and some mild sleep restriction.     She has [employer] and will need to change providers. But is changing to [employer] after the first of the year      Monika Serrano will follow up in " about 2 month(s).     60 minutes spent with patient

## 2020-10-27 ENCOUNTER — VIRTUAL VISIT (OUTPATIENT)
Dept: SLEEP MEDICINE | Facility: CLINIC | Age: 75
End: 2020-10-27
Payer: COMMERCIAL

## 2020-10-27 ENCOUNTER — TRANSFERRED RECORDS (OUTPATIENT)
Dept: HEALTH INFORMATION MANAGEMENT | Facility: CLINIC | Age: 75
End: 2020-10-27

## 2020-10-27 DIAGNOSIS — R45.1 MOTOR RESTLESSNESS: ICD-10-CM

## 2020-10-27 DIAGNOSIS — G47.33 OSA (OBSTRUCTIVE SLEEP APNEA): Primary | ICD-10-CM

## 2020-10-27 DIAGNOSIS — F51.04 CHRONIC INSOMNIA: ICD-10-CM

## 2020-10-27 PROCEDURE — 99215 OFFICE O/P EST HI 40 MIN: CPT | Mod: 95 | Performed by: INTERNAL MEDICINE

## 2020-11-04 DIAGNOSIS — Z95.2 S/P AORTIC VALVE REPLACEMENT: ICD-10-CM

## 2020-11-04 DIAGNOSIS — I10 ESSENTIAL HYPERTENSION WITH GOAL BLOOD PRESSURE LESS THAN 140/90: ICD-10-CM

## 2020-11-04 DIAGNOSIS — I25.119 CORONARY ARTERY DISEASE INVOLVING NATIVE CORONARY ARTERY OF NATIVE HEART WITH ANGINA PECTORIS (H): Chronic | ICD-10-CM

## 2020-11-04 DIAGNOSIS — N18.31 STAGE 3A CHRONIC KIDNEY DISEASE (H): ICD-10-CM

## 2020-11-04 RX ORDER — LISINOPRIL 5 MG/1
TABLET ORAL
Qty: 90 TABLET | Refills: 0 | Status: SHIPPED | OUTPATIENT
Start: 2020-11-04 | End: 2021-01-29

## 2020-11-04 RX ORDER — METOPROLOL TARTRATE 25 MG/1
TABLET, FILM COATED ORAL
Qty: 180 TABLET | Refills: 0 | Status: SHIPPED | OUTPATIENT
Start: 2020-11-04 | End: 2021-01-29

## 2020-11-04 NOTE — TELEPHONE ENCOUNTER
"Routing refill request to provider for review/approval because:  Failed protocol.  No future appointment scheduled. Please advise. Thank you. Elyssa Krishna R.N.  Requested Prescriptions   Pending Prescriptions Disp Refills    lisinopril (ZESTRIL) 5 MG tablet [Pharmacy Med Name: LISINOPRIL 5 MG Tablet] 90 tablet 0     Sig: TAKE 1 TABLET EVERY DAY       ACE Inhibitors (Including Combos) Protocol Failed - 11/4/2020  1:29 PM        Failed - Recent (12 mo) or future (30 days) visit within the authorizing provider's specialty     Patient has had an office visit with the authorizing provider or a provider within the authorizing providers department within the previous 12 mos or has a future within next 30 days. See \"Patient Info\" tab in inbasket, or \"Choose Columns\" in Meds & Orders section of the refill encounter.              Failed - Normal serum creatinine on file in past 12 months     Recent Labs   Lab Test 04/19/19  1309   CR 0.97       Ok to refill medication if creatinine is low          Failed - Normal serum potassium on file in past 12 months     Recent Labs   Lab Test 04/19/19  1309   POTASSIUM 5.0             Passed - Blood pressure under 140/90 in past 12 months     BP Readings from Last 3 Encounters:   12/04/19 132/80   09/17/19 (!) 146/88   09/06/19 132/70                 Passed - Medication is active on med list        Passed - Patient is age 18 or older        Passed - No active pregnancy on record        Passed - No positive pregnancy test within past 12 months          metoprolol tartrate (LOPRESSOR) 25 MG tablet [Pharmacy Med Name: METOPROLOL TARTRATE 25 MG Tablet] 180 tablet 0     Sig: TAKE 1 TABLET TWICE DAILY - ANNUAL APPOINTMENT IS NEEDED  FOR  MORE  REFILLS       Beta-Blockers Protocol Failed - 11/4/2020  1:29 PM        Failed - Recent (12 mo) or future (30 days) visit within the authorizing provider's specialty     Patient has had an office visit with the authorizing provider or a provider within " "the authorizing providers department within the previous 12 mos or has a future within next 30 days. See \"Patient Info\" tab in inbasket, or \"Choose Columns\" in Meds & Orders section of the refill encounter.              Passed - Blood pressure under 140/90 in past 12 months     BP Readings from Last 3 Encounters:   12/04/19 132/80   09/17/19 (!) 146/88   09/06/19 132/70                 Passed - Patient is age 6 or older        Passed - Medication is active on med list           This refill/encounter is being handled by a team outside your facility.  If action needs to be taken, please route the encounter back to your team that would normally handle it. Thank you. Elyssa Krishna R.N.          "

## 2020-11-14 ENCOUNTER — HEALTH MAINTENANCE LETTER (OUTPATIENT)
Age: 75
End: 2020-11-14

## 2020-12-01 ENCOUNTER — TELEPHONE (OUTPATIENT)
Dept: FAMILY MEDICINE | Facility: CLINIC | Age: 75
End: 2020-12-01

## 2020-12-01 NOTE — TELEPHONE ENCOUNTER
Patient noticed she has had a few heart palpitations the last few days and wanted to schedule an appointment. The palpitation was not painful, it felt like her heart skipped a beat.  Denies any chest pain, SOB, and dizziness.  She did have some ringing in her ear when she was doing yoga and was lifting arm over her head but nothing since then. She last fel the palpitation this morning. She did have an Aortic valve replacement in 2017 but has not seen a cardiologist since then.  Only appointment opening at [place] is on Friday and is virtual. Please advise.   Galina Garza RN

## 2020-12-01 NOTE — TELEPHONE ENCOUNTER
Reason for call:  Symptom   Symptom or request: Heart palpatations    Duration (how long have symptoms been present): started yesterday  Have you been treated for this before? Yes    Additional comments: na    Phone number to reach patient:  Cell number on file:    Telephone Information:   Mobile [phone]       Best Time:  any    Can we leave a detailed message on this number?  YES    Travel screening: Not Applicable

## 2020-12-02 NOTE — TELEPHONE ENCOUNTER
She can be scheduled for a Friday appointment with me, for a face to face encounter . Lets ask Team [facility] health unit coordinator to review appointment options for her    Missael Fair MD

## 2020-12-02 NOTE — TELEPHONE ENCOUNTER
Appointment scheduled for this Friday, December 4th at 1:30 p.m.    LM for patient to call me back and let me know if this time will work for her.  Esthela Belcher,

## 2020-12-03 NOTE — TELEPHONE ENCOUNTER
2nd message left for patient to let us know if the appointment time for tomorrow will be okay.  Esthela Belcher,

## 2020-12-04 ENCOUNTER — OFFICE VISIT (OUTPATIENT)
Dept: INTERNAL MEDICINE | Facility: CLINIC | Age: 75
End: 2020-12-04
Payer: COMMERCIAL

## 2020-12-04 VITALS
SYSTOLIC BLOOD PRESSURE: 146 MMHG | DIASTOLIC BLOOD PRESSURE: 90 MMHG | BODY MASS INDEX: 21.49 KG/M2 | TEMPERATURE: 97.9 F | HEART RATE: 62 BPM | WEIGHT: 115.6 LBS | RESPIRATION RATE: 14 BRPM | OXYGEN SATURATION: 99 %

## 2020-12-04 DIAGNOSIS — I10 HYPERTENSION GOAL BP (BLOOD PRESSURE) < 140/90: ICD-10-CM

## 2020-12-04 DIAGNOSIS — Z95.2 S/P AORTIC VALVE REPLACEMENT: ICD-10-CM

## 2020-12-04 DIAGNOSIS — N18.31 STAGE 3A CHRONIC KIDNEY DISEASE (H): ICD-10-CM

## 2020-12-04 DIAGNOSIS — E78.5 HYPERLIPIDEMIA WITH TARGET LDL LESS THAN 160: ICD-10-CM

## 2020-12-04 DIAGNOSIS — R00.0 RAPID HEART BEAT: Primary | ICD-10-CM

## 2020-12-04 DIAGNOSIS — H61.21 IMPACTED CERUMEN OF RIGHT EAR: ICD-10-CM

## 2020-12-04 DIAGNOSIS — I25.119 CORONARY ARTERY DISEASE INVOLVING NATIVE CORONARY ARTERY OF NATIVE HEART WITH ANGINA PECTORIS (H): ICD-10-CM

## 2020-12-04 DIAGNOSIS — Z82.49 FAMILY HISTORY OF ISCHEMIC HEART DISEASE: ICD-10-CM

## 2020-12-04 PROCEDURE — 99214 OFFICE O/P EST MOD 30 MIN: CPT | Mod: 25 | Performed by: INTERNAL MEDICINE

## 2020-12-04 PROCEDURE — 69209 REMOVE IMPACTED EAR WAX UNI: CPT | Mod: RT | Performed by: INTERNAL MEDICINE

## 2020-12-04 PROCEDURE — 93000 ELECTROCARDIOGRAM COMPLETE: CPT | Mod: 59 | Performed by: INTERNAL MEDICINE

## 2020-12-04 NOTE — PATIENT INSTRUCTIONS
We recommended     1. Wearing a Zio Patch to see what your heart rhythm is  2. We ran the basic blood work today [ follow up as soon as we have results ]  3. Recommended a blood pressure recheck with pharmacy and further follow up depending on those numbers

## 2020-12-04 NOTE — PROGRESS NOTES
"Subjective     Monika Serrano is a 75 year old female who presents to clinic today for the following health issues:    HPI       Chief Complaint   Patient presents with     RECHECK     heart palptations     Pain     sharp pain every so often in chest         Rapid heart beat  Hypertension goal BP (blood pressure) < 140/90  Coronary artery disease involving native coronary artery of native heart with angina pectoris (H)  Stage 3a chronic kidney disease  S/P aortic valve replacement  Hyperlipidemia with target LDL less than 160  Family history of ischemic heart disease  Impacted cerumen of right ear       I last met with this patient last summer in August 2019 . We spent the first part of this appointment today bringing us up to speed with the relevant issues and diagnoses . She is somewhat overdue for her follow up with me.    History of non-occlusive coronary artery disease   History of aortic valve replacement    History of hypercholesterolemia, not taking a statin medication   History of hypertension , appears to be sub-optimally treated   History of positive family history of coronary artery disease although not premature   History of chronic kidney disease stage 3 A. She was not aware of this diagnosis and we reviewed this in significant detail      She has some other chronic past medical history not so germane to today's office visit including obstructive sleep apnea, chronic sinus issues and fibromyalgia syndrome , her several prior office visits with Ear, Nose, and Throat specialist MD and other primary care for sinus issues are reviewed     Chief complaint:    Monday four nights ago she had some odd symptoms , described as a \" missing heart beat\". It seemed like from this her stress levels alyssa and she thought her blood pressure was higher and higher . She noted this somewhat irregular pattern with her heart rate and then by the evening it had resolved already     Was it her thyroid she asks ?    This " probably happened before she recognized , maybe a year ago. sometimes it's more of a rapid heart action  ? Versus jumped / skipped extra heart beat sensations. She had some troubles describing this. Basically she reviewed these symptoms with her  and an opinion with her primary health care provider was suggested and I agreed, seen today squeezed in for an urgent appointment.      Otherwise, no angina pectoralis history, no syncope or presyncope. Otherwise the review of systems is negative       Review of Systems   Constitutional, HEENT, cardiovascular, pulmonary, gi and gu systems are negative, except as otherwise noted.      Objective    Pulse 62   Temp 97.9  F (36.6  C) (Oral)   Resp 14   Wt 52.4 kg (115 lb 9.6 oz)   SpO2 99%   BMI 21.49 kg/m    Body mass index is 21.49 kg/m .  Physical Exam   GENERAL: healthy, alert and no distress  EARS - right ear canal obstructed with ear wax impaction   NECK: no adenopathy, no asymmetry, masses, or scars and thyroid normal to palpation  RESP: lungs clear to auscultation - no rales, rhonchi or wheezes  CV: regular rate and rhythm, normal S1 S2, no S3 or S4, positive aortic valve murmur, click or rub, no peripheral edema and peripheral pulses strong  MS: no gross musculoskeletal defects noted, no edema    Orders Placed This Encounter   Procedures     EKG 12-lead complete w/read - Clinics     Orders Placed This Encounter   Procedures     REMOVE IMPACTED CERUMEN     Lipid panel reflex to direct LDL Fasting     Basic metabolic panel     TSH with free T4 reflex     CBC with platelets differential     EKG 12-lead complete w/read - Clinics     Zio Patch Holter Adult Pediatric Greater than 48 hrs             Assessment & Plan     Rapid heart beat  Today was an opportunity for a ground up review of multiple issues . Fundamentally I feel this patient is doing fine. We agreed to order a Zio Patch just to play it safe . Also run the basic gamut of testing for the the usual  suspects for palpitations.  - EKG 12-lead complete w/read - Clinics  - Zio Patch Holter Adult Pediatric Greater than 48 hrs; Future  - TSH with free T4 reflex  - CBC with platelets differential    Hypertension goal BP (blood pressure) < 140/90  I am concerned, her blood pressure is out of range. She was not willing to have a medication manipulation and instead chooses a 2-4 week pharmacy blood pressure recheck with further follow up depending on the test results at that time   - Basic metabolic panel    Coronary artery disease involving native coronary artery of native heart with angina pectoris (H)  Her diagnosis is really more of a nonocclusive coronary artery disease and so she's entirely free of angina pectoralis and instead our focus is on cardiovascular risk factor reduction   - Basic metabolic panel  - pushed to start treatment with statin medication. She agrees to reconsider after looking at fasting lipid panel numbers from today     Stage 3a chronic kidney disease  Problem is stable and ongoing monitoring  - Basic metabolic panel    S/P aortic valve replacement  Problem is stable and ongoing monitoring      Hyperlipidemia with target LDL less than 160  As detailed above   - Lipid panel reflex to direct LDL Fasting    Family history of ischemic heart disease  Noted as a point of historical importance     Impacted cerumen of right ear    - REMOVE IMPACTED CERUMEN  Successfully irrigated out by my medical assistant          Return for MA blood pressure recheck in 2-3 weeks.    Missael Fair MD  [facility]

## 2020-12-07 DIAGNOSIS — I25.119 CORONARY ARTERY DISEASE INVOLVING NATIVE CORONARY ARTERY OF NATIVE HEART WITH ANGINA PECTORIS (H): ICD-10-CM

## 2020-12-07 DIAGNOSIS — R00.0 RAPID HEART BEAT: ICD-10-CM

## 2020-12-07 DIAGNOSIS — E78.5 HYPERLIPIDEMIA WITH TARGET LDL LESS THAN 160: ICD-10-CM

## 2020-12-07 DIAGNOSIS — N18.31 STAGE 3A CHRONIC KIDNEY DISEASE (H): ICD-10-CM

## 2020-12-07 DIAGNOSIS — I10 HYPERTENSION GOAL BP (BLOOD PRESSURE) < 140/90: ICD-10-CM

## 2020-12-07 LAB
ANION GAP SERPL CALCULATED.3IONS-SCNC: 4 MMOL/L (ref 3–14)
BASOPHILS # BLD AUTO: 0.1 10E9/L (ref 0–0.2)
BASOPHILS NFR BLD AUTO: 1.1 %
BUN SERPL-MCNC: 19 MG/DL (ref 7–30)
CALCIUM SERPL-MCNC: 9.3 MG/DL (ref 8.5–10.1)
CHLORIDE SERPL-SCNC: 105 MMOL/L (ref 94–109)
CHOLEST SERPL-MCNC: 252 MG/DL
CO2 SERPL-SCNC: 29 MMOL/L (ref 20–32)
CREAT SERPL-MCNC: 1.02 MG/DL (ref 0.52–1.04)
DIFFERENTIAL METHOD BLD: NORMAL
EOSINOPHIL # BLD AUTO: 0.2 10E9/L (ref 0–0.7)
EOSINOPHIL NFR BLD AUTO: 2.8 %
ERYTHROCYTE [DISTWIDTH] IN BLOOD BY AUTOMATED COUNT: 13.5 % (ref 10–15)
GFR SERPL CREATININE-BSD FRML MDRD: 53 ML/MIN/{1.73_M2}
GLUCOSE SERPL-MCNC: 96 MG/DL (ref 70–99)
HCT VFR BLD AUTO: 42.1 % (ref 35–47)
HDLC SERPL-MCNC: 61 MG/DL
HGB BLD-MCNC: 13.7 G/DL (ref 11.7–15.7)
LDLC SERPL CALC-MCNC: 163 MG/DL
LYMPHOCYTES # BLD AUTO: 0.9 10E9/L (ref 0.8–5.3)
LYMPHOCYTES NFR BLD AUTO: 17.3 %
MCH RBC QN AUTO: 30.4 PG (ref 26.5–33)
MCHC RBC AUTO-ENTMCNC: 32.5 G/DL (ref 31.5–36.5)
MCV RBC AUTO: 93 FL (ref 78–100)
MONOCYTES # BLD AUTO: 0.6 10E9/L (ref 0–1.3)
MONOCYTES NFR BLD AUTO: 11.6 %
NEUTROPHILS # BLD AUTO: 3.5 10E9/L (ref 1.6–8.3)
NEUTROPHILS NFR BLD AUTO: 67.2 %
NONHDLC SERPL-MCNC: 191 MG/DL
PLATELET # BLD AUTO: 218 10E9/L (ref 150–450)
POTASSIUM SERPL-SCNC: 4.3 MMOL/L (ref 3.4–5.3)
RBC # BLD AUTO: 4.51 10E12/L (ref 3.8–5.2)
SODIUM SERPL-SCNC: 138 MMOL/L (ref 133–144)
TRIGL SERPL-MCNC: 138 MG/DL
TSH SERPL DL<=0.005 MIU/L-ACNC: 2 MU/L (ref 0.4–4)
WBC # BLD AUTO: 5.3 10E9/L (ref 4–11)

## 2020-12-07 PROCEDURE — 36415 COLL VENOUS BLD VENIPUNCTURE: CPT | Performed by: INTERNAL MEDICINE

## 2020-12-07 PROCEDURE — 85025 COMPLETE CBC W/AUTO DIFF WBC: CPT | Performed by: INTERNAL MEDICINE

## 2020-12-07 PROCEDURE — 80061 LIPID PANEL: CPT | Performed by: INTERNAL MEDICINE

## 2020-12-07 PROCEDURE — 84443 ASSAY THYROID STIM HORMONE: CPT | Performed by: INTERNAL MEDICINE

## 2020-12-07 PROCEDURE — 80048 BASIC METABOLIC PNL TOTAL CA: CPT | Performed by: INTERNAL MEDICINE

## 2020-12-23 ENCOUNTER — ANCILLARY PROCEDURE (OUTPATIENT)
Dept: MAMMOGRAPHY | Facility: CLINIC | Age: 75
End: 2020-12-23
Attending: INTERNAL MEDICINE
Payer: COMMERCIAL

## 2020-12-23 ENCOUNTER — TELEPHONE (OUTPATIENT)
Dept: FAMILY MEDICINE | Facility: CLINIC | Age: 75
End: 2020-12-23

## 2020-12-23 DIAGNOSIS — Z12.31 VISIT FOR SCREENING MAMMOGRAM: ICD-10-CM

## 2020-12-23 PROCEDURE — 77067 SCR MAMMO BI INCL CAD: CPT | Mod: TC | Performed by: RADIOLOGY

## 2020-12-23 NOTE — TELEPHONE ENCOUNTER
Are we waiting on results for Zio patch to be faxed to us from outside [facility] system? There was an order placed for Zio patch (holter) on 12/04, but status is still future order. Did have Holter monitor placed? Please call patient to clarify.

## 2020-12-23 NOTE — TELEPHONE ENCOUNTER
Patient calling to get the results of her Zio patch please call her after 2 as she has a mammogram appt before that.  Priya Dominguez,

## 2020-12-24 NOTE — TELEPHONE ENCOUNTER
This is an entirely unremarkable and normal study. No evidence of any cardiac arrhythmia     Missael Fair MD

## 2021-01-05 ENCOUNTER — DOCUMENTATION ONLY (OUTPATIENT)
Dept: SLEEP MEDICINE | Facility: CLINIC | Age: 76
End: 2021-01-05
Payer: COMMERCIAL

## 2021-01-05 DIAGNOSIS — R45.1 MOTOR RESTLESSNESS: ICD-10-CM

## 2021-01-05 DIAGNOSIS — G47.33 OSA (OBSTRUCTIVE SLEEP APNEA): ICD-10-CM

## 2021-01-05 DIAGNOSIS — F51.04 CHRONIC INSOMNIA: ICD-10-CM

## 2021-01-05 NOTE — PROGRESS NOTES
Patient was offered choice of vendor and chose [facility].  Patient Monika Serrano was set up at [facility] on January 5, 2021. Patient received a [employer] DreamStation Auto. Pressures were set at 6-10 cm H2O.   Patient s ramp is 5 cm H2O for 30 min and FLEX/EPR is A Flex 2.  Patient received a [employer] Mask name: AIRFIT P30I  Pillow mask size Small, heated tubing and heated humidifier.  Patient does need to meet compliance. Patient WILL SCHEDULE VIRTUAL FOLLOW UP DUE TO COVID-19.    Irasema Storm

## 2021-01-08 ENCOUNTER — DOCUMENTATION ONLY (OUTPATIENT)
Dept: SLEEP MEDICINE | Facility: CLINIC | Age: 76
End: 2021-01-08

## 2021-01-08 DIAGNOSIS — R45.1 MOTOR RESTLESSNESS: ICD-10-CM

## 2021-01-08 DIAGNOSIS — F51.04 CHRONIC INSOMNIA: ICD-10-CM

## 2021-01-08 DIAGNOSIS — G47.33 OSA (OBSTRUCTIVE SLEEP APNEA): ICD-10-CM

## 2021-01-08 NOTE — PROGRESS NOTES
3 DAY STM VISIT            Confirmed with patient at time of call- N/A Patient is still interested in STM service     Data only recheck data in CO    Replacement device: Yes  STM ordered by provider: Yes     Device type: Auto-CPAP  PAP settings from order::  CPAP min 6 cm  H20       CPAP max 10 cm  H20        Mask type:    Nasal Pillows        Assessment: Nightly usage over four hours.  Action plan: Patient removed from STM, STM not required or ordered. .       Total time spent on accessing and  interpreting remote patient PAP therapy data  10 minutes  Total time spent counseling, coaching  and reviewing PAP therapy data with patient  0 minutes  [id number] no

## 2021-01-15 DIAGNOSIS — R00.0 RAPID HEART BEAT: ICD-10-CM

## 2021-01-29 ENCOUNTER — OFFICE VISIT (OUTPATIENT)
Dept: FAMILY MEDICINE | Facility: CLINIC | Age: 76
End: 2021-01-29
Payer: COMMERCIAL

## 2021-01-29 VITALS
SYSTOLIC BLOOD PRESSURE: 142 MMHG | DIASTOLIC BLOOD PRESSURE: 72 MMHG | TEMPERATURE: 98.1 F | HEART RATE: 120 BPM | WEIGHT: 120 LBS | BODY MASS INDEX: 22.31 KG/M2

## 2021-01-29 DIAGNOSIS — Z95.2 S/P AORTIC VALVE REPLACEMENT: ICD-10-CM

## 2021-01-29 DIAGNOSIS — I25.119 CORONARY ARTERY DISEASE INVOLVING NATIVE CORONARY ARTERY OF NATIVE HEART WITH ANGINA PECTORIS (H): Chronic | ICD-10-CM

## 2021-01-29 DIAGNOSIS — H61.23 CERUMINOSIS, BILATERAL: Primary | ICD-10-CM

## 2021-01-29 DIAGNOSIS — I10 ESSENTIAL HYPERTENSION WITH GOAL BLOOD PRESSURE LESS THAN 140/90: ICD-10-CM

## 2021-01-29 DIAGNOSIS — N18.31 STAGE 3A CHRONIC KIDNEY DISEASE (H): ICD-10-CM

## 2021-01-29 PROCEDURE — 99214 OFFICE O/P EST MOD 30 MIN: CPT | Mod: 25 | Performed by: NURSE PRACTITIONER

## 2021-01-29 PROCEDURE — 69210 REMOVE IMPACTED EAR WAX UNI: CPT | Performed by: NURSE PRACTITIONER

## 2021-01-29 RX ORDER — LISINOPRIL 5 MG/1
TABLET ORAL
Qty: 90 TABLET | Refills: 1 | Status: SHIPPED | OUTPATIENT
Start: 2021-01-29 | End: 2021-07-30

## 2021-01-29 RX ORDER — METOPROLOL TARTRATE 25 MG/1
TABLET, FILM COATED ORAL
Qty: 180 TABLET | Refills: 1 | Status: SHIPPED | OUTPATIENT
Start: 2021-01-29 | End: 2021-07-30

## 2021-01-29 ASSESSMENT — PAIN SCALES - GENERAL: PAINLEVEL: NO PAIN (0)

## 2021-01-29 NOTE — PROCEDURES
Cerumenosis is noted.  Wax was attempted to be removed by syringing and manual debridement.  Due to patient discomfort and a use of two bottles of water to clear out wax, procedure was stopped.  Patient to use over the counter debrox for the next several nights and return for repeat ear wash early next week.      Shae Reyes, CNP

## 2021-01-29 NOTE — PROGRESS NOTES
Assessment & Plan     S/P aortic valve replacement  Patient to follow-up with cardiology next week.  I advised that she increase her blood pressure meds, but she feels that the act of checking her blood pressure raises it.  She would like to discuss with cardiology before increasing.  - metoprolol tartrate (LOPRESSOR) 25 MG tablet; TAKE 1 TABLET TWICE DAILY    Coronary artery disease involving native coronary artery of native heart with angina pectoris (H)  As above.   - lisinopril (ZESTRIL) 5 MG tablet; TAKE 1 TABLET EVERY DAY    Stage 3a chronic kidney disease  As above.   - lisinopril (ZESTRIL) 5 MG tablet; TAKE 1 TABLET EVERY DAY    Essential hypertension with goal blood pressure less than 140/90  As above.   - lisinopril (ZESTRIL) 5 MG tablet; TAKE 1 TABLET EVERY DAY    Ceruminosis, bilateral  See procedure note.  - REMOVE IMPACTED CERUMEN                             No follow-ups on file.    GONZALEZ Xiao CNP  [facility] JOSE Frank is a 75 year old who presents to clinic today for the following health issues     HPI       Patient is having hearing aids fitted next week and requests wax to be removed prior to this.      Patient requests refills of her blood pressure meds.    Review of Systems   Constitutional, HEENT, cardiovascular, pulmonary, gi and gu systems are negative, except as otherwise noted.      Objective    BP (!) 142/72   Pulse 120   Temp 98.1  F (36.7  C) (Oral)   Wt 54.4 kg (120 lb)   BMI 22.31 kg/m    Body mass index is 22.31 kg/m .  Physical Exam   GENERAL: healthy, alert and no distress  HENT: normal cephalic/atraumatic and both ears: occluded with wax  MS: no gross musculoskeletal defects noted, no edema

## 2021-02-01 ENCOUNTER — ALLIED HEALTH/NURSE VISIT (OUTPATIENT)
Dept: NURSING | Facility: CLINIC | Age: 76
End: 2021-02-01
Payer: COMMERCIAL

## 2021-02-01 DIAGNOSIS — H61.20 IMPACTED CERUMEN: Primary | ICD-10-CM

## 2021-02-04 ENCOUNTER — TRANSFERRED RECORDS (OUTPATIENT)
Dept: HEALTH INFORMATION MANAGEMENT | Facility: CLINIC | Age: 76
End: 2021-02-04

## 2021-02-04 ENCOUNTER — IMMUNIZATION (OUTPATIENT)
Dept: NURSING | Facility: CLINIC | Age: 76
End: 2021-02-04
Payer: COMMERCIAL

## 2021-02-04 PROCEDURE — 91300 PR COVID VAC PFIZER DIL RECON 30 MCG/0.3 ML IM: CPT

## 2021-02-04 PROCEDURE — 0001A PR COVID VAC PFIZER DIL RECON 30 MCG/0.3 ML IM: CPT

## 2021-02-25 ENCOUNTER — IMMUNIZATION (OUTPATIENT)
Dept: NURSING | Facility: CLINIC | Age: 76
End: 2021-02-25
Attending: FAMILY MEDICINE
Payer: COMMERCIAL

## 2021-02-25 PROCEDURE — 91300 PR COVID VAC PFIZER DIL RECON 30 MCG/0.3 ML IM: CPT

## 2021-02-25 PROCEDURE — 0002A PR COVID VAC PFIZER DIL RECON 30 MCG/0.3 ML IM: CPT

## 2021-04-03 ENCOUNTER — HEALTH MAINTENANCE LETTER (OUTPATIENT)
Age: 76
End: 2021-04-03

## 2021-04-08 ENCOUNTER — ALLIED HEALTH/NURSE VISIT (OUTPATIENT)
Dept: NURSING | Facility: CLINIC | Age: 76
End: 2021-04-08
Payer: COMMERCIAL

## 2021-04-08 DIAGNOSIS — H61.20 IMPACTED CERUMEN: Primary | ICD-10-CM

## 2021-05-07 ENCOUNTER — ALLIED HEALTH/NURSE VISIT (OUTPATIENT)
Dept: FAMILY MEDICINE | Facility: CLINIC | Age: 76
End: 2021-05-07
Payer: COMMERCIAL

## 2021-05-07 VITALS — SYSTOLIC BLOOD PRESSURE: 136 MMHG | DIASTOLIC BLOOD PRESSURE: 82 MMHG

## 2021-05-07 DIAGNOSIS — Z01.30 BP CHECK: Primary | ICD-10-CM

## 2021-05-07 PROCEDURE — 99207 PR NO CHARGE NURSE ONLY: CPT | Performed by: INTERNAL MEDICINE

## 2021-05-07 NOTE — PROGRESS NOTES
Monika Serrano was evaluated at [facility] on May 7, 2021 at which time her blood pressure was:    BP Readings from Last 3 Encounters:   05/07/21 136/82   01/29/21 (!) 142/72   12/04/20 (!) 146/90     Pulse Readings from Last 3 Encounters:   01/29/21 120   12/04/20 62   09/17/19 63       Reviewed lifestyle modifications for blood pressure control and reduction: including making healthy food choices, managing weight, getting regular exercise, smoking cessation, reducing alcohol consumption, monitoring blood pressure regularly.     Symptoms: None    BP Goal:< 140/90 mmHg    BP Assessment:  BP at goal    Potential Reasons for BP too high: NA - Not applicable    BP Follow-Up Plan: Recheck BP in 6 months at pharmacy    Recommendation to Provider: Continue with current plan.     Note completed by: Thank you,  Radha Villatoro, PharmD  [facility]  [phone]

## 2021-06-07 ENCOUNTER — OFFICE VISIT (OUTPATIENT)
Dept: INTERNAL MEDICINE | Facility: CLINIC | Age: 76
End: 2021-06-07
Payer: COMMERCIAL

## 2021-06-07 VITALS
OXYGEN SATURATION: 98 % | DIASTOLIC BLOOD PRESSURE: 88 MMHG | WEIGHT: 116.2 LBS | RESPIRATION RATE: 16 BRPM | HEART RATE: 70 BPM | BODY MASS INDEX: 21.6 KG/M2 | SYSTOLIC BLOOD PRESSURE: 136 MMHG | TEMPERATURE: 98.1 F

## 2021-06-07 DIAGNOSIS — I10 HYPERTENSION GOAL BP (BLOOD PRESSURE) < 140/90: Primary | ICD-10-CM

## 2021-06-07 PROCEDURE — 99215 OFFICE O/P EST HI 40 MIN: CPT | Performed by: INTERNAL MEDICINE

## 2021-06-07 NOTE — PROGRESS NOTES
Assessment & Plan     Hypertension goal BP (blood pressure) < 140/90  today's appointment was virtually all in discussion. I reviewed with patient the situation with her blood pressure. In my best professional opinion I conclude that her blood pressure is inadequately controlled. While I completely respect her involvement with her Chinese medicine practitioner and her efforts with naturalistic remedies for her health, I do feel a requirement to base my own decision making on what I know best which is allopathic evidence based medicine and I am recommending she either a] increase her lisinopril from 5 to 10 milligrams versus b] increase her metoprolol from 12.5 milligrams 2 times a day to 25 milligram 2 times a day. In the end she is choosing based on her own sense of things to leave her medications the same and without change. If she changes her mind she can call the clinic for this medication adjustment and we can do that if she calls. I did review all the circumstances of her recent emergency room evaluation , due to her transient paresthesias which have resolved. It was the opinion of the emergency room physician that these were not actual stroke like symptoms and further workup with neurologist and  / or central nervous system radiographic images are not felt necessary at this point and I agree. I also expressed that it is ok to follow medical advice from a Chinese medicine practitioner but that ultimately one finds there is a certain collision that can occur between different belief systems and that in the end  Am not able to speak with any authority over the values, plusses or minuses of the plan of care to include treatment with herbal medications      Review of the result(s) of each unique test - labs from the emergency room yesterday  Prescription drug management  40 minutes spent on the date of the encounter doing chart review, history and exam, documentation and further activities per the note      No  follow-ups on file.    Missael Fair MD  [facility] JOSE Frank is a 75 year old who presents for the following health issues   Encounter Diagnosis   Name Primary?     Hypertension goal BP (blood pressure) < 140/90 Yes     HPI     ED/UC Followup:    Facility:  [facility] ER  Date of visit: 06/06/2021  Reason for visit: Hypertension  Current Status: still having high readings, feeling better     We did discuss a chinese herb that was recommended for her to take by her acupuncturist , we did a bit of searching and found an herb called Tianma (Gastrodia) and patient is very interested in this herb. Along with also Hilario Gage, both were recommended by her Chinese medicine practitioner . See as detailed above with the assessment and plan section      BP Readings from Last 6 Encounters:   06/07/21 (!) 160/80   05/07/21 136/82   01/29/21 (!) 142/72   12/04/20 (!) 146/90   12/04/19 132/80   09/17/19 (!) 146/88     Status post bovine aortic valve replacement for aortic stenosis 4 years ago.     She was taken by her  to the [facility] emergency room, highest 190/102 at that time but more often her blood pressure reading are in the 140-160/ 80-90 level       Review of Systems   Constitutional, HEENT, cardiovascular, pulmonary, gi and gu systems are negative, except as otherwise noted.      Objective    BP (!) 151/90   Pulse 70   Temp 98.1  F (36.7  C) (Oral)   Resp 16   Wt 52.7 kg (116 lb 3.2 oz)   SpO2 98%   BMI 21.60 kg/m    Body mass index is 21.6 kg/m .  Physical Exam   GENERAL: healthy, alert and no distress  EYES: Eyes grossly normal to inspection, PERRL and conjunctivae and sclerae normal  NECK: no adenopathy, no asymmetry, masses, or scars and thyroid normal to palpation  RESP: lungs clear to auscultation - no rales, rhonchi or wheezes  CV: regular rate and rhythm, normal S1 S2, no S3 or S4, no murmur, click or rub, no peripheral edema and peripheral  pulses strong  MS: no gross musculoskeletal defects noted, no edema    No orders of the defined types were placed in this encounter.

## 2021-06-07 NOTE — PATIENT INSTRUCTIONS
We discussed many different issues around treatment of hypertension . It's complicated !    We suggested     1. Increase the lisinopril from 5 to 10 milligrams and leave the Lopressor [metoprolol] the same and continue with ongoing blood pressure monitoring   2. Leave the lisinopril and Lopressor [metoprolol] doses the same and continue with ongoing blood pressure monitoring   3. Feel free to contact me back if you decide you want to increase the lisinopril !    Missael Fair MD

## 2021-07-01 ENCOUNTER — TELEPHONE (OUTPATIENT)
Dept: SLEEP MEDICINE | Facility: CLINIC | Age: 76
End: 2021-07-01

## 2021-07-01 NOTE — TELEPHONE ENCOUNTER
Reason for call:  Other   Patient called regarding (reason for call): call back  Additional comments: Patient has one of the recalled cpap machines and has registered it with [employer]. She has questions on whether she should continue to use it or what she should do if not.     Phone number to reach patient:  Cell number on file:    Telephone Information:   Mobile [phone]       Best Time:  any    Can we leave a detailed message on this number?  YES    Travel screening: Negative

## 2021-07-29 DIAGNOSIS — I25.119 CORONARY ARTERY DISEASE INVOLVING NATIVE CORONARY ARTERY OF NATIVE HEART WITH ANGINA PECTORIS (H): Chronic | ICD-10-CM

## 2021-07-29 DIAGNOSIS — I10 ESSENTIAL HYPERTENSION WITH GOAL BLOOD PRESSURE LESS THAN 140/90: ICD-10-CM

## 2021-07-29 DIAGNOSIS — Z95.2 S/P AORTIC VALVE REPLACEMENT: ICD-10-CM

## 2021-07-29 DIAGNOSIS — N18.31 STAGE 3A CHRONIC KIDNEY DISEASE (H): ICD-10-CM

## 2021-07-30 RX ORDER — LISINOPRIL 5 MG/1
TABLET ORAL
Qty: 90 TABLET | Refills: 1 | Status: SHIPPED | OUTPATIENT
Start: 2021-07-30 | End: 2021-08-12

## 2021-07-30 RX ORDER — METOPROLOL TARTRATE 25 MG/1
TABLET, FILM COATED ORAL
Qty: 180 TABLET | Refills: 1 | Status: SHIPPED | OUTPATIENT
Start: 2021-07-30 | End: 2022-02-03

## 2021-08-09 ENCOUNTER — TELEPHONE (OUTPATIENT)
Dept: INTERNAL MEDICINE | Facility: CLINIC | Age: 76
End: 2021-08-09

## 2021-08-09 ENCOUNTER — OFFICE VISIT (OUTPATIENT)
Dept: FAMILY MEDICINE | Facility: CLINIC | Age: 76
End: 2021-08-09
Payer: COMMERCIAL

## 2021-08-09 VITALS
SYSTOLIC BLOOD PRESSURE: 158 MMHG | DIASTOLIC BLOOD PRESSURE: 81 MMHG | WEIGHT: 121 LBS | HEART RATE: 62 BPM | TEMPERATURE: 98.1 F | BODY MASS INDEX: 22.49 KG/M2 | OXYGEN SATURATION: 98 %

## 2021-08-09 DIAGNOSIS — N18.31 STAGE 3A CHRONIC KIDNEY DISEASE (H): ICD-10-CM

## 2021-08-09 DIAGNOSIS — I25.119 CORONARY ARTERY DISEASE INVOLVING NATIVE CORONARY ARTERY OF NATIVE HEART WITH ANGINA PECTORIS (H): Chronic | ICD-10-CM

## 2021-08-09 DIAGNOSIS — I10 ESSENTIAL HYPERTENSION WITH GOAL BLOOD PRESSURE LESS THAN 140/90: ICD-10-CM

## 2021-08-09 DIAGNOSIS — I10 HYPERTENSION GOAL BP (BLOOD PRESSURE) < 140/90: ICD-10-CM

## 2021-08-09 DIAGNOSIS — H00.024 HORDEOLUM INTERNUM OF LEFT UPPER EYELID: Primary | ICD-10-CM

## 2021-08-09 PROCEDURE — 99215 OFFICE O/P EST HI 40 MIN: CPT | Performed by: PHYSICIAN ASSISTANT

## 2021-08-09 RX ORDER — ERYTHROMYCIN 5 MG/G
0.5 OINTMENT OPHTHALMIC
Qty: 3.5 G | Refills: 0 | Status: SHIPPED | OUTPATIENT
Start: 2021-08-09 | End: 2021-08-14

## 2021-08-09 NOTE — PROGRESS NOTES
Assessment & Plan   Problem List Items Addressed This Visit        Circulatory    Hypertension goal BP (blood pressure) < 140/90 (Chronic)      Other Visit Diagnoses     Hordeolum internum of left upper eyelid    -  Primary    Relevant Medications    erythromycin (ROMYCIN) 5 MG/GM ophthalmic ointment         Will treat sty as above with ointment and warm compresses. No changes in vision. She would like to take 2.5mg lisinopril going forward and will continue to monitor her bp daily. No signs/symptoms of eond organ damage. Will follow up with eye in one week if not improving and primary in one month for a bp recheck.    Complete history and physical exam as below. AF with normal VS except for elevated bp, which they will monitor at home and contact us if >140/90mmHg greater than 50% of the time.    DDx and Dx discussed with and explained to the pt to their satisfaction.  All questions were answered at this time. Pt expressed understanding of and agreement with this dx, tx, and plan. No further workup warranted and standard medication warnings given. I have given the patient a list of pertinent indications for re-evaluation. Will go to the Emergency Department if symptoms worsen or new concerning symptoms arise. Patient left in no apparent distress.     40 minutes spent on the date of the encounter doing chart review, history and exam, documentation and further activities per the note     See Patient Instructions    Return in about 1 month (around 9/9/2021) for a blood pressure recheck, or call 911/go to an ER anytime if worsening.    TERRI Mena  [facility] BETTIE Frank is a 75 year old who presents for the following health issues     HPI     Concern - Cyst on left upper eyelid  Onset: Noticed 2 days ago.   Description: red eye lid and swollen  Intensity: mild  Progression of Symptoms:  waxing and waning  Accompanying Signs & Symptoms: Couldn't open her eye all the way  this morning. The right eye is starting to feel off as well but might be sinus stuff.  Previous history of similar problem: no  Therapies tried and outcome: eye drops-systane eye drops, warm wash cloth on eye.    BP has been high but wants to be off of her lisinopril if possible. Has been talking BP's at home. Went off lisinopril, then took half tab yesterday and BP was still high and then took whole tab today. Log of Bps shows average was >140/90mm Hg.     Review of Systems   Constitutional, HEENT, cardiovascular, pulmonary, gi and gu systems are negative, except as otherwise noted.      Objective    BP (!) 158/81   Pulse 62   Temp 98.1  F (36.7  C) (Tympanic)   Wt 54.9 kg (121 lb)   SpO2 98%   BMI 22.49 kg/m    Body mass index is 22.49 kg/m .  Physical Exam  Vitals and nursing note reviewed.   Constitutional:       General: She is not in acute distress.     Appearance: Normal appearance. She is not diaphoretic.   HENT:      Head: Normocephalic and atraumatic.      Nose: Nose normal.   Eyes:      General: No scleral icterus.        Right eye: No discharge.         Left eye: No discharge.      Extraocular Movements: Extraocular movements intact.      Conjunctiva/sclera: Conjunctivae normal.      Pupils: Pupils are equal, round, and reactive to light.      Comments: Left upper eyelid has a 1mm internal pustule with surrounding scant erythema.   Pulmonary:      Effort: Pulmonary effort is normal. No respiratory distress.   Skin:     General: Skin is dry.      Coloration: Skin is not jaundiced or pale.   Neurological:      General: No focal deficit present.      Mental Status: She is alert. Mental status is at baseline.   Psychiatric:         Mood and Affect: Mood normal.         Behavior: Behavior normal.

## 2021-08-09 NOTE — TELEPHONE ENCOUNTER
Received call from patient. She stated that she has been noticing a cyst on her L eyelid, beginning on Saturday. She states she has been utilizing warm compresses to the eyelid and this has been helping. No visual changes, pain or discharge from eye. She requested to be seen same-day. Scheduled for same-day appointment at [facility]. Patient agrees with plan.    KEYA Farias RN  [facility]

## 2021-08-09 NOTE — PATIENT INSTRUCTIONS
Caitie Frank,    Thank you for allowing [facility] to manage your care.    If you have any questions or concerns, please feel free to call us at [phone]    Sincerely,    Spencer Erickson PA-C    Did you know?      You can schedule a video visit for follow-up appointments as well as future appointments for certain conditions.  Please see the below link.     [email or web address]    If you have not already done so,  I encourage you to sign up for [employer] ([email or web address]).  This will allow you to review your results, securely communicate with a provider, and schedule virtual visits as well.      Patient Education     Sty (or Stye)  A sty is when the oil gland of the eyelid becomes inflamed. It may develop into an infection with a small pocket of pus (an abscess). This can cause pain, redness, and swelling. In early stages, a sty is treated with antibiotic cream, eye drops, or a small towel soaked in warm water (a warm compress). More severe cases may need to be opened and drained by a healthcare provider.   Home care    Eye drops or ointment are often prescribed to treat the infection. Use these as directed.     Artificial tears may also be used to lubricate the eye and make it more comfortable. You can buy these over the counter without a prescription. Talk with your healthcare provider before using any over-the-counter treatment for a sty.    Apply a warm, damp towel to the affected area for at least 5 minutes, 3 to 4 times a day for a week. Warm compresses open the pores and speed the healing. Make sure the compresses are not too hot, as they may burn your eyelid.    Sometimes the sty will drain with this treatment alone. If this happens, keep using the antibiotic until all the redness and swelling are gone.    Wash your hands before and after touching the infected eyelid.    Don t squeeze or try to break open the sty.    Follow-up care  Follow up  with your healthcare provider, or as advised.   When to seek medical advice  Call your healthcare provider or seek medical care right away if any of these occur:     Increase in swelling or redness around the eyelid after 48 to 72 hours    Increase in eye pain or the eyelid blisters    Increase in warmth--the eyelid feels hot    Drainage of blood or thick pus from the sty    Blister on the eyelid    Inability to open the eyelid due to swelling    Fever of 100.4 F (38 C) or above, or as directed by your provider    Vision changes    Headache or stiff neck    The sty comes back  Rafael last reviewed this educational content on 6/1/2020 2000-2021 [employer]. All rights reserved. This information is not intended as a substitute for professional medical care. Always follow your healthcare professional's instructions.

## 2021-08-12 RX ORDER — LISINOPRIL 5 MG/1
2.5 TABLET ORAL DAILY
Qty: 90 TABLET | Refills: 0 | COMMUNITY
Start: 2021-08-12 | End: 2022-02-03

## 2021-09-12 ENCOUNTER — HEALTH MAINTENANCE LETTER (OUTPATIENT)
Age: 76
End: 2021-09-12

## 2021-09-29 ENCOUNTER — NURSE TRIAGE (OUTPATIENT)
Dept: FAMILY MEDICINE | Facility: CLINIC | Age: 76
End: 2021-09-29

## 2021-09-29 NOTE — TELEPHONE ENCOUNTER
"Patient called the clinic.  She report experiencing congestion to both ears especially the left ear.  She reports that she cannot hear with both ears and running nose.  Patient states the her ears pop for a few minutes when she blows her nose or rubbers her hand over the ear.  Patient reports that her symptoms started on Monday, 9/27/21.  She reports history of ear congestion requiring ear wash and treatment.   Patient has tried the over the counter Debrox ear drops  X 2 days with little to no relief.  Patient denies any other symptoms or concerns at this time.    Patient would like to be seen in clinic today for further evaluation and treatment (ear wash).  No appointments available at the 3 clinics ([facility] or [place]).  Patient declined to seek medical assistance at the  today due to financial expenses.  Assisted patient with scheduling an office visit for 9/30/21.    Patient was advised to call the clinic [phone] or seek medical assistance.  or ER if the symptoms persist or worsen before scheduled appointment.  Patient verbalized understanding and has no further questions or concerns at this time.      Reason for Disposition    Ear congestion present > 48 hours    Patient wants to be seen    Additional Information    Negative: Ear pain is the main symptom    Negative: Hearing loss (complete or partial) is the main symptom    Negative: Earwax is the main concern    Negative: Has nasal allergies and they are acting up    Negative: Earache lasts > 1 hour    Negative: Pus or cloudy discharge from ear canal    Answer Assessment - Initial Assessment Questions  1. LOCATION: \"Which ear is involved?\"        Both ears especially the left  2. SENSATION: \"Describe how the ear feels.\"       Cannot hear   3. ONSET:  \"When did the ear symptoms start?\"        Monday  4. PAIN: \"Do you also have an earache?\" If so, ask: \"How bad is it?\" (Scale 1-10; or mild, moderate, severe)      mild  5. CAUSE: \"What do " "you think is causing the ear congestion?\"      History of ear congestion  6. URI: \"Do you have a runny nose or cough?\"       Running nose  7. NASAL ALLERGIES: \"Are there symptoms of hay fever, such as sneezing or a clear nasal discharge?\"      No    Protocols used: EAR - CONGESTION-A-OH      "

## 2021-09-30 ENCOUNTER — OFFICE VISIT (OUTPATIENT)
Dept: FAMILY MEDICINE | Facility: CLINIC | Age: 76
End: 2021-09-30
Payer: COMMERCIAL

## 2021-09-30 VITALS
DIASTOLIC BLOOD PRESSURE: 78 MMHG | BODY MASS INDEX: 22.35 KG/M2 | RESPIRATION RATE: 16 BRPM | TEMPERATURE: 97.6 F | OXYGEN SATURATION: 98 % | HEART RATE: 63 BPM | WEIGHT: 120.25 LBS | SYSTOLIC BLOOD PRESSURE: 144 MMHG

## 2021-09-30 DIAGNOSIS — H61.23 BILATERAL IMPACTED CERUMEN: Primary | ICD-10-CM

## 2021-09-30 DIAGNOSIS — R09.81 NASAL CONGESTION: ICD-10-CM

## 2021-09-30 PROCEDURE — 69209 REMOVE IMPACTED EAR WAX UNI: CPT | Mod: 50 | Performed by: PHYSICIAN ASSISTANT

## 2021-09-30 PROCEDURE — 99213 OFFICE O/P EST LOW 20 MIN: CPT | Mod: 25 | Performed by: PHYSICIAN ASSISTANT

## 2021-09-30 ASSESSMENT — PAIN SCALES - GENERAL: PAINLEVEL: NO PAIN (0)

## 2021-09-30 NOTE — PATIENT INSTRUCTIONS
Your ears were flushed in clinic with some wax removal.  I would like you to continue using Debrox for the next 10 days in both ears.  Return to clinic if you develop any new or worsening symptoms or if your hearing does not return to its baseline.    For nasal congestion, you can continue to use your sinus rinse.  You can also use Flonase every 1 to 2 days.  If you develop dental pain with Flonase, I would recommend stopping the medication or using it less often.    Please reach out with questions or concerns.      Patient Education     Impacted Earwax     Inner ear structures including ear canal and eardrum.   Impacted earwax is a buildup of the natural wax in the ear. Impacted earwax is very common. It can cause symptoms such as hearing loss. It can also make it hard for a healthcare provider to check your ear.   Understanding earwax  Tiny glands in your ear make substances that combine with dead skin cells to form earwax. Earwax helps protect your ear canal from water, dirt, infection, and injury. Over time, earwax travels from the inner part of your ear canal to the entrance of the canal. Then it falls away naturally. But in some cases, it can t travel to the entrance of the canal. This may be because of a health condition or objects put in the ear. With age, earwax tends to become harder and less fluid. Older adults are more likely to have problems with earwax buildup.   What causes impacted earwax?  Earwax can build up because of many health conditions. Some cause a physical blockage. Others cause too much earwax to be made. Health conditions that can cause earwax buildup include:     Bony blockage in the ear (osteoma or exostoses)    Infections, such as an outer ear infection (external otitis)    Skin disease, such as eczema    Autoimmune diseases, such as lupus    A narrowed ear canal from birth, chronic inflammation, or injury    Too much earwax because of injury    Too much earwax because of  water in the  ear canal  Putting objects in the ear again and again can also cause impacted earwax. For example, putting cotton swabs in the ear may push the wax deeper into the ear. Over time, this may cause blockage. Hearing aids, swimming plugs, and swim molds can also cause this problem when used again and again.   In some cases, the cause of impacted earwax is not known.  Symptoms of impacted earwax  Excess earwax often does not cause any symptoms, unless there is a large amount of buildup. Then it may cause symptoms such as:     Hearing loss    Earache    Sense of ear fullness    Itching in the ear    Odor from the ear    Ear drainage    Dizziness    Ringing in the ears    Cough  Treatment for impacted earwax  If you don t have symptoms, you may not need treatment. Often the earwax goes away on its own with time. If you have symptoms, you may have 1 or more treatments such as:     Ear drops to soften the earwax. This helps it leave the ear over time.    Rinsing the ear canal with water. This is done in a healthcare provider s office.    Removing the earwax with small tools. This is also done in a provider s office.  In rare cases, some treatments for earwax removal may cause complications such as:    Outer ear infection    Earache    Short-term hearing loss    Dizziness    Water trapped in the ear canal    Hole in the eardrum    Ringing in the ears    Bleeding from the ear  Talk with your healthcare provider about which risks apply most to you.  Healthcare providers don't advise using ear candles or ear vacuum kits. These methods are not shown to work and may cause problems.   Preventing impacted earwax  You may not be able to prevent impacted earwax if you have a health condition that causes it, such as eczema. In other cases, you may be able to prevent earwax buildup by:     Using ear drops once a week    Having a regular ear cleaning about every 6 months    Not using cotton swabs in the ear  When to call the healthcare  provider  Call your healthcare provider right away if you have:     Symptoms of impacted earwax    Severe symptoms after earwax removal, such as bleeding or severe ear pain    Rafael last reviewed this educational content on 9/1/2019 2000-2021 [employer]. All rights reserved. This information is not intended as a substitute for professional medical care. Always follow your healthcare professional's instructions.

## 2021-09-30 NOTE — PROGRESS NOTES
Assessment & Plan     Bilateral impacted cerumen  Patient is a 76-year-old female who presents to clinic due to ear congestion and decreased hearing on left consistent with previous cerumen impactions.  Vital signs without fever or tachycardia.  Physical exam significant for complete cerumen impaction of left canal and partial cerumen impaction of right canal irrigation completed with entirety of cerumen cleared.  Recommended continued use of Debrox x10 days to clear the remaining cerumen.  Patient to follow-up for any persistent, new, or worsening symptoms.  - MI REMOVAL IMPACTED CERUMEN IRRIGATION/LVG UNILAT    Nasal congestion  Patient notes nasal congestion for which she uses saline nasal sprays which helped symptoms, but did not alleviate them.  She did use Flonase in past with dental pain.  Recommended continued use of saline nasal spray and intermittent use of Flonase, approximately every few days, to help with congestion.    See Patient Instructions    Return in about 10 days (around 10/10/2021), or if symptoms worsen or fail to improve.    Arti Chin PA-C  [facility] BETTIE Frank is a 76 year old who presents for the following health issues     HPI     Acute Illness  Acute illness concerns: Left ear pain and popping with movement which feels like cerumen impaction. Right ear is also somewhat congested. Hearing muffled on both sides.  Patient has been using Debrox x3 days for the left ear.  Onset/Duration: 4 days  Symptoms:  Fever: no  Chills/Sweats: no  Headache (location?): no  Sinus Pressure: no  Conjunctivitis:  no  Ear Pain: YES: left  Rhinorrhea: no  Congestion: YES-ongoing for months. Patient uses sinu rinse. Patient was using Flonase but this caused dental pain.   Sore Throat: no  Cough: YES-non-productive  Wheeze: no  Decreased Appetite: no  Nausea: no  Vomiting: no  Diarrhea: no  Dysuria/Freq.: no  Dysuria or Hematuria: no  Fatigue/Achiness: no  Sick/Strep  Exposure: no  Therapies tried and outcome: Debrox          Objective    BP (!) 144/78   Pulse 63   Temp 97.6  F (36.4  C) (Tympanic)   Resp 16   Wt 54.5 kg (120 lb 4 oz)   LMP  (LMP Unknown)   SpO2 98%   BMI 22.35 kg/m    Body mass index is 22.35 kg/m .  Physical Exam  Vitals and nursing note reviewed.   Constitutional:       General: She is not in acute distress.     Appearance: Normal appearance.   HENT:      Head: Normocephalic and atraumatic.      Right Ear: Tympanic membrane, ear canal and external ear normal. There is impacted cerumen (20%-light brown color).      Left Ear: Tympanic membrane, ear canal and external ear normal. There is impacted cerumen (100%-light brown color).      Nose: Congestion present. No rhinorrhea.      Mouth/Throat:      Mouth: Mucous membranes are moist.      Pharynx: Oropharynx is clear. No oropharyngeal exudate or posterior oropharyngeal erythema.   Eyes:      Extraocular Movements: Extraocular movements intact.      Pupils: Pupils are equal, round, and reactive to light.   Cardiovascular:      Rate and Rhythm: Normal rate.   Pulmonary:      Effort: Pulmonary effort is normal.   Musculoskeletal:         General: Normal range of motion.      Cervical back: Normal range of motion.   Lymphadenopathy:      Cervical: No cervical adenopathy.   Skin:     General: Skin is warm and dry.   Neurological:      General: No focal deficit present.      Mental Status: She is alert.   Psychiatric:         Mood and Affect: Mood normal.         Behavior: Behavior normal.

## 2021-09-30 NOTE — NURSING NOTE
Bilateral ears irrigated using warm water. I was unable to irrigate completely, Arti will check.  Elva Wing CMA

## 2021-10-24 ENCOUNTER — NURSE TRIAGE (OUTPATIENT)
Dept: NURSING | Facility: CLINIC | Age: 76
End: 2021-10-24

## 2021-10-24 NOTE — TELEPHONE ENCOUNTER
"Patient calling reporting pain in left leg \"to the side above ankle.\"  Patient stating the area has turned black and blue \"finger length in size.\" Stating below the area of bruising she has pain. Describes as mild. Denies change in pain level with ambulation. Reporting she is able to feel the \"vein.\" Denies redness or warmth. Patient is able to bear full weight.   Denies chest pain or difficulty breathing.    Reporting history of varicose vein that she feels has popped.    Disposition to see provider with in 4 hours.  Patient reporting she lives in a building with a nurse that she is going to have look at it first before making a decision on coming in.    Jennifer Black RN  [facility]      COVID 19 Nurse Triage Plan/Patient Instructions    Please be aware that novel coronavirus (COVID-19) may be circulating in the community. If you develop symptoms such as fever, cough, or SOB or if you have concerns about the presence of another infection including coronavirus (COVID-19), please contact your health care provider or visit [email or web address].     Disposition/Instructions    In-Person Visit with provider recommended. Reference Visit Selection Guide.    Thank you for taking steps to prevent the spread of this virus.  o Limit your contact with others.  o Wear a simple mask to cover your cough.  o Wash your hands well and often.    Resources    [facility]: About COVID-19: [email or web address]    CDC: What to Do If You're Sick: www.cdc.gov/coronavirus/2019-ncov/about/steps-when-sick.html    CDC: Ending Home Isolation: www.cdc.gov/coronavirus/2019-ncov/hcp/disposition-in-home-patients.html     CDC: Caring for Someone: www.cdc.gov/coronavirus/2019-ncov/if-you-are-sick/care-for-someone.html     [facility]: Interim Guidance for Hospital Discharge to Home: www.health.[facility].[place].us/diseases/coronavirus/hcp/hospdischarge.pdf    [facility] clinical trials (COVID-19 research studies): " clinicalaffairs.[facility]-clinical-trials     Below are the COVID-19 hotlines at the [place] Department of Health ([facility]). Interpreters are available.   o For health questions: Call 935-611-0817 or 1-752.404.2276 (7 a.m. to 7 p.m.)  o For questions about schools and childcare: Call 174-836-9298 or 1-547.894.8725 (7 a.m. to 7 p.m.)                     Reason for Disposition    [1] Thigh or calf pain AND [2] only 1 side AND [3] present > 1 hour    Additional Information    Negative: Looks like a broken bone or dislocated joint (e.g., crooked or deformed)    Negative: Sounds like a life-threatening emergency to the triager    Negative: Followed a leg injury    Negative: Leg swelling is main symptom    Negative: Back pain radiating (shooting) into leg(s)    Negative: Knee pain is main symptom    Negative: Ankle pain is main symptom    Negative: Pregnant    Negative: Postpartum (from 0 to 6 weeks after delivery)    Negative: Chest pain    Negative: Difficulty breathing    Negative: Entire foot is cool or blue in comparison to other side    Negative: Unable to walk    Negative: [1] Red area or streak AND [2] fever    Negative: [1] Swollen joint AND [2] fever    Negative: [1] Cast on leg or ankle AND [2] now increased pain    Negative: Patient sounds very sick or weak to the triager    Negative: [1] SEVERE pain (e.g., excruciating, unable to do any normal activities) AND [2] not improved after 2 hours of pain medicine    Protocols used: LEG PAIN-A-AH

## 2021-10-25 ENCOUNTER — OFFICE VISIT (OUTPATIENT)
Dept: FAMILY MEDICINE | Facility: CLINIC | Age: 76
End: 2021-10-25
Payer: COMMERCIAL

## 2021-10-25 VITALS
BODY MASS INDEX: 22.5 KG/M2 | HEIGHT: 62 IN | WEIGHT: 122.25 LBS | DIASTOLIC BLOOD PRESSURE: 90 MMHG | TEMPERATURE: 97.4 F | SYSTOLIC BLOOD PRESSURE: 155 MMHG | HEART RATE: 57 BPM

## 2021-10-25 DIAGNOSIS — I80.9 PHLEBITIS: ICD-10-CM

## 2021-10-25 DIAGNOSIS — H91.93 BILATERAL HEARING LOSS, UNSPECIFIED HEARING LOSS TYPE: ICD-10-CM

## 2021-10-25 DIAGNOSIS — I83.93 VARICOSE VEINS OF BOTH LOWER EXTREMITIES, UNSPECIFIED WHETHER COMPLICATED: Primary | ICD-10-CM

## 2021-10-25 PROCEDURE — 99213 OFFICE O/P EST LOW 20 MIN: CPT | Performed by: FAMILY MEDICINE

## 2021-10-25 ASSESSMENT — MIFFLIN-ST. JEOR: SCORE: 979.83

## 2021-10-25 NOTE — PROGRESS NOTES
"       Casi Frank is a 76 year old who presents for the following health issues     HPI     Left calf pain since Saturday evening        Review of Systems          Objective    BP (!) 163/95 (BP Location: Left arm, Patient Position: Chair, Cuff Size: Adult Regular)   Pulse 57   Temp 97.4  F (36.3  C) (Temporal)   Ht 1.562 m (5' 1.5\")   Wt 55.5 kg (122 lb 4 oz)   LMP  (LMP Unknown)   Breastfeeding No   BMI 22.72 kg/m    Body mass index is 22.72 kg/m .  Physical Exam    Full physical not done     Mentation and affect are fine    No tremor of speech or extremity    Patient wanted me to look in ears  Some decreased hearing     Tympanic membranes easily seen, normal; moderate amount of wax present    Regarding legs, the left lower leg has been the one she is concerned about    There is no difference in size between the legs    Zero pitting edema either side    Good sensation and strength in both legs    Neg straight leg raising test     No point tenderness except over one area on lateral left lower leg; this corresponds to a small linear bruise appearance.  Patient states this is new but she used to have a varicose vein in same location    She does have some scattered varicose veins on lower legs bilat        ASSESSMENT / PLAN:  (I83.93) Varicose veins of both lower extremities, unspecified whether complicated  (primary encounter diagnosis)  Comment: discussed in detail with patient.  No need to treat but she could go back to support stockings that she used to wear.   Plan: as above     (I80.9) Phlebitis  Comment: discussed in detail.  Could use occasional po nsaid over the counter or a topical nsaid like diclofenac gel.  Follow up prn  Plan: as above    Based on exam no need for testing for dvt; if symptoms worsen/ change, be seen    (H91.93) Bilateral hearing loss, unspecified hearing loss type  Comment: not enough wax to affect hearing; she had hearing aids in past  Plan: advised follow up with ent or " audiology       I reviewed the patient's medications, allergies, medical history, family history, and social history.    Marc Arboleda MD

## 2021-10-25 NOTE — PATIENT INSTRUCTIONS
Sparingly could use over the counter ibuprofen or naproxen orally    A topical option for antiinflammatory is diclofenac gel ( Voltaren gel )    Increase walking as able    Stay well hydrated

## 2021-12-03 ENCOUNTER — TELEPHONE (OUTPATIENT)
Dept: SLEEP MEDICINE | Facility: CLINIC | Age: 76
End: 2021-12-03
Payer: COMMERCIAL

## 2021-12-03 DIAGNOSIS — G47.33 OSA (OBSTRUCTIVE SLEEP APNEA): ICD-10-CM

## 2021-12-03 DIAGNOSIS — F51.04 CHRONIC INSOMNIA: ICD-10-CM

## 2021-12-03 DIAGNOSIS — R45.1 MOTOR RESTLESSNESS: ICD-10-CM

## 2021-12-03 NOTE — TELEPHONE ENCOUNTER
SPOKE W/ PT AND TOLD HER THAT SHE CAN THINK OF IT AS SHE IS PAYING FOR THE MACHINE THAT SHE WILL BE GETTING. TOLD HER THAT PT'S WHOS MACHINE WAS ALREADY PAID OFF JUST GOT THE NEW MACHINE AND DIDN'T HAVE TO PAY FOR IT BECAUSE IT IS A REPLACEMENT FOR THE DEFECTIVE MACHINE. PT STATED SO I JUST HAVE TO PAY SO I DON'T HAVE A BAD CREDIT RATING AND THEN HOPE FOR THE BEST. TOLD HER SHE WILL KEEP PAYING AND THEN WHEN SHE GETS THE NEW MACHINE SHE WILL BE THAT MUCH CLOSER TO HAVING IT PAID OFF. PT STATED SHE WILL CALL BILLING.

## 2021-12-17 ENCOUNTER — TELEPHONE (OUTPATIENT)
Dept: FAMILY MEDICINE | Facility: CLINIC | Age: 76
End: 2021-12-17
Payer: COMMERCIAL

## 2021-12-17 DIAGNOSIS — Z79.2 PROPHYLACTIC ANTIBIOTIC: Primary | ICD-10-CM

## 2021-12-17 RX ORDER — AMOXICILLIN 250 MG/1
1000 CAPSULE ORAL ONCE
Qty: 4 CAPSULE | Refills: 3 | Status: SHIPPED | OUTPATIENT
Start: 2021-12-17 | End: 2021-12-17

## 2021-12-17 NOTE — TELEPHONE ENCOUNTER
Patient reports history of aortic valve replacement.  She reports that she usually takes Amoxicillin 4 capsules 1 hour prior to dental work.  Patient has a dentist appointment 12/22/21.  Patient is requesting a prescription for the antibiotic to be sent to the local [facility] in [place] as soon as possible.    Please call patient [phone] with the provider's recommendations.

## 2022-02-03 ENCOUNTER — OFFICE VISIT (OUTPATIENT)
Dept: INTERNAL MEDICINE | Facility: CLINIC | Age: 77
End: 2022-02-03
Payer: COMMERCIAL

## 2022-02-03 VITALS — DIASTOLIC BLOOD PRESSURE: 90 MMHG | SYSTOLIC BLOOD PRESSURE: 144 MMHG

## 2022-02-03 DIAGNOSIS — Z12.31 VISIT FOR SCREENING MAMMOGRAM: ICD-10-CM

## 2022-02-03 DIAGNOSIS — Z23 NEED FOR PNEUMOCOCCAL VACCINATION: ICD-10-CM

## 2022-02-03 DIAGNOSIS — E78.5 HYPERLIPIDEMIA WITH TARGET LDL LESS THAN 160: ICD-10-CM

## 2022-02-03 DIAGNOSIS — Z23 NEED FOR SHINGLES VACCINE: ICD-10-CM

## 2022-02-03 DIAGNOSIS — N18.31 STAGE 3A CHRONIC KIDNEY DISEASE (H): ICD-10-CM

## 2022-02-03 DIAGNOSIS — Z00.00 ENCOUNTER FOR MEDICARE ANNUAL WELLNESS EXAM: Primary | ICD-10-CM

## 2022-02-03 DIAGNOSIS — Z95.2 S/P AORTIC VALVE REPLACEMENT: ICD-10-CM

## 2022-02-03 DIAGNOSIS — Z13.220 SCREENING FOR HYPERLIPIDEMIA: ICD-10-CM

## 2022-02-03 DIAGNOSIS — I10 ESSENTIAL HYPERTENSION WITH GOAL BLOOD PRESSURE LESS THAN 140/90: ICD-10-CM

## 2022-02-03 DIAGNOSIS — I25.119 CORONARY ARTERY DISEASE INVOLVING NATIVE CORONARY ARTERY OF NATIVE HEART WITH ANGINA PECTORIS (H): Chronic | ICD-10-CM

## 2022-02-03 LAB — HGB BLD-MCNC: 13.5 G/DL (ref 11.7–15.7)

## 2022-02-03 PROCEDURE — 80061 LIPID PANEL: CPT | Performed by: INTERNAL MEDICINE

## 2022-02-03 PROCEDURE — 80048 BASIC METABOLIC PNL TOTAL CA: CPT | Performed by: INTERNAL MEDICINE

## 2022-02-03 PROCEDURE — 36415 COLL VENOUS BLD VENIPUNCTURE: CPT | Performed by: INTERNAL MEDICINE

## 2022-02-03 PROCEDURE — 99213 OFFICE O/P EST LOW 20 MIN: CPT | Mod: 25 | Performed by: INTERNAL MEDICINE

## 2022-02-03 PROCEDURE — 82043 UR ALBUMIN QUANTITATIVE: CPT | Performed by: INTERNAL MEDICINE

## 2022-02-03 PROCEDURE — 99397 PER PM REEVAL EST PAT 65+ YR: CPT | Performed by: INTERNAL MEDICINE

## 2022-02-03 PROCEDURE — 85018 HEMOGLOBIN: CPT | Performed by: INTERNAL MEDICINE

## 2022-02-03 RX ORDER — METOPROLOL TARTRATE 25 MG/1
TABLET, FILM COATED ORAL
Qty: 180 TABLET | Refills: 1 | Status: SHIPPED | OUTPATIENT
Start: 2022-02-03 | End: 2022-06-07 | Stop reason: DRUGHIGH

## 2022-02-03 RX ORDER — LISINOPRIL 5 MG/1
2.5 TABLET ORAL DAILY
Qty: 90 TABLET | Refills: 0 | Status: CANCELLED | OUTPATIENT
Start: 2022-02-03

## 2022-02-03 RX ORDER — LOSARTAN POTASSIUM 25 MG/1
25 TABLET ORAL DAILY
Qty: 30 TABLET | Refills: 5 | Status: SHIPPED | OUTPATIENT
Start: 2022-02-03 | End: 2022-06-07

## 2022-02-03 ASSESSMENT — ENCOUNTER SYMPTOMS
HEMATOCHEZIA: 0
HEADACHES: 0
ARTHRALGIAS: 0
EYE PAIN: 0
JOINT SWELLING: 0
DYSURIA: 0
NERVOUS/ANXIOUS: 0
FREQUENCY: 1
MYALGIAS: 0
CHILLS: 0
PARESTHESIAS: 0
FEVER: 0
PALPITATIONS: 0
HEMATURIA: 0
SORE THROAT: 0
DIARRHEA: 0
DIZZINESS: 0
HEARTBURN: 0
WEAKNESS: 0
SHORTNESS OF BREATH: 0
ABDOMINAL PAIN: 0
NAUSEA: 0
CONSTIPATION: 0
COUGH: 1
BREAST MASS: 0

## 2022-02-03 ASSESSMENT — ACTIVITIES OF DAILY LIVING (ADL): CURRENT_FUNCTION: NO ASSISTANCE NEEDED

## 2022-02-03 NOTE — LETTER
February 7, 2022      Monika Serrano  [address] [address]        Dear ,    We are writing to inform you of your test results.    All of these tests are within acceptable limits , things look good         Resulted Orders   Albumin Random Urine Quantitative with Creat Ratio   Result Value Ref Range    Creatinine Urine mg/dL 110 mg/dL    Albumin Urine mg/L 24 mg/L    Albumin Urine mg/g Cr 21.82 0.00 - 25.00 mg/g Cr   BASIC METABOLIC PANEL   Result Value Ref Range    Sodium 140 133 - 144 mmol/L    Potassium 4.1 3.4 - 5.3 mmol/L    Chloride 106 94 - 109 mmol/L    Carbon Dioxide (CO2) 25 20 - 32 mmol/L    Anion Gap 9 3 - 14 mmol/L    Urea Nitrogen 23 7 - 30 mg/dL    Creatinine 1.10 (H) 0.52 - 1.04 mg/dL    Calcium 9.6 8.5 - 10.1 mg/dL    Glucose 101 (H) 70 - 99 mg/dL    GFR Estimate 51 (L) >60 mL/min/1.73m2      Comment:      Effective December 21, 2021 eGFRcr in adults is calculated using the 2021 CKD-EPI creatinine equation which includes age and gender (Jose Alfredo rodriguez al., NEJM, DOI: 10.1056/WGYXez9169889)   Lipid panel reflex to direct LDL Fasting   Result Value Ref Range    Cholesterol 231 (H) <200 mg/dL    Triglycerides 140 <150 mg/dL    Direct Measure HDL 61 >=50 mg/dL    LDL Cholesterol Calculated 142 (H) <=100 mg/dL    Non HDL Cholesterol 170 (H) <130 mg/dL    Patient Fasting > 8hrs? No     Narrative    Cholesterol  Desirable:  <200 mg/dL    Triglycerides  Normal:  Less than 150 mg/dL  Borderline High:  150-199 mg/dL  High:  200-499 mg/dL  Very High:  Greater than or equal to 500 mg/dL    Direct Measure HDL  Female:  Greater than or equal to 50 mg/dL   Male:  Greater than or equal to 40 mg/dL    LDL Cholesterol  Desirable:  <100mg/dL  Above Desirable:  100-129 mg/dL   Borderline High:  130-159 mg/dL   High:  160-189 mg/dL   Very High:  >= 190 mg/dL    Non HDL Cholesterol  Desirable:  130 mg/dL  Above Desirable:  130-159 mg/dL  Borderline High:  160-189 mg/dL  High:  190-219  mg/dL  Very High:  Greater than or equal to 220 mg/dL   Hemoglobin   Result Value Ref Range    Hemoglobin 13.5 11.7 - 15.7 g/dL       If you have any questions or concerns, please call the clinic at the number listed above.       Sincerely,      Missael Fair MD/bah

## 2022-02-03 NOTE — PATIENT INSTRUCTIONS
Patient Education   Personalized Prevention Plan  You are due for the preventive services outlined below.  Your care team is available to assist you in scheduling these services.  If you have already completed any of these items, please share that information with your care team to update in your medical record.  Health Maintenance Due   Topic Date Due     Kidney Microalbumin Urine Test  Never done     Urine Test  Never done     Zoster (Shingles) Vaccine (1 of 2) Never done     Pneumococcal Vaccine (1 of 1 - PPSV23) Never done     Eye Exam  04/30/2020     FALL RISK ASSESSMENT  12/04/2021     Basic Metabolic Panel  12/07/2021     Cholesterol Lab  12/07/2021     Hemoglobin  12/07/2021     Mammogram  12/23/2021       Signs of Hearing Loss      Hearing much better with one ear can be a sign of hearing loss.   Hearing loss is a problem shared by many people. In fact, it is one of the most common health problems, particularly as people age. Most people age 65 and older have some hearing loss. By age 80, almost everyone does. Hearing loss often occurs slowly over the years. So you may not realize your hearing has gotten worse.  Have your hearing checked  Call your healthcare provider if you:    Have to strain to hear normal conversation    Have to watch other people s faces very carefully to follow what they re saying    Need to ask people to repeat what they ve said    Often misunderstand what people are saying    Turn the volume of the television or radio up so high that others complain    Feel that people are mumbling when they re talking to you    Find that the effort to hear leaves you feeling tired and irritated    Notice, when using the phone, that you hear better with one ear than the other  achvr last reviewed this educational content on 1/1/2020 2000-2021 [employer]. All rights reserved. This information is not intended as a substitute for professional medical care. Always follow your  healthcare professional's instructions.          Urinary Incontinence, Female (Adult)   Urinary incontinence means loss of bladder control. This problem affects many women, especially as they get older. If you have incontinence, you may be embarrassed to ask for help. But know that this problem can be treated.   Types of Incontinence  There are different types of incontinence. Two of the main types are described here. You can have more than one type.     Stress incontinence. With this type, urine leaks when pressure (stress) is put on the bladder. This may happen when you cough, sneeze, or laugh. Stress incontinence most often occurs because the pelvic floor muscles that support the bladder and urethra are weak. This can happen after pregnancy and vaginal childbirth or a hysterectomy. It can also be due to excess body weight or hormone changes.    Urge incontinence (also called overactive bladder). With this type, a sudden urge to urinate is felt often. This may happen even though there may not be much urine in the bladder. The need to urinate often during the night is common. Urge incontinence most often occurs because of bladder spasms. This may be due to bladder irritation or infection. Damage to bladder nerves or pelvic muscles, constipation, and certain medicines can also lead to urge incontinence.  Treatment depends on the cause. Further evaluation is needed to find the type you have. This will likely include an exam and certain tests. Based on the results, you and your healthcare provider can then plan treatment. Until a diagnosis is made, the home care tips below can help ease symptoms.   Home care    Do pelvic floor muscle exercises, if they are prescribed. The pelvic floor muscles help support the bladder and urethra. Many women find that their symptoms improve when doing special exercises that strengthen these muscles. To do the exercises, contract the muscles you would use to stop your stream of urine.  But do this when you re not urinating. Hold for 10 seconds, then relax. Repeat 10 to 20 times in a row, at least 3 times a day. Your healthcare provider may give you other instructions for how to do the exercises and how often.    Keep a bladder diary. This helps track how often and how much you urinate over a set period of time. Bring this diary with you to your next visit with the provider. The information can help your provider learn more about your bladder problem.    Lose weight, if advised to by your provider. Extra weight puts pressure on the bladder. Your provider can help you create a weight-loss plan that s right for you. This may include exercising more and making certain diet changes.    Don't have foods and drinks that may irritate the bladder. These can include alcohol and caffeinated drinks.    Quit smoking. Smoking and other tobacco use can lead to a long-term (chronic) cough that strains the pelvic floor muscles. Smoking may also damage the bladder and urethra. Talk with your provider about treatments or methods you can use to quit smoking.    If drinking large amounts of fluid makes you have symptoms, you may be advised to limit your fluid intake. You may also be advised to drink most of your fluids during the day and to limit fluids at night.    If you re worried about urine leakage or accidents, you may wear absorbent pads to catch urine. Change the pads often. This helps reduce discomfort. It may also reduce the risk of skin or bladder infections.    Follow-up care  Follow up with your healthcare provider, or as directed. It may take some to find the right treatment for your problem. But healthy lifestyle changes can be made right away. These include such things as exercising on a regular basis, eating a healthy diet, losing weight (if needed), and quitting smoking. Your treatment plan may include special therapies or medicines. Certain procedures or surgery may also be options. Talk about any  questions you have with your provider.   When to seek medical advice  Call the healthcare provider right away if any of these occur:    Fever of 100.4 F (38 C) or higher, or as directed by your provider    Bladder pain or fullness    Belly swelling    Nausea or vomiting    Back pain    Weakness, dizziness, or fainting  Rafael last reviewed this educational content on 1/1/2020 2000-2021 [employer]. All rights reserved. This information is not intended as a substitute for professional medical care. Always follow your healthcare professional's instructions.

## 2022-02-03 NOTE — PROGRESS NOTES
"SUBJECTIVE:   Monika Serrano is a 76 year old female who presents for Preventive Visit.      Patient has been advised of split billing requirements and indicates understanding: Yes  Are you in the first 12 months of your Medicare coverage?  No    Healthy Habits:     In general, how would you rate your overall health?  Good    Frequency of exercise:  4-5 days/week    Duration of exercise:  30-45 minutes    Do you usually eat at least 4 servings of fruit and vegetables a day, include whole grains    & fiber and avoid regularly eating high fat or \"junk\" foods?  Yes    Taking medications regularly:  Yes    Medication side effects:  Other    Ability to successfully perform activities of daily living:  No assistance needed    Home Safety:  No safety concerns identified    Hearing Impairment:  Difficulty following a conversation in a noisy restaurant or crowded room, difficult to understand a speaker at a public meeting or Evangelical service, need to ask people to speak up or repeat themselves and difficulty understanding soft or whispered speech    In the past 6 months, have you been bothered by leaking of urine? Yes    In general, how would you rate your overall mental or emotional health?  Good      PHQ-2 Total Score: 0    Additional concerns today:  Yes    Has some classic Mixed stress and urge urinary incontinence , not interested in medications    Has had hearing aids without benefit , also has tinnitus issues , her son in law is an Ear, Nose, and Throat specialist MD     Do you feel safe in your environment? Yes    Have you ever done Advance Care Planning? (For example, a Health Directive, POLST, or a discussion with a medical provider or your loved ones about your wishes): Yes, patient states has an Advance Care Planning document and will bring a copy to the clinic.    Fall risk  Fallen 2 or more times in the past year?: No  Any fall with injury in the past year?: No    Cognitive Screening   1) Repeat 3 items " (Leader, Season, Table)    2) Clock draw: NORMAL  3) 3 item recall: Recalls 2 objects   Results: NORMAL clock, 1-2 items recalled: COGNITIVE IMPAIRMENT LESS LIKELY    Mini-CogTM Copyright S [employer]. Licensed by the author for use in [facility]; reprinted with permission (maggy@[facility]). All rights reserved.      Do you have sleep apnea, excessive snoring or daytime drowsiness?: yes  She has her cpap machine and mask and uses this regularly , we did review the pitfalls with nasal congestion and what to try, consider seeing Ear, Nose, and Throat specialist consultation     Reviewed and updated as needed this visit by clinical staff    Meds             Reviewed and updated as needed this visit by Provider               Social History     Tobacco Use     Smoking status: Never Smoker     Smokeless tobacco: Never Used   Substance Use Topics     Alcohol use: Yes     Alcohol/week: 2.0 standard drinks     Types: 2 Standard drinks or equivalent per week     Comment: 1 glass of wine every other month          Alcohol Use 2/3/2022   Prescreen: >3 drinks/day or >7 drinks/week? No   Prescreen: >3 drinks/day or >7 drinks/week? -   No flowsheet data found.      Current providers sharing in care for this patient include:   Patient Care Team:  Missael Fair MD as PCP - General (Internal Medicine)  Edy Cota MD as MD (Transplant)  Missael Fair MD as Assigned PCP  Xavi Crane MD as Assigned Sleep Provider    The following health maintenance items are reviewed in Epic and correct as of today:  Health Maintenance Due   Topic Date Due     MICROALBUMIN  Never done     URINALYSIS  Never done     ZOSTER IMMUNIZATION (1 of 2) Never done     Pneumococcal Vaccine: 65+ Years (1 of 1 - PPSV23) Never done     MEDICARE ANNUAL WELLNESS VISIT  03/12/2019     EYE EXAM  04/30/2020     FALL RISK ASSESSMENT  12/04/2021     BMP  12/07/2021     LIPID  12/07/2021     HEMOGLOBIN  12/07/2021     MAMMO SCREENING  12/23/2021     Lab work  is in process  Labs reviewed in EPIC  BP Readings from Last 3 Encounters:   02/03/22 (!) 144/90   10/25/21 (!) 155/90   09/30/21 (!) 144/78    Wt Readings from Last 3 Encounters:   10/25/21 55.5 kg (122 lb 4 oz)   09/30/21 54.5 kg (120 lb 4 oz)   08/09/21 54.9 kg (121 lb)                  Patient Active Problem List   Diagnosis     Fibromyalgia     Osteopenia     HAM (obstructive sleep apnea)- milld-moderate     Chronic insomnia     Hyperlipidemia with target LDL less than 160     zAdvanced directives, counseling/discussion     Anxiety     S/P aortic valve replacement     Coronary artery disease involving native coronary artery of native heart with angina pectoris (H)     Motor restlessness     Combined forms of age-related cataract, mild, of left eye     Combined forms of age-related cataract, mild-mod, of right eye     Stable branch retinal vein occlusion of right eye     Stage 3a chronic kidney disease (H)     Hypertension goal BP (blood pressure) < 140/90     Family history of ischemic heart disease     Past Surgical History:   Procedure Laterality Date     AORTIC VALVE REPLACEMENT  12/2016       Social History     Tobacco Use     Smoking status: Never Smoker     Smokeless tobacco: Never Used   Substance Use Topics     Alcohol use: Yes     Alcohol/week: 2.0 standard drinks     Types: 2 Standard drinks or equivalent per week     Comment: 1 glass of wine every other month      Family History   Problem Relation Age of Onset     Hypertension Mother      C.A.D. Mother         d. of MI age 75     Lipids Mother      Hypertension Father      C.A.D. Father         age 40, d. age 63 of MI     Lipids Father      C.A.D. Brother         MI age 50     Heart Disease Brother         valve replace     CJoseATENISHA. Brother      Cancer Brother      Obesity Sister      Suicide Brother      Heart Surgery Brother      Pulmonary Embolism Brother      Heart Surgery Brother      Heart Surgery Brother          Current Outpatient Medications    Medication Sig Dispense Refill     aspirin (ASA) 81 MG tablet Take 81 mg by mouth daily        lisinopril (ZESTRIL) 5 MG tablet Take 0.5 tablets (2.5 mg) by mouth daily TAKE 1 TABLET BY MOUTH EVERY DAY 90 tablet 0     MAGNESIUM OXIDE PO Take 200 mg by mouth as needed        metoprolol tartrate (LOPRESSOR) 25 MG tablet TAKE 1 TABLET BY MOUTH TWICE A  tablet 1     NEW MED Chinese herbs.       ORDER FOR DME Auto-cpap 6-10       UNABLE TO FIND MEDICATION NAME: Lycium Support  M: [email or web address] E:01/14/24, L:[id number]       UNABLE TO FIND MEDICATION NAME: Circulation SJ  M: [employer]  E:04/25/23  L:[id number]       UNABLE TO FIND MEDICATION NAME: Circulation  M:[employer]  E:02/25/23  L:[id number]       UNABLE TO FIND MEDICATION NAME: Hilario Bean Wan/Veronika Stamen Formula  M: [employer]  L:[id number]  E:01/21/23       VITAMIN D PO        Allergies   Allergen Reactions     Atorvastatin Cramps     Sulfa Drugs Rash     Recent Labs   Lab Test 12/07/20  1017 04/19/19  1309 01/23/19  1221   * 147* 151*   HDL 61 68 62   TRIG 138 80 123   ALT  --   --  21   CR 1.02 0.97  --    GFRESTIMATED 53* 57*  --    GFRESTBLACK 61 66  --    POTASSIUM 4.3 5.0  --    TSH 2.00  --  1.65      Orders Placed This Encounter   Procedures     MA SCREENING DIGITAL BILAT - Future  (s+30)     Albumin Random Urine Quantitative with Creat Ratio     BASIC METABOLIC PANEL     Lipid panel reflex to direct LDL Fasting     Hemoglobin         Mammogram Screening - Patient over age 75, has elected to continue with screening.  Pertinent mammograms are reviewed under the imaging tab.    Review of Systems   Constitutional: Negative for chills and fever.   HENT: Positive for ear pain and hearing loss. Negative for congestion and sore throat.    Eyes: Negative for pain and visual disturbance.   Respiratory: Positive for cough.  "Negative for shortness of breath.    Cardiovascular: Negative for chest pain, palpitations and peripheral edema.   Gastrointestinal: Negative for abdominal pain, constipation, diarrhea, heartburn, hematochezia and nausea.   Breasts:  Negative for tenderness, breast mass and discharge.   Genitourinary: Positive for frequency and urgency. Negative for dysuria, genital sores, hematuria, pelvic pain, vaginal bleeding and vaginal discharge.   Musculoskeletal: Negative for arthralgias, joint swelling and myalgias.   Skin: Negative for rash.   Neurological: Negative for dizziness, weakness, headaches and paresthesias.   Psychiatric/Behavioral: Negative for mood changes. The patient is not nervous/anxious.      Constitutional, HEENT, cardiovascular, pulmonary, gi and gu systems are negative, except as otherwise noted.    OBJECTIVE:   LMP  (LMP Unknown)  Estimated body mass index is 22.72 kg/m  as calculated from the following:    Height as of 10/25/21: 1.562 m (5' 1.5\").    Weight as of 10/25/21: 55.5 kg (122 lb 4 oz).  Physical Exam  GENERAL: healthy, alert and no distress  EYES: Eyes grossly normal to inspection, PERRL and conjunctivae and sclerae normal  HENT: ear canals and TM's normal, nose and mouth without ulcers or lesions  NECK: no adenopathy, no asymmetry, masses, or scars and thyroid normal to palpation  RESP: lungs clear to auscultation - no rales, rhonchi or wheezes  BREAST: normal without masses, tenderness or nipple discharge and no palpable axillary masses or adenopathy  CV: regular rate and rhythm, normal S1 S2, no S3 or S4, no murmur, click or rub, no peripheral edema and peripheral pulses strong  ABDOMEN: soft, nontender, no hepatosplenomegaly, no masses and bowel sounds normal  MS: no gross musculoskeletal defects noted, no edema  SKIN: no suspicious lesions or rashes  NEURO: Normal strength and tone, mentation intact and speech normal  PSYCH: mentation appears normal, affect normal/bright    Diagnostic " Test Results:  Labs reviewed in Epic  Orders Placed This Encounter   Procedures     MA SCREENING DIGITAL BILAT - Future  (s+30)     Albumin Random Urine Quantitative with Creat Ratio     BASIC METABOLIC PANEL     Lipid panel reflex to direct LDL Fasting     Hemoglobin         ASSESSMENT / PLAN:   (Z00.00) Encounter for Medicare annual wellness exam  (primary encounter diagnosis)  Comment: routine screening issues   Plan: see orders section of this encounter     (I25.119) Coronary artery disease involving native coronary artery of native heart with angina pectoris (H)  Comment: problem is stable and ongoing monitoring    Plan: Albumin Random Urine Quantitative with Creat         Ratio, BASIC METABOLIC PANEL, Hemoglobin,         losartan (COZAAR) 25 MG tablet            (N18.31) Stage 3a chronic kidney disease (H)  Comment: problem is stable and ongoing monitoring    Plan: Albumin Random Urine Quantitative with Creat         Ratio, BASIC METABOLIC PANEL            (I10) Essential hypertension with goal blood pressure less than 140/90  Comment: medication adjustments , lisinopril discontinued, losartan ( Cozaar ) started  Plan: Albumin Random Urine Quantitative with Creat         Ratio, BASIC METABOLIC PANEL, losartan (COZAAR)        25 MG tablet            (Z95.2) S/P aortic valve replacement  Comment: stable phase of chronic illness   Plan: metoprolol tartrate (LOPRESSOR) 25 MG tablet            (Z13.220) Screening for hyperlipidemia  Comment: routine screening   Plan: Lipid panel reflex to direct LDL Fasting            (Z12.31) Visit for screening mammogram  Comment: ordered   Plan: MA SCREENING DIGITAL BILAT - Future  (s+30)            (Z23) Need for shingles vaccine  Comment: recommended   Plan:     (Z23) Need for pneumococcal vaccination  Comment: recommended   Plan:     (E78.5) Hyperlipidemia with target LDL less than 160  Comment: fasting lipid panel   Plan:     Patient has been advised of split billing  "requirements and indicates understanding: Yes    COUNSELING:  Reviewed preventive health counseling, as reflected in patient instructions    Estimated body mass index is 22.72 kg/m  as calculated from the following:    Height as of 10/25/21: 1.562 m (5' 1.5\").    Weight as of 10/25/21: 55.5 kg (122 lb 4 oz).        She reports that she has never smoked. She has never used smokeless tobacco.      Appropriate preventive services were discussed with this patient, including applicable screening as appropriate for cardiovascular disease, diabetes, osteopenia/osteoporosis, and glaucoma.  As appropriate for age/gender, discussed screening for colorectal cancer, prostate cancer, breast cancer, and cervical cancer. Checklist reviewing preventive services available has been given to the patient.    Reviewed patients plan of care and provided an AVS. The Basic Care Plan (routine screening as documented in Health Maintenance) for Monika meets the Care Plan requirement. This Care Plan has been established and reviewed with the Patient.    Counseling Resources:  ATP IV Guidelines  Pooled Cohorts Equation Calculator  Breast Cancer Risk Calculator  Breast Cancer: Medication to Reduce Risk  FRAX Risk Assessment  ICSI Preventive Guidelines  Dietary Guidelines for Americans, 2010  [employer] MyPlate  ASA Prophylaxis  Lung CA Screening    Missael Fair MD  [facility]    Identified Health Risks:  "

## 2022-02-04 ENCOUNTER — TELEPHONE (OUTPATIENT)
Dept: SLEEP MEDICINE | Facility: CLINIC | Age: 77
End: 2022-02-04
Payer: COMMERCIAL

## 2022-02-04 DIAGNOSIS — F51.04 CHRONIC INSOMNIA: ICD-10-CM

## 2022-02-04 DIAGNOSIS — R45.1 MOTOR RESTLESSNESS: ICD-10-CM

## 2022-02-04 DIAGNOSIS — G47.33 OSA (OBSTRUCTIVE SLEEP APNEA): ICD-10-CM

## 2022-02-04 DIAGNOSIS — G47.01 INSOMNIA DUE TO MEDICAL CONDITION: ICD-10-CM

## 2022-02-04 DIAGNOSIS — G47.33 OSA (OBSTRUCTIVE SLEEP APNEA): Primary | ICD-10-CM

## 2022-02-04 LAB
ANION GAP SERPL CALCULATED.3IONS-SCNC: 9 MMOL/L (ref 3–14)
BUN SERPL-MCNC: 23 MG/DL (ref 7–30)
CALCIUM SERPL-MCNC: 9.6 MG/DL (ref 8.5–10.1)
CHLORIDE BLD-SCNC: 106 MMOL/L (ref 94–109)
CHOLEST SERPL-MCNC: 231 MG/DL
CO2 SERPL-SCNC: 25 MMOL/L (ref 20–32)
CREAT SERPL-MCNC: 1.1 MG/DL (ref 0.52–1.04)
CREAT UR-MCNC: 110 MG/DL
FASTING STATUS PATIENT QL REPORTED: NO
GFR SERPL CREATININE-BSD FRML MDRD: 51 ML/MIN/1.73M2
GLUCOSE BLD-MCNC: 101 MG/DL (ref 70–99)
HDLC SERPL-MCNC: 61 MG/DL
LDLC SERPL CALC-MCNC: 142 MG/DL
MICROALBUMIN UR-MCNC: 24 MG/L
MICROALBUMIN/CREAT UR: 21.82 MG/G CR (ref 0–25)
NONHDLC SERPL-MCNC: 170 MG/DL
POTASSIUM BLD-SCNC: 4.1 MMOL/L (ref 3.4–5.3)
SODIUM SERPL-SCNC: 140 MMOL/L (ref 133–144)
TRIGL SERPL-MCNC: 140 MG/DL

## 2022-02-16 ENCOUNTER — TRANSFERRED RECORDS (OUTPATIENT)
Dept: HEALTH INFORMATION MANAGEMENT | Facility: CLINIC | Age: 77
End: 2022-02-16
Payer: COMMERCIAL

## 2022-02-20 ENCOUNTER — NURSE TRIAGE (OUTPATIENT)
Dept: NURSING | Facility: CLINIC | Age: 77
End: 2022-02-20
Payer: COMMERCIAL

## 2022-02-21 NOTE — TELEPHONE ENCOUNTER
Patient put some ear drops in her eyes by accident.  She took a little bit of distilled water and tried to flush it and then put visine in her eyes.  The pain has gone away but the redness is still there.      Patient denies any vision changes, breathing issues or pain.    Explained to patient she needs to really flush out her eye.  I explained she needs to run the water over her eye for 2-3 min.  Patient started doing that on the phone.  She said the redness was going away.  Explained not to take her visine drops just flush and let it be.    Patient states she has a cpap machine and uses visine and natural tears regular and natural tears long lasting.  Patient states those are making her eyes irritated.  She would like to know if the provider would recommend anything else to use for her eyes to use with the cpap.    Will route message to provider for her to follow up.    Adelaida Guerrero RN   02/20/22 10:39 PM  [facility] Nurse Advisor              Reason for Disposition    Harmless chemical (see list in Background) in the eye    Additional Information    Negative: Acid or alkali was the chemical  (Exceptions: mild household bleach or ammonia)    Negative: [1] Unknown chemical AND [2] any eye symptoms (e.g., pain)    Negative: [1] Harmful or possibly harmful chemical AND [2] unable to carry out irrigation (at home, work)    Negative: Shortness of breath    Negative: Cloudy spot or sore on the cornea (clear central part of eye)    Negative: [1] Blurred vision AND [2] persists > 1 hour since irrigation (regardless of duration of flushing)    Negative: [1] Eye pain AND [2] persists > 1 hour since irrigation (regardless of duration of flushing)    Negative: [1] Continued tearing or blinking AND [2] persists > 1 hour since irrigation  (regardless of duration of flushing)    Negative: Household bleach or ammonia    Negative: Laundry detergent POD    Negative: [1] Known chemical AND [2] not on the HARMLESS list     Negative: [1] Unknown chemical AND [2] NO eye symptoms (e.g., pain)    Negative: Redness persists > 24 hours    Protocols used: EYE - CHEMICAL IN-A-AH

## 2022-02-27 ENCOUNTER — HEALTH MAINTENANCE LETTER (OUTPATIENT)
Age: 77
End: 2022-02-27

## 2022-05-24 ENCOUNTER — DOCUMENTATION ONLY (OUTPATIENT)
Dept: SLEEP MEDICINE | Facility: CLINIC | Age: 77
End: 2022-05-24
Payer: COMMERCIAL

## 2022-05-24 DIAGNOSIS — R45.1 MOTOR RESTLESSNESS: Primary | ICD-10-CM

## 2022-05-24 DIAGNOSIS — F51.04 CHRONIC INSOMNIA: ICD-10-CM

## 2022-05-24 DIAGNOSIS — G47.33 OSA (OBSTRUCTIVE SLEEP APNEA): ICD-10-CM

## 2022-05-25 ENCOUNTER — DOCUMENTATION ONLY (OUTPATIENT)
Dept: SLEEP MEDICINE | Facility: CLINIC | Age: 77
End: 2022-05-25
Payer: COMMERCIAL

## 2022-05-25 DIAGNOSIS — F51.04 CHRONIC INSOMNIA: ICD-10-CM

## 2022-05-25 DIAGNOSIS — G47.33 OSA (OBSTRUCTIVE SLEEP APNEA): ICD-10-CM

## 2022-05-25 DIAGNOSIS — R45.1 MOTOR RESTLESSNESS: Primary | ICD-10-CM

## 2022-05-25 NOTE — PROGRESS NOTES
STM Recheck:    Patient call regarding [employer] recall for their pap device.    Device type:: Auto CPAP    Supplemental oxygen: No , if yes moved to advanced device workflow.     Current durable medical equipment provider: [facility]     Age of your current device:  less than 5 years old    History review:     Does the patient have the following?      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 No       Discontinuation of PAP therapy would lead to substantial deterioration of functional status or quality of life. No    If yes to any of the questions      Advised patient to continue using therapy until device is replaced or repaired.    Advised patient to avoid unapproved cleaning methods, such as ozone (see FDA safety communication on use of ozone ).    Has patient registered device? No Advised patient to register for repair or replacement on the [employer] website.  Patient can call [employer] at [phone] for additional support.    Bacterial filters to reduce exposure to particulates are sometimes cumbersome to use and are not currently recommended by the .If you choose to use a bacterial filter, consider discontinuing using humidity with the filter.     Does the patient feel they still need to have further discussion with provider ?   No     Please contact your DME to see if you are eligible to receive a new device if it is over 5 years old.  You may also choose to pay out of pocket for a new device, if your insurance does not cover a new device  at this time.      Patient has current complaints of None recommending patient visit with primary care provider regarding symptoms.  If primary care provider is not able to determine cause of complaint patient instructed to discontinue use of device.     If no to all questions:     Advised patient to discontinue use of the device.     Has patient registered device? Yes Advised patient to register for repair or replacement on the [employer] website.  Patient can call [employer] at [phone] for additional support.    Get another device that is not impacted by recall if possible. Please contact your DME to see if you are eligible to receive a new device if it is over 5 years old.     Discuss alternative treatments, including positional therapy, oral appliance therapy, and surgery.       Discussed behavioral strategies such as weight loss, exercise, and avoidance of alcohol and sedatives before bedtime.    Does the patient have additional questions or concerns to be sent to the provider at this time?  No    Plan :     Patient wishes to continue therapy. Recommended  to continue use of therapy until it is repaired or replaced.

## 2022-06-07 ENCOUNTER — OFFICE VISIT (OUTPATIENT)
Dept: FAMILY MEDICINE | Facility: CLINIC | Age: 77
End: 2022-06-07
Payer: COMMERCIAL

## 2022-06-07 VITALS
BODY MASS INDEX: 23.22 KG/M2 | TEMPERATURE: 98.7 F | SYSTOLIC BLOOD PRESSURE: 150 MMHG | HEART RATE: 74 BPM | OXYGEN SATURATION: 100 % | WEIGHT: 123 LBS | DIASTOLIC BLOOD PRESSURE: 74 MMHG | HEIGHT: 61 IN

## 2022-06-07 DIAGNOSIS — R09.81 CHRONIC NASAL CONGESTION: ICD-10-CM

## 2022-06-07 DIAGNOSIS — R51.9 NONINTRACTABLE EPISODIC HEADACHE, UNSPECIFIED HEADACHE TYPE: ICD-10-CM

## 2022-06-07 DIAGNOSIS — I25.119 CORONARY ARTERY DISEASE INVOLVING NATIVE CORONARY ARTERY OF NATIVE HEART WITH ANGINA PECTORIS (H): Chronic | ICD-10-CM

## 2022-06-07 DIAGNOSIS — I10 HYPERTENSION GOAL BP (BLOOD PRESSURE) < 140/90: Chronic | ICD-10-CM

## 2022-06-07 DIAGNOSIS — L71.9 ROSACEA: Primary | ICD-10-CM

## 2022-06-07 PROBLEM — Z82.49 FAMILY HISTORY OF ISCHEMIC HEART DISEASE: Status: RESOLVED | Noted: 2020-12-04 | Resolved: 2022-06-07

## 2022-06-07 PROBLEM — N18.31 STAGE 3A CHRONIC KIDNEY DISEASE (H): Chronic | Status: ACTIVE | Noted: 2019-08-15

## 2022-06-07 PROCEDURE — 99214 OFFICE O/P EST MOD 30 MIN: CPT | Performed by: FAMILY MEDICINE

## 2022-06-07 RX ORDER — LOSARTAN POTASSIUM 25 MG/1
25 TABLET ORAL DAILY
Qty: 90 TABLET | Refills: 2 | Status: SHIPPED | OUTPATIENT
Start: 2022-06-07 | End: 2022-07-11 | Stop reason: DRUGHIGH

## 2022-06-07 RX ORDER — METRONIDAZOLE 10 MG/G
GEL TOPICAL DAILY
Qty: 55 G | Refills: 11 | Status: SHIPPED | OUTPATIENT
Start: 2022-06-07 | End: 2022-07-11

## 2022-06-07 RX ORDER — METOPROLOL SUCCINATE 50 MG/1
75 TABLET, EXTENDED RELEASE ORAL DAILY
Qty: 135 TABLET | Refills: 0 | Status: SHIPPED | OUTPATIENT
Start: 2022-06-07 | End: 2022-09-12

## 2022-06-07 NOTE — PROGRESS NOTES
Assessment & Plan     (L71.9) Rosacea  (primary encounter diagnosis)  Comment: new diagnosis   Plan: metroNIDAZOLE (METROGEL) 1 % external gel        Discussed risks and benefits of this medication. Discussed etiology, course and treatment options, including offered dermatology referral.     (R09.81) Chronic nasal congestion  (R51.9) Nonintractable episodic headache, unspecified headache type  Comment: prior CT head reviewed with patient; no prior allergy testing   Plan: encouraged trial of flonase and/or claritin. Discussed risks and benefits of this medication. Call or return to clinic as needed if these symptoms worsen or fail to improve as anticipated for neurology and/or allergy referral.     (I10) Hypertension goal BP (blood pressure) < 140/90  Comment: uncontrolled   Plan: metoprolol succinate ER (TOPROL XL) 50 MG 24 hr        tablet, losartan (COZAAR) 25 MG tablet        Increase dose. Follow-up per AVS.     (I25.119) Coronary artery disease involving native coronary artery of native heart with angina pectoris (H)  Comment: prior statin intolerance  Plan: metoprolol succinate ER (TOPROL XL) 50 MG 24 hr        tablet, losartan (COZAAR) 25 MG tablet        Recommended that she resume daily ASA therapy              See Patient Instructions    Return in about 1 month (around 7/7/2022) for blood pressure.    Orly Ivy MD  [facility] JOSE Frank is a 76 year old who presents for the following health issues     History of Present Illness       Hypertension: She presents for follow up of hypertension.  She does check blood pressure  regularly outside of the clinic. Outside blood pressures have been over 140/90. She does not follow a low salt diet.         Rash  Onset/Duration: 1 year   Description  Location: face  Character: blotchy, raised, red  Itching: no  Intensity:  moderate  Progression of Symptoms:  worsening  Accompanying signs and symptoms:   Fever: no  Body aches  "or joint pain: YES- the back of her head on right side.  Sore throat symptoms: no  Recent cold symptoms: Sinus congestion all the time. Sometimes her teeth hurt  History:           Previous episodes of similar rash: Yes  New exposures:  None  Recent travel: no  Exposure to similar rash: YES  Precipitating or alleviating factors: Unsure if from chocolate. Stopped 3 weeks ago.  Therapies tried and outcome: none        Review of Systems   CONSTITUTIONAL: NEGATIVE for fever, chills, change in weight  INTEGUMENTARY/SKIN: chronic facial rash with pimples, easy bruising   EYES: NEGATIVE for vision changes or irritation  ENT/MOUTH: nasal congestion  RESP: NEGATIVE for significant cough or SOB  CV: NEGATIVE for chest pain, palpitations or peripheral edema  MUSCULOSKELETAL: NEGATIVE for significant arthralgias or myalgia  NEURO: Hx headaches-musculoskelatal  HEME/ALLERGY/IMMUNE: bruising when taking ASA   PSYCHIATRIC: anxiety      Objective    BP (!) 150/74 (BP Location: Right arm, Patient Position: Sitting, Cuff Size: Adult Regular)   Pulse 74   Temp 98.7  F (37.1  C) (Oral)   Ht 1.549 m (5' 1\")   Wt 55.8 kg (123 lb)   LMP  (LMP Unknown)   SpO2 100%   BMI 23.24 kg/m    Body mass index is 23.24 kg/m .  Physical Exam   GENERAL: healthy, alert and no distress  EYES: Eyes grossly normal to inspection, PERRL and conjunctivae and sclerae normal  HENT: ear canals and TM's normal, nose and mouth without ulcers or lesions  NECK: no adenopathy, no asymmetry, masses, or scars and thyroid normal to palpation  RESP: lungs clear to auscultation - no rales, rhonchi or wheezes  CV: regular rate and rhythm, normal S1 S2, no S3 or S4, no murmur, click or rub, no peripheral edema    MS: no gross musculoskeletal defects noted, no edema  NEURO: Normal strength and tone, mentation intact and speech normal  PSYCH: mentation appears normal, affect flat, anxious, judgement and insight intact and appearance well groomed    Office Visit on " 02/03/2022   Component Date Value Ref Range Status     Creatinine Urine mg/dL 02/03/2022 110  mg/dL Final     Albumin Urine mg/L 02/03/2022 24  mg/L Final     Albumin Urine mg/g Cr 02/03/2022 21.82  0.00 - 25.00 mg/g Cr Final     Sodium 02/03/2022 140  133 - 144 mmol/L Final     Potassium 02/03/2022 4.1  3.4 - 5.3 mmol/L Final     Chloride 02/03/2022 106  94 - 109 mmol/L Final     Carbon Dioxide (CO2) 02/03/2022 25  20 - 32 mmol/L Final     Anion Gap 02/03/2022 9  3 - 14 mmol/L Final     Urea Nitrogen 02/03/2022 23  7 - 30 mg/dL Final     Creatinine 02/03/2022 1.10 (A) 0.52 - 1.04 mg/dL Final     Calcium 02/03/2022 9.6  8.5 - 10.1 mg/dL Final     Glucose 02/03/2022 101 (A) 70 - 99 mg/dL Final     GFR Estimate 02/03/2022 51 (A) >60 mL/min/1.73m2 Final    Effective December 21, 2021 eGFRcr in adults is calculated using the 2021 CKD-EPI creatinine equation which includes age and gender (Jose Alfredo et al., [place], DOI: 10.1056/RQLRin4207317)     Cholesterol 02/03/2022 231 (A) <200 mg/dL Final     Triglycerides 02/03/2022 140  <150 mg/dL Final     Direct Measure HDL 02/03/2022 61  >=50 mg/dL Final     LDL Cholesterol Calculated 02/03/2022 142 (A) <=100 mg/dL Final     Non HDL Cholesterol 02/03/2022 170 (A) <130 mg/dL Final     Patient Fasting > 8hrs? 02/03/2022 No   Final     Hemoglobin 02/03/2022 13.5  11.7 - 15.7 g/dL Final     No results found for this or any previous visit (from the past 24 hour(s)).

## 2022-06-08 NOTE — PATIENT INSTRUCTIONS
Preventive Health Recommendations    Female Ages 65 +    Yearly exam:     See your health care provider every year in order to  o Review health changes.   o Discuss preventive care.    o Review your medicines if your doctor has prescribed any.      You no longer need a yearly Pap test unless you've had an abnormal Pap test in the past 10 years. If you have vaginal symptoms, such as bleeding or discharge, be sure to talk with your provider about a Pap test.      Every 1 to 2 years, have a mammogram.  If you are over 69, talk with your health care provider about whether or not you want to continue having screening mammograms.      Every 10 years, have a colonoscopy. Or, have a yearly FIT test (stool test). These exams will check for colon cancer.       Have a cholesterol test every 5 years, or more often if your doctor advises it.       Have a diabetes test (fasting glucose) every three years. If you are at risk for diabetes, you should have this test more often.       At age 65, have a bone density scan (DEXA) to check for osteoporosis (brittle bone disease).    Shots:    Get a flu shot each year.    Get a tetanus shot every 10 years.    Talk to your doctor about your pneumonia vaccines. There are now two you should receive - Pneumovax (PPSV 23) and Prevnar (PCV 13).    Talk to your doctor about the shingles vaccine.    Talk to your doctor about the hepatitis B vaccine.    Nutrition:     Eat at least 5 servings of fruits and vegetables each day.      Eat whole-grain bread, whole-wheat pasta and brown rice instead of white grains and rice.      Talk to your provider about Calcium and Vitamin D.     Lifestyle    Exercise at least 150 minutes a week (30 minutes a day, 5 days a week). This will help you control your weight and prevent disease.      Limit alcohol to one drink per day.      No smoking.       Wear sunscreen to prevent skin cancer.       See your dentist twice a year for an exam and cleaning.      See your  eye doctor every 1 to 2 years to screen for conditions such as glaucoma, macular degeneration and cataracts.  [facility]    If you have any questions regarding to your visit please contact your care team:       Team Purple:   Clinic Hours Telephone Number   Dr. Jade Nuñez   7am-7pm  Monday - Thursday   7am-5pm  Fridays  [phone]  (Appointment scheduling available 24/7)    Questions about your Visit?   Team Line:  [phone]   Urgent Care - [place] - 11am-9pm Monday-Friday Saturday-Sunday- 9am-5pm   [place] - 5pm-9pm Monday-Friday Saturday-Sunday- 9am-5pm  [phone] - [facility]  [phone] - [place]       What options do I have for visits at the clinic other than the traditional office visit?  To expand how we care for you, many of our providers are utilizing electronic visits (e-visits) and telephone visits, when medically appropriate, for interactions with their patients rather than a visit in the clinic.   We also offer nurse visits for many medical concerns. Just like any other service, we will bill your insurance company for this type of visit based on time spent on the phone with your provider. Not all insurance companies cover these visits. Please check with your medical insurance if this type of visit is covered. You will be responsible for any charges that are not paid by your insurance.      E-visits via Tr[employer]:  generally incur a $35.00 fee.  Telephone visits:  Time spent on the phone: *charged based on time that is spent on the phone in increments of 10 minutes. Estimated cost:   5-10 mins $30.00   11-20 mins. $59.00   21-30 mins. $85.00     Use RegistryLovet (secure email communication and access to your chart) to send your primary care provider a message or make an appointment. Ask someone on your Team how to sign up for Tr[employer].  For a Price Quote for your services, please call our  Consumer Birch Line at [phone].  As always, Thank you for trusting us with your health care needs!      [facility]    If you have any questions regarding to your visit please contact your care team:       Team Purple:   Clinic Hours Telephone Number   Dr. Jade Nuñez   7am-7pm  Monday - Thursday   7am-5pm  Fridays  [phone]  (Appointment scheduling available 24/7)    Questions about your Visit?   Team Line:  [phone]   [facility] [place] [facility] 11am-9pm Monday-Friday Saturday-Sunday- 9am-5pm   [place] - 5pm-9pm Monday-Friday Saturday-Sunday- 9am-5pm  [phone] - [place]   [phone] - [place]       What options do I have for visits at the clinic other than the traditional office visit?  To expand how we care for you, many of our providers are utilizing electronic visits (e-visits) and telephone visits, when medically appropriate, for interactions with their patients rather than a visit in the clinic.   We also offer nurse visits for many medical concerns. Just like any other service, we will bill your insurance company for this type of visit based on time spent on the phone with your provider. Not all insurance companies cover these visits. Please check with your medical insurance if this type of visit is covered. You will be responsible for any charges that are not paid by your insurance.      E-visits via Well[employer]:  generally incur a $35.00 fee.  Telephone visits:  Time spent on the phone: *charged based on time that is spent on the phone in increments of 10 minutes. Estimated cost:   5-10 mins $30.00   11-20 mins. $59.00   21-30 mins. $85.00     Use 1Mindt (secure email communication and access to your chart) to send your primary care provider a message or make an appointment. Ask someone on your Team how to sign up for Well[employer].  For a Price Quote for your services, please call our  Consumer Birch Line at [phone].  As always, Thank you for trusting us with your health care needs!           Detail Level: Zone Length Of Therapy: 3 years Latest Cbc (Optional): Due in Sept. 2022 Add High Risk Medication Management Associated Diagnosis?: No

## 2022-07-11 ENCOUNTER — OFFICE VISIT (OUTPATIENT)
Dept: FAMILY MEDICINE | Facility: CLINIC | Age: 77
End: 2022-07-11
Payer: COMMERCIAL

## 2022-07-11 VITALS
BODY MASS INDEX: 23.62 KG/M2 | DIASTOLIC BLOOD PRESSURE: 82 MMHG | HEART RATE: 58 BPM | SYSTOLIC BLOOD PRESSURE: 160 MMHG | OXYGEN SATURATION: 98 % | WEIGHT: 125 LBS | TEMPERATURE: 98.4 F

## 2022-07-11 DIAGNOSIS — H90.5 SENSORINEURAL HEARING LOSS, UNILATERAL: ICD-10-CM

## 2022-07-11 DIAGNOSIS — N18.31 STAGE 3A CHRONIC KIDNEY DISEASE (H): Chronic | ICD-10-CM

## 2022-07-11 DIAGNOSIS — H91.90 HEARING DIFFICULTY, UNSPECIFIED LATERALITY: ICD-10-CM

## 2022-07-11 DIAGNOSIS — L73.9 FOLLICULITIS: ICD-10-CM

## 2022-07-11 DIAGNOSIS — I10 HYPERTENSION GOAL BP (BLOOD PRESSURE) < 140/90: Primary | Chronic | ICD-10-CM

## 2022-07-11 PROCEDURE — 99214 OFFICE O/P EST MOD 30 MIN: CPT | Performed by: FAMILY MEDICINE

## 2022-07-11 RX ORDER — LOSARTAN POTASSIUM 50 MG/1
50 TABLET ORAL DAILY
Qty: 90 TABLET | Refills: 0 | Status: SHIPPED | OUTPATIENT
Start: 2022-07-11 | End: 2022-11-22

## 2022-07-11 RX ORDER — CETIRIZINE HYDROCHLORIDE 10 MG/1
10 TABLET ORAL PRN
COMMUNITY

## 2022-07-11 NOTE — PROGRESS NOTES
Assessment & Plan     (I10) Hypertension goal BP (blood pressure) < 140/90  (primary encounter diagnosis)  (N18.31) Stage 3a chronic kidney disease (H)  Comment: uncontrolled blood pressure   Plan: losartan (COZAAR) 50 MG tablet, Basic metabolic panel        Increase losartan dose from 25 to 50 mg daily.  Follow up in one week to one month for BMP and blood pressure check or sooner for worsening of symptoms or side effects.     (H90.5) Sensorineural hearing loss, unilateral  (H91.90) Hearing difficulty, unspecified laterality  Plan: follow-up with audiology     (L73.9) Folliculitis  Plan: Call or return to clinic as needed if these symptoms worsen or fail to improve as anticipated.              See Patient Instructions    Return in about 1 month (around 8/11/2022) for blood pressure.    Orly Ivy MD  [facility] JOSE Frank is a 76 year old, presenting for the following health issues:  Hypertension      HPI     Hypertension Follow-up      Do you check your blood pressure regularly outside of the clinic? Yes     Are you following a low salt diet? No    Are your blood pressures ever more than 140 on the top number (systolic) OR more   than 90 on the bottom number (diastolic), for example 140/90? Yes      How many servings of fruits and vegetables do you eat daily?  2-3    On average, how many sweetened beverages do you drink each day (Examples: soda, juice, sweet tea, etc.  Do NOT count diet or artificially sweetened beverages)?   0    How many days per week do you exercise enough to make your heart beat faster? 7    How many minutes a day do you exercise enough to make your heart beat faster? 20 - 29    How many days per week do you miss taking your medication? 0        Review of Systems   CONSTITUTIONAL: NEGATIVE for fever, chills, change in weight  INTEGUMENTARY/SKIN: skin lesions on right chest, left abdomen; facial rash   ENT/MOUTH: hearing loss  RESP: NEGATIVE for  significant cough or SOB  CV: NEGATIVE for chest pain, palpitations or peripheral edema      Objective    BP (!) 170/90 (BP Location: Left arm, Patient Position: Sitting, Cuff Size: Adult Regular)   Pulse 58   Temp 98.4  F (36.9  C) (Oral)   Wt 56.7 kg (125 lb)   LMP  (LMP Unknown)   SpO2 98%   BMI 23.62 kg/m    Body mass index is 23.62 kg/m .  Physical Exam   GENERAL: healthy, alert and no distress  EYES: Eyes grossly normal to inspection, PERRL and conjunctivae and sclerae normal  HENT: both ears: normal: no effusions, no erythema, normal landmarks and small amount of wax removed by curette bilaterally   NECK: no adenopathy, no asymmetry, masses, or scars and thyroid normal to palpation  RESP: lungs clear to auscultation - no rales, rhonchi or wheezes  CV: regular rate and rhythm, normal S1 S2, no S3 or S4, no murmur, click or rub, no peripheral edema    MS: no gross musculoskeletal defects noted, no edema  SKIN: small pustule on right breast; small scale on left abdomen looks to be a healing folliculitis papule; minute white papule on left upper back appears to be a small scale of dry skin; mild facial erythema   PSYCH: mentation appears normal, affect normal/bright    Office Visit on 02/03/2022   Component Date Value Ref Range Status     Creatinine Urine mg/dL 02/03/2022 110  mg/dL Final     Albumin Urine mg/L 02/03/2022 24  mg/L Final     Albumin Urine mg/g Cr 02/03/2022 21.82  0.00 - 25.00 mg/g Cr Final     Sodium 02/03/2022 140  133 - 144 mmol/L Final     Potassium 02/03/2022 4.1  3.4 - 5.3 mmol/L Final     Chloride 02/03/2022 106  94 - 109 mmol/L Final     Carbon Dioxide (CO2) 02/03/2022 25  20 - 32 mmol/L Final     Anion Gap 02/03/2022 9  3 - 14 mmol/L Final     Urea Nitrogen 02/03/2022 23  7 - 30 mg/dL Final     Creatinine 02/03/2022 1.10 (A) 0.52 - 1.04 mg/dL Final     Calcium 02/03/2022 9.6  8.5 - 10.1 mg/dL Final     Glucose 02/03/2022 101 (A) 70 - 99 mg/dL Final     GFR Estimate 02/03/2022 51 (A)  >60 mL/min/1.73m2 Final    Effective December 21, 2021 eGFRcr in adults is calculated using the 2021 CKD-EPI creatinine equation which includes age and gender (Jose Alfredo et al., [place], DOI: 10.1056/IDSInr0662551)     Cholesterol 02/03/2022 231 (A) <200 mg/dL Final     Triglycerides 02/03/2022 140  <150 mg/dL Final     Direct Measure HDL 02/03/2022 61  >=50 mg/dL Final     LDL Cholesterol Calculated 02/03/2022 142 (A) <=100 mg/dL Final     Non HDL Cholesterol 02/03/2022 170 (A) <130 mg/dL Final     Patient Fasting > 8hrs? 02/03/2022 No   Final     Hemoglobin 02/03/2022 13.5  11.7 - 15.7 g/dL Final     No results found for this or any previous visit (from the past 24 hour(s)).                .  ..

## 2022-07-11 NOTE — PATIENT INSTRUCTIONS
Get the shingles vaccine, called Shingrix (given as 2 shots, 2 to 6 months apart), even if you have already had the Zostavax vaccine. Discuss getting the Shingix vaccine from your pharmacist, or schedule an ancillary shot visit here. Some insurances do not cover the cost of these vaccines.

## 2022-07-25 ENCOUNTER — ALLIED HEALTH/NURSE VISIT (OUTPATIENT)
Dept: FAMILY MEDICINE | Facility: CLINIC | Age: 77
End: 2022-07-25
Payer: COMMERCIAL

## 2022-07-25 VITALS — SYSTOLIC BLOOD PRESSURE: 154 MMHG | DIASTOLIC BLOOD PRESSURE: 80 MMHG

## 2022-07-25 DIAGNOSIS — Z01.30 BP CHECK: Primary | ICD-10-CM

## 2022-07-25 PROCEDURE — 99207 PR NO CHARGE NURSE ONLY: CPT | Performed by: FAMILY MEDICINE

## 2022-07-25 NOTE — PROGRESS NOTES
Monika Serrano was evaluated at [facility] on July 25, 2022 at which time her blood pressure was:    BP Readings from Last 3 Encounters:   07/25/22 (!) 154/80   07/11/22 (!) 160/82   06/07/22 (!) 150/74     Pulse Readings from Last 3 Encounters:   07/11/22 58   06/07/22 74   10/25/21 57       Reviewed lifestyle modifications for blood pressure control and reduction: including making healthy food choices, managing weight, getting regular exercise, smoking cessation, reducing alcohol consumption, monitoring blood pressure regularly.     Symptoms: None    BP Goal:< 140/90 mmHg    BP Assessment:  BP too high    Potential Reasons for BP too high: Dose of BP medication too low    BP Follow-Up Plan: Recheck BP in 30 days at pharmacy    Recommendation to Provider: Recommended patient come back for BP check in 1-2 weeks to keep track of BP more often to see how BP med dose increases are working. She states her home BP readings using an automated machine vary greatly and that she has had the machine checked in the past. Patient stated she has had no concerns with her recent medication dose increases. She did state that every once in a while she can feel her heart beat a little harder but wasn't concerned.     Note completed by:   Rimma Juárez, Pharm.D.  Do Pharmacist, [facility]   [facility]

## 2022-08-31 ENCOUNTER — ALLIED HEALTH/NURSE VISIT (OUTPATIENT)
Dept: FAMILY MEDICINE | Facility: CLINIC | Age: 77
End: 2022-08-31
Payer: COMMERCIAL

## 2022-08-31 VITALS — DIASTOLIC BLOOD PRESSURE: 80 MMHG | SYSTOLIC BLOOD PRESSURE: 138 MMHG

## 2022-08-31 DIAGNOSIS — Z01.30 BP CHECK: Primary | ICD-10-CM

## 2022-08-31 PROCEDURE — 99207 PR NO CHARGE NURSE ONLY: CPT | Performed by: FAMILY MEDICINE

## 2022-09-12 DIAGNOSIS — I25.119 CORONARY ARTERY DISEASE INVOLVING NATIVE CORONARY ARTERY OF NATIVE HEART WITH ANGINA PECTORIS (H): Chronic | ICD-10-CM

## 2022-09-12 DIAGNOSIS — I10 HYPERTENSION GOAL BP (BLOOD PRESSURE) < 140/90: Chronic | ICD-10-CM

## 2022-09-12 RX ORDER — METOPROLOL SUCCINATE 50 MG/1
TABLET, EXTENDED RELEASE ORAL
Qty: 135 TABLET | Refills: 0 | Status: SHIPPED | OUTPATIENT
Start: 2022-09-12 | End: 2022-11-22

## 2022-09-22 DIAGNOSIS — I10 ESSENTIAL HYPERTENSION WITH GOAL BLOOD PRESSURE LESS THAN 140/90: ICD-10-CM

## 2022-09-22 DIAGNOSIS — I25.119 CORONARY ARTERY DISEASE INVOLVING NATIVE CORONARY ARTERY OF NATIVE HEART WITH ANGINA PECTORIS (H): Chronic | ICD-10-CM

## 2022-09-23 RX ORDER — LOSARTAN POTASSIUM 25 MG/1
TABLET ORAL
Qty: 90 TABLET | Refills: 0 | OUTPATIENT
Start: 2022-09-23

## 2022-11-19 ENCOUNTER — HEALTH MAINTENANCE LETTER (OUTPATIENT)
Age: 77
End: 2022-11-19

## 2022-11-22 DIAGNOSIS — I25.119 CORONARY ARTERY DISEASE INVOLVING NATIVE CORONARY ARTERY OF NATIVE HEART WITH ANGINA PECTORIS (H): Chronic | ICD-10-CM

## 2022-11-22 DIAGNOSIS — N18.31 STAGE 3A CHRONIC KIDNEY DISEASE (H): Chronic | ICD-10-CM

## 2022-11-22 DIAGNOSIS — I10 HYPERTENSION GOAL BP (BLOOD PRESSURE) < 140/90: Chronic | ICD-10-CM

## 2022-11-22 RX ORDER — METOPROLOL SUCCINATE 50 MG/1
TABLET, EXTENDED RELEASE ORAL
Qty: 135 TABLET | Refills: 0 | Status: SHIPPED | OUTPATIENT
Start: 2022-11-22 | End: 2023-02-02

## 2022-11-22 RX ORDER — LOSARTAN POTASSIUM 50 MG/1
TABLET ORAL
Qty: 90 TABLET | Refills: 0 | Status: SHIPPED | OUTPATIENT
Start: 2022-11-22 | End: 2022-12-20

## 2022-12-07 ENCOUNTER — ALLIED HEALTH/NURSE VISIT (OUTPATIENT)
Dept: FAMILY MEDICINE | Facility: CLINIC | Age: 77
End: 2022-12-07
Payer: COMMERCIAL

## 2022-12-07 VITALS — SYSTOLIC BLOOD PRESSURE: 128 MMHG | DIASTOLIC BLOOD PRESSURE: 78 MMHG

## 2022-12-07 DIAGNOSIS — Z01.30 BP CHECK: Primary | ICD-10-CM

## 2022-12-07 PROCEDURE — 99207 PR NO CHARGE NURSE ONLY: CPT | Performed by: INTERNAL MEDICINE

## 2022-12-07 NOTE — PROGRESS NOTES
Monika Serrano was evaluated at [facility] on December 7, 2022 at which time her blood pressure was:    BP Readings from Last 3 Encounters:   12/07/22 128/78   08/31/22 138/80   07/25/22 (!) 154/80     Pulse Readings from Last 3 Encounters:   07/11/22 58   06/07/22 74   10/25/21 57       Reviewed lifestyle modifications for blood pressure control and reduction: including making healthy food choices, managing weight, getting regular exercise, smoking cessation, reducing alcohol consumption, monitoring blood pressure regularly.     Symptoms: None    BP Goal:< 140/90 mmHg    BP Assessment:  BP at goal    Potential Reasons for BP too high: NA - Not applicable    BP Follow-Up Plan: Recheck BP in 6 months at pharmacy    Recommendation to Provider: none    Note completed by: Andrey Lentz Rph

## 2022-12-12 ENCOUNTER — TELEPHONE (OUTPATIENT)
Dept: FAMILY MEDICINE | Facility: CLINIC | Age: 77
End: 2022-12-12

## 2022-12-12 NOTE — TELEPHONE ENCOUNTER
Pt calling stating she cannot pick her losartan (COZAAR) 50 MG tablet.    RN called pharmacy to understand what they need.     RN sat on hold with pharmacy for over 10 min.     RN called pt back to let her know I am unable to wait for her pharmacy any longer. Rn advised through  for pt to call pharmacy and ask them to contact clinic with what they need.     RN relayed to pt there was an RX sent and pharmacy confirmed receit on 11/22/22    Yasmin Richard RN

## 2022-12-20 ENCOUNTER — TELEPHONE (OUTPATIENT)
Dept: FAMILY MEDICINE | Facility: CLINIC | Age: 77
End: 2022-12-20

## 2022-12-20 DIAGNOSIS — I10 HYPERTENSION GOAL BP (BLOOD PRESSURE) < 140/90: Chronic | ICD-10-CM

## 2022-12-20 DIAGNOSIS — N18.31 STAGE 3A CHRONIC KIDNEY DISEASE (H): Chronic | ICD-10-CM

## 2022-12-20 RX ORDER — LOSARTAN POTASSIUM 50 MG/1
50 TABLET ORAL DAILY
Qty: 90 TABLET | Refills: 0 | Status: SHIPPED | OUTPATIENT
Start: 2022-12-20 | End: 2023-02-24

## 2022-12-20 NOTE — TELEPHONE ENCOUNTER
Patient calling stating she has not received her rx for losartan for the 11/22/22 refill.     Writer called pharmacy as it states receipt was confirmed by pharmacy on 11/22/22    Pharmacy tech stated they do not have it on file.     Writer resent a new script to the pharmacy; confirmed with pharmacy tech that they received it.     Writer updated patient.    Irasema Patino RN  [facility]

## 2023-01-16 ENCOUNTER — NURSE TRIAGE (OUTPATIENT)
Dept: FAMILY MEDICINE | Facility: CLINIC | Age: 78
End: 2023-01-16

## 2023-01-16 NOTE — TELEPHONE ENCOUNTER
Reason for Call:  Appointment Request    Patient requesting this type of appt:  Office visit    Requested provider: Missael Fair    Reason patient unable to be scheduled: Not within requested timeframe    When does patient want to be seen/preferred time: Same day    Comments: Patient called and would like to see her primary today to check her heart and BP per pt no chest pain but something is going on     Could we send this information to you in [facility] or would you prefer to receive a phone call?:   Patient would prefer a phone call   Okay to leave a detailed message?: Yes at Home number on file [phone] (home)    Call taken on 1/16/2023 at 7:39 AM by Shae Zarate

## 2023-01-17 ENCOUNTER — OFFICE VISIT (OUTPATIENT)
Dept: FAMILY MEDICINE | Facility: CLINIC | Age: 78
End: 2023-01-17
Payer: COMMERCIAL

## 2023-01-17 VITALS
OXYGEN SATURATION: 96 % | DIASTOLIC BLOOD PRESSURE: 91 MMHG | HEART RATE: 59 BPM | TEMPERATURE: 97.4 F | SYSTOLIC BLOOD PRESSURE: 158 MMHG | HEIGHT: 61 IN | RESPIRATION RATE: 16 BRPM | BODY MASS INDEX: 24.28 KG/M2 | WEIGHT: 128.6 LBS

## 2023-01-17 DIAGNOSIS — I10 HYPERTENSION GOAL BP (BLOOD PRESSURE) < 140/90: Primary | Chronic | ICD-10-CM

## 2023-01-17 DIAGNOSIS — I25.119 CORONARY ARTERY DISEASE INVOLVING NATIVE CORONARY ARTERY OF NATIVE HEART WITH ANGINA PECTORIS (H): ICD-10-CM

## 2023-01-17 PROCEDURE — 93000 ELECTROCARDIOGRAM COMPLETE: CPT | Performed by: PHYSICIAN ASSISTANT

## 2023-01-17 PROCEDURE — 99214 OFFICE O/P EST MOD 30 MIN: CPT | Performed by: PHYSICIAN ASSISTANT

## 2023-01-17 ASSESSMENT — PAIN SCALES - GENERAL: PAINLEVEL: NO PAIN (0)

## 2023-01-17 NOTE — TELEPHONE ENCOUNTER
We need more information. Are there urgent needs here ? How far out am I booking ? In order to make special arrangements we need more information. I can probably do a visit within 1-2 weeks but need more information     See what you can find out and lets get her scheduled     Missael Fair MD

## 2023-01-17 NOTE — PATIENT INSTRUCTIONS
Check blood pressure 2 times a day for the next `1 week.     Morning blood pressure should be taken 2 hours or more after your morning meds.     Then send Isis or Dr. Fair our readings.       Password january

## 2023-01-17 NOTE — PROGRESS NOTES
Assessment & Plan     Coronary artery disease involving native coronary artery of native heart with angina pectoris (H)  EKG reassuring. Will monitor symptoms at this time.   - EKG 12-lead complete w/read - Clinics    Hypertension goal BP (blood pressure) < 140/90  Start checking blood pressure at home. May likely need to modify meds as blood pressure has been running high.                    No follow-ups on file.    LILIANA Cohen [facility] JOSE Frank is a 77 year old, presenting for the following health issues:  Hypertension (Check bp and leg pain )      HPI     Check bp and heart   Pain History:  When did you first notice your pain? - Acute Pain   Have you seen anyone else for your pain? Yes - Acupuncture   Where in your body do you have pain? Right leg and sometimes left leg       Patient notes that she can feel like her heart can skip beats   Has a lot of pressure in her mid chest.     Pain with pressing on the incision.   Can have pain when not pressing on the chest though.     Has had heartburn the last few months, has not had this before.   Has been taking tums.     Review of Systems         Objective    BP (!) 158/91 (BP Location: Right arm, Patient Position: Sitting, Cuff Size: Adult Regular)   Pulse 59   Temp 97.4  F (36.3  C) (Oral)   Wt 58.3 kg (128 lb 9.6 oz)   LMP  (LMP Unknown)   BMI 24.30 kg/m    Body mass index is 24.3 kg/m .  Physical Exam   GENERAL: healthy, alert and no distress  NECK: no adenopathy, no asymmetry, masses, or scars and thyroid normal to palpation  RESP: lungs clear to auscultation - no rales, rhonchi or wheezes  CV: regular rate and rhythm, normal S1 S2, no S3 or S4, no murmur, click or rub, no peripheral edema   ABDOMEN: soft, nontender, no hepatosplenomegaly, no masses and bowel sounds normal  PSYCH: mentation appears normal, affect normal/bright    EKG - Reviewed and interpreted by me appears normal, NSR, normal axis, normal  intervals, no acute ST/T changes c/w ischemia, no LVH by voltage criteria, Premature Atrial Contractions (PAC) noted,

## 2023-01-17 NOTE — TELEPHONE ENCOUNTER
Spoke with patient regarding symptoms. Patient reported that her blood pressure was measured at pharmacy on 1/16 with result of 196/88 with symptom of chest pressure. Patient states she was unsure if the chest pressure was from history of open heart surgery. When patient presses on the center of her chest, it changes the feeling of it. Patient feels like she also has some irregular heart beats. Patient occasionally feels pain on the back of her head during the evening as well.    Patient no longer experiencing chest pressure, and her latest blood pressure was taken at pharmacy measuring at 157/81. Patient is on losartan and metoprolol. Patient usually gets blood pressures taken at pharmacy. Patient states she did not go to ED with high blood pressure reading as she was told in the past not to go to the ED for elevated blood pressures. Advised patient if she has elevated blood pressures as she did on 1/13 with cardiac symptoms, that per protocol she should go to the ED for future occurrences. Patient verbalized understanding.    Patient already has appointment scheduled with provider for today at 12:30 pm to discuss blood pressures and symptoms.    KEYA Solano RN  [facility]    Reason for Disposition    Patient wants to be seen    Additional Information    Negative: Sounds like a life-threatening emergency to the triager    Negative: Symptom is main concern (e.g., headache, chest pain)    Negative: Low blood pressure is main concern    Negative: Systolic BP >= 160 OR Diastolic >= 100, and any cardiac or neurologic symptoms (e.g., chest pain, difficulty breathing, unsteady gait, blurred vision)    Negative: Pregnant 20 or more weeks (or postpartum < 6 weeks) with new hand or face swelling    Negative: Pregnant 20 or more weeks (or postpartum < 6 weeks) and Systolic BP >= 160 OR Diastolic >= 100    Negative: Patient sounds very sick or weak to the triager    Negative: Systolic BP >= 200 OR  Diastolic >= 120 and having NO cardiac or neurologic symptoms    Negative: Pregnant 20 or more weeks (or postpartum < 6 weeks) with Systolic BP >= 140 OR Diastolic >= 90    Negative: Systolic BP >= 180 OR Diastolic >= 110, and missed most recent dose of blood pressure medication    Negative: Systolic BP >= 180 OR Diastolic >= 110    Protocols used: BLOOD PRESSURE - HIGH-A-OH

## 2023-01-30 ENCOUNTER — NURSE TRIAGE (OUTPATIENT)
Dept: FAMILY MEDICINE | Facility: CLINIC | Age: 78
End: 2023-01-30
Payer: COMMERCIAL

## 2023-01-30 NOTE — TELEPHONE ENCOUNTER
Called patient. Left voice message to return call at [phone].    Shari Ratliff RN   [facility]

## 2023-01-30 NOTE — TELEPHONE ENCOUNTER
I am not at work tomorrow , it is one of my regular days off    Lets get her in either Thursday or Friday of this week    Please huddle with me on this case     Missael Fair MD

## 2023-01-30 NOTE — TELEPHONE ENCOUNTER
Pt calling because she went to the dentist this AM to get a tooth removed and they would not do it once they took her BP. She does not know what the top number was, but the bottom number was 106. Pt went home and checked BP and it is currently 125/69. Pt states that she is not having any symptoms. She walks 2 miles a day and exercises and still does not have any symptoms of high blood pressure.     Pt's BP on Saturday was 199/82 and 192/83. Sunday was 157/94 and 164/83. Pt would like to be seen in clinic tomorrow to talk with her primary regarding adjusting her medications. Pt was seen on 01/17/23 for elevated BP's.     Erin Moore RN  [facility]

## 2023-01-31 NOTE — TELEPHONE ENCOUNTER
Patient called.  Appointment scheduled for Thursday, February 2nd at 11:40 a.m.    Esthela Belcher,

## 2023-02-02 ENCOUNTER — OFFICE VISIT (OUTPATIENT)
Dept: INTERNAL MEDICINE | Facility: CLINIC | Age: 78
End: 2023-02-02
Payer: COMMERCIAL

## 2023-02-02 VITALS
TEMPERATURE: 97.3 F | SYSTOLIC BLOOD PRESSURE: 160 MMHG | WEIGHT: 125 LBS | HEIGHT: 61 IN | HEART RATE: 72 BPM | OXYGEN SATURATION: 96 % | BODY MASS INDEX: 23.6 KG/M2 | DIASTOLIC BLOOD PRESSURE: 70 MMHG

## 2023-02-02 DIAGNOSIS — Z23 NEED FOR PROPHYLACTIC VACCINATION AGAINST STREPTOCOCCUS PNEUMONIAE (PNEUMOCOCCUS): ICD-10-CM

## 2023-02-02 DIAGNOSIS — Z95.2 S/P AORTIC VALVE REPLACEMENT: Chronic | ICD-10-CM

## 2023-02-02 DIAGNOSIS — N18.31 STAGE 3A CHRONIC KIDNEY DISEASE (H): ICD-10-CM

## 2023-02-02 DIAGNOSIS — Z79.2 PROPHYLACTIC ANTIBIOTIC: ICD-10-CM

## 2023-02-02 DIAGNOSIS — I10 HYPERTENSION GOAL BP (BLOOD PRESSURE) < 140/90: Primary | Chronic | ICD-10-CM

## 2023-02-02 DIAGNOSIS — H34.8312 STABLE BRANCH RETINAL VEIN OCCLUSION OF RIGHT EYE (H): ICD-10-CM

## 2023-02-02 DIAGNOSIS — I25.10 NONOBSTRUCTIVE ATHEROSCLEROSIS OF CORONARY ARTERY: ICD-10-CM

## 2023-02-02 DIAGNOSIS — R07.9 CHEST PAIN, UNSPECIFIED TYPE: ICD-10-CM

## 2023-02-02 DIAGNOSIS — E78.5 HYPERLIPIDEMIA LDL GOAL <70: ICD-10-CM

## 2023-02-02 LAB — HGB BLD-MCNC: 13.7 G/DL (ref 11.7–15.7)

## 2023-02-02 PROCEDURE — 99214 OFFICE O/P EST MOD 30 MIN: CPT | Performed by: INTERNAL MEDICINE

## 2023-02-02 PROCEDURE — 80061 LIPID PANEL: CPT | Performed by: INTERNAL MEDICINE

## 2023-02-02 PROCEDURE — 85018 HEMOGLOBIN: CPT | Performed by: INTERNAL MEDICINE

## 2023-02-02 PROCEDURE — 80048 BASIC METABOLIC PNL TOTAL CA: CPT | Performed by: INTERNAL MEDICINE

## 2023-02-02 PROCEDURE — 36415 COLL VENOUS BLD VENIPUNCTURE: CPT | Performed by: INTERNAL MEDICINE

## 2023-02-02 RX ORDER — AMOXICILLIN 500 MG/1
500 CAPSULE ORAL ONCE
Qty: 4 CAPSULE | Refills: 0 | Status: SHIPPED | OUTPATIENT
Start: 2023-02-02 | End: 2023-02-02

## 2023-02-02 RX ORDER — LOSARTAN POTASSIUM 50 MG/1
50 TABLET ORAL DAILY
Qty: 90 TABLET | Status: CANCELLED | OUTPATIENT
Start: 2023-02-02

## 2023-02-02 RX ORDER — ROSUVASTATIN CALCIUM 10 MG/1
10 TABLET, COATED ORAL
Qty: 24 TABLET | Refills: 1 | Status: SHIPPED | OUTPATIENT
Start: 2023-02-02 | End: 2024-02-23

## 2023-02-02 RX ORDER — METOPROLOL SUCCINATE 50 MG/1
75 TABLET, EXTENDED RELEASE ORAL DAILY
Qty: 135 TABLET | Refills: 1 | Status: SHIPPED | OUTPATIENT
Start: 2023-02-02 | End: 2023-09-18

## 2023-02-02 RX ORDER — LOSARTAN POTASSIUM 100 MG/1
100 TABLET ORAL DAILY
Qty: 90 TABLET | Refills: 1 | Status: SHIPPED | OUTPATIENT
Start: 2023-02-02 | End: 2023-02-24

## 2023-02-02 NOTE — PROGRESS NOTES
Assessment & Plan     Hypertension goal BP (blood pressure) < 140/90  Seeing Monika JORY Serrano in further follow up regarding her somewhat sub-optimally controlled hypertension. Clearly in certain situations she has demonstrated the abilities to generate a blood pressure as high as 190's over 100's. These are all entirely asymptomatic blood pressure spikes . We reviewed in detail the problem with hypertension management , we often run into biological variability and we try to treat more of the mean blood pressure and less major reliance on the importance of individual numbers. I agree with adding an increase of the losartan ( Cozaar ) dose from 50 to 100 milligrams but leave metoprolol the same due to adequate beta blockade. Recommended some follow up MyChart messages or telephone call to clinic with updates on home blood pressure monitoring , with additional medication adjustments possibly necessary   - REVIEW OF HEALTH MAINTENANCE PROTOCOL ORDERS  - metoprolol succinate ER (TOPROL XL) 50 MG 24 hr tablet; Take 1.5 tablets (75 mg) by mouth daily  - losartan (COZAAR) 100 MG tablet; Take 1 tablet (100 mg) by mouth daily  - Basic metabolic panel  (Ca, Cl, CO2, Creat, Gluc, K, Na, BUN); Future  - Basic metabolic panel    Nonobstructive atherosclerosis of coronary artery  She had a coronary angiogram that revealed non-obstructing coronary artery disease and no stents or coronary interventions necessary. She is free of anginal symptoms at this time ./ asymptomatic . Main ficus is maximum medical therapy and she's not on lipid lowering therapy with statin medication this led to a long discussion . Ultimately she understands and is in agreement with starting Crestor [ rosuvastatin ] but at 2 times per week only  - Lipid panel reflex to direct LDL Non-fasting; Future  - metoprolol succinate ER (TOPROL XL) 50 MG 24 hr tablet; Take 1.5 tablets (75 mg) by mouth daily  - Lipid panel reflex to direct LDL Non-fasting    Stage 3a  chronic kidney disease (H)  Recheck today , problem is stable and ongoing monitoring    - Hemoglobin; Future  - losartan (COZAAR) 100 MG tablet; Take 1 tablet (100 mg) by mouth daily  - Basic metabolic panel  - Hemoglobin    Stable branch retinal vein occlusion of right eye  Problem is stable and ongoing monitoring      S/P aortic valve replacement  Problem is stable and ongoing monitoring  , takes prophylactic antibiotics     Chest pain, unspecified type  We discussed some sternal pain she's had ever since open heart surgery for her aortic valve replacement. This is definitely musculoskeletal as reproducible pain along the sternum is easily demonstrated on physical exam today . I suggested a follow up with the cardiovascular surgeon and she doesn't think this is enough of a problem to warrant this. We discussed what to be on the watch for  , update me if significant changes have taken place     Need for prophylactic vaccination against Streptococcus pneumoniae (pneumococcus)  Agreed but unfortunately was not  Given and can be done at a later date    Hyperlipidemia LDL goal <40    - REVIEW OF HEALTH MAINTENANCE PROTOCOL ORDERS  - rosuvastatin (CRESTOR) 10 MG tablet; Take 1 tablet (10 mg) by mouth twice a week    Prophylactic antibiotic  As detailed above , see letter to dentist  - amoxicillin (AMOXIL) 500 MG capsule; Take 1 capsule (500 mg) by mouth once for 1 dose    Acne rosacea - towards the end of the ov she showed me a facial skin lesion, down on the right sided nasolabial fold area. This is a reddened quality acneiform skin lesion and has no worrisome features for skin cancer . We discussed what to be on the watch for  . Update me if significant changes have taken place . Can refer to Dermatologist but I don't think this is necessary right now.     Review of the result(s) of each unique test - todays tests  Prescription drug management  40 minutes spent on the date of the encounter doing chart review, history  and exam, documentation and further activities per the note         Return in about 3 months (around 5/2/2023).    Missael Fair MD  [facility] JOSE Frank is a 77 year old accompanied by her self, presenting for the following health issues:  Hypertension      History of Present Illness       Hypertension: She presents for follow up of hypertension.  She does check blood pressure  regularly outside of the clinic. Outside blood pressures have been over 140/90. She follows a low salt diet.     Reason for visit:  Hi blood pressure  Symptom onset:  More than a month  Symptoms include:  Bood pressure ia all over the place and often hi  Symptom intensity:  Moderate  Symptom progression:  Staying the same  Had these symptoms before:  Yes  Has tried/received treatment for these symptoms:  Yes  Previous treatment was successful:  No  What makes it worse:  Anxiety  What makes it better:  Meditation   qigong  deep breathing    She eats 2-3 servings of fruits and vegetables daily.She consumes 0 sweetened beverage(s) daily.She exercises with enough effort to increase her heart rate 30 to 60 minutes per day.  She exercises with enough effort to increase her heart rate 6 days per week.   She is taking medications regularly.     having concerns  with improperly controlled hypertension   Rapid heart action, just  When she puts her arms up, seems to be associated with anxiety   When she puts her hands down her rapid heart action  Subsides, probably has been going on for years , she's not so sure anything is different   Works with Chinese medicine practitioner and follows this provider roughly 3 times per month ever since her aortic valve replacement in 2016  No pacemaker   No history of atrial fibrillation   A few weeks ago she was seen for these symptoms , also with blood pressure reading   She brought a set of home blood pressure monitoring   She has a sporadic feeling of jumped / skipped extra  "heart beat sensations  Still has some sternotomy incision pain, despite a well healed incision. And this is the sound of tenderness to palpation to the sternum    Positive family history of coronary artery disease with 3 brothers        Review of Systems   Constitutional, HEENT, cardiovascular, pulmonary, gi and gu systems are negative, except as otherwise noted.      Objective    BP (!) 160/70   Pulse 72   Temp 97.3  F (36.3  C) (Oral)   Ht 1.549 m (5' 0.98\")   Wt 56.7 kg (125 lb)   LMP  (LMP Unknown)   SpO2 96%   BMI 23.63 kg/m    Body mass index is 23.63 kg/m .  Physical Exam   GENERAL: healthy, alert and no distress  EYES: Eyes grossly normal to inspection, PERRL and conjunctivae and sclerae normal  RESP: lungs clear to auscultation - no rales, rhonchi or wheezes  CV: regular rate and rhythm, normal S1 S2, no S3 or S4, no murmur, click or rub, no peripheral edema and peripheral pulses strong  MS: no gross musculoskeletal defects noted, no edema  NEURO: Normal strength and tone, mentation intact and speech normal  PSYCH: mentation appears normal, affect normal/bright    Orders Placed This Encounter   Procedures     REVIEW OF HEALTH MAINTENANCE PROTOCOL ORDERS     Lipid panel reflex to direct LDL Non-fasting     Hemoglobin     Basic metabolic panel  (Ca, Cl, CO2, Creat, Gluc, K, Na, BUN)         "

## 2023-02-03 LAB
ANION GAP SERPL CALCULATED.3IONS-SCNC: 3 MMOL/L (ref 3–14)
BUN SERPL-MCNC: 28 MG/DL (ref 7–30)
CALCIUM SERPL-MCNC: 9.7 MG/DL (ref 8.5–10.1)
CHLORIDE BLD-SCNC: 108 MMOL/L (ref 94–109)
CHOLEST SERPL-MCNC: 232 MG/DL
CO2 SERPL-SCNC: 28 MMOL/L (ref 20–32)
CREAT SERPL-MCNC: 1.05 MG/DL (ref 0.52–1.04)
FASTING STATUS PATIENT QL REPORTED: ABNORMAL
GFR SERPL CREATININE-BSD FRML MDRD: 54 ML/MIN/1.73M2
GLUCOSE BLD-MCNC: 113 MG/DL (ref 70–99)
HDLC SERPL-MCNC: 62 MG/DL
LDLC SERPL CALC-MCNC: 155 MG/DL
NONHDLC SERPL-MCNC: 170 MG/DL
POTASSIUM BLD-SCNC: 4.2 MMOL/L (ref 3.4–5.3)
SODIUM SERPL-SCNC: 139 MMOL/L (ref 133–144)
TRIGL SERPL-MCNC: 75 MG/DL

## 2023-02-15 ENCOUNTER — TELEPHONE (OUTPATIENT)
Dept: FAMILY MEDICINE | Facility: CLINIC | Age: 78
End: 2023-02-15
Payer: COMMERCIAL

## 2023-02-15 NOTE — TELEPHONE ENCOUNTER
This is a common scenario . Her anxiety is yes, 100% a factor in her blood pressure readings. [ see most recent previous office visit notes with me from 2-2-23 where we again review these exact same issues in significant detail  ] Remind patient to try not to focus on indivigual readings, it's never helpful. It's not specific numbers we worry about. Even when we face a deal with a patient who has sub-optimal blood pressure control, it's not helpful to check blood pressure that often. 3-6 times per week is plenty.    We don't worry about specific numbers . It's only an emergency if patient hours the following symptoms - see in red bold test below    A hypertensive crisis is a severe increase in blood pressure that can lead to a stroke.     Extremely high blood pressure -- a top number (systolic pressure) of 180 millimeters of mercury (mm Hg) or higher or a bottom number (diastolic pressure) of 120 mm Hg or higher -- can damage blood vessels. The blood vessels become inflamed and may leak fluid or blood. As a result, the heart may not be able to pump blood effectively.    Causes of a hypertensive emergency include:    Forgetting to take your blood pressure medication  Stroke  Heart attack  Heart failure  Kidney failure  Rupture of your body's main artery (aorta)  Interaction between medications  Convulsions during pregnancy (eclampsia)  A hypertensive crisis is divided into two categories: urgent and emergency. In an urgent hypertensive crisis, your blood pressure is extremely high, but your doctor doesn't suspect you have any damage to your organs.    In an emergency hypertensive crisis, your blood pressure is extremely high and has caused damage to your organs. An emergency hypertensive crisis can be associated with life-threatening complications.    Signs and symptoms of a hypertensive crisis that may be life-threatening may include:    Severe chest pain  Severe headache, accompanied by confusion and blurred  vision  Nausea and vomiting  Severe anxiety  Shortness of breath  Seizures  Unresponsiveness    If you experience a severe increase in your blood pressure, seek immediate medical attention. Treatment for hypertensive crisis may include hospitalization for treatment with oral or intravenous medications.    Adding to this general discussion above, I recommend the following     We can further adjust her medication as follows     Continue Toprol [metoprolol XL] same dose  Continue losartan ( Cozaar ) same dose  Add MICROZIDE  (hydrochlorothiazide, USP 12.5 mg) , assuming she is on board , reroute for orders to be placed please T up order  Finally I wonder if she thinks her anxiety is to a level that needs to be addressed with treatment for this condition separately ?    Missael Fair MD

## 2023-02-15 NOTE — TELEPHONE ENCOUNTER
Dr. Fair,     Patient called with updates on BP as stated in visit note 2/2/23.       Per pt since dosage change of losartan BPs remain high and have gotten higher, using wrist cuff:    2/15 171/90  2/14 161/108  2/13 169/109  2/12 148/88    Per pt denying symptoms. Stated she does get anxious when she has a high reading so she waits and checks again and the readings do come back higher. Unsure if her anxiety could be playing a part into her readings.     [phone], ok for detailed message.     Thanks,  Jennifer, RN  [facility]

## 2023-02-15 NOTE — TELEPHONE ENCOUNTER
"Patient notified of Provider's message as written.  Patient verbalized understanding.    She would like to think about adding medication as she really does not want to take more meds.   Patient stated, \"so he does not want to see me unless I am having chest pains or symptoms\"  Explained that she can schedule with Missael Calixto if she would like to discuss treatment options at a visit.   She agreed with an appointment     Appointment scheduled with Missael Calixto for 2/24/2023    Conner Willett RN  [facility]    "

## 2023-02-21 ENCOUNTER — NURSE TRIAGE (OUTPATIENT)
Dept: NURSING | Facility: CLINIC | Age: 78
End: 2023-02-21
Payer: COMMERCIAL

## 2023-02-21 NOTE — TELEPHONE ENCOUNTER
Patient is calling with hypertension.  Currently blood pressure is 164/11.  Patient has not taken her blood pressure medication this morning yet.  Patient states blood pressure is also high after taking medication.  Patient denies confusion and severe difficulty breathing and denies weakness and numbness of the face, arm or leg on one side of the body.  Denies chest pain.  Denies unsteady gait, blurred vision.  Patient states that she has an appointment on Friday 2/24/2023.    Reason for Disposition    Systolic BP  >= 160 OR Diastolic >= 100    Additional Information    Negative: Difficult to awaken or acting confused (e.g., disoriented, slurred speech)    Negative: SEVERE difficulty breathing (e.g., struggling for each breath, speaks in single words)    Negative: [1] Weakness of the face, arm or leg on one side of the body AND [2] new-onset    Negative: [1] Numbness (i.e., loss of sensation) of the face, arm or leg on one side of the body AND [2] new-onset    Negative: [1] Chest pain lasts > 5 minutes AND [2] history of heart disease (i.e., heart attack, bypass surgery, angina, angioplasty, CHF)    Negative: [1] Chest pain AND [2] took nitrogylcerin AND [3] pain was not relieved    Negative: Sounds like a life-threatening emergency to the triager    Negative: [1] Systolic BP  >= 160 OR Diastolic >= 100 AND [2] cardiac or neurologic symptoms (e.g., chest pain, difficulty breathing, unsteady gait, blurred vision)    Negative: [1] Pregnant 20 or more weeks (or postpartum < 6 weeks) AND [2] new hand or face swelling    Negative: [1] Pregnant 20 or more weeks (or postpartum < 6 weeks) AND [2] Systolic BP >= 160 OR Diastolic >= 100    Negative: [1] Systolic BP  >= 200 OR Diastolic >= 120 AND [2] having NO cardiac or neurologic symptoms    Negative: [1] Pregnant 20 or more weeks (or postpartum < 6 weeks) AND [2] Systolic BP  >= 140 OR Diastolic >= 90    Negative: [1] Systolic BP  >= 180 OR Diastolic >= 110 AND [2] missed  most recent dose of blood pressure medication    Negative: Systolic BP  >= 180 OR Diastolic >= 110    Negative: Ran out of BP medications    Protocols used: BLOOD PRESSURE - HIGH-A-AH

## 2023-02-24 ENCOUNTER — OFFICE VISIT (OUTPATIENT)
Dept: INTERNAL MEDICINE | Facility: CLINIC | Age: 78
End: 2023-02-24
Payer: COMMERCIAL

## 2023-02-24 VITALS
BODY MASS INDEX: 23.83 KG/M2 | HEIGHT: 61 IN | HEART RATE: 69 BPM | RESPIRATION RATE: 16 BRPM | OXYGEN SATURATION: 97 % | DIASTOLIC BLOOD PRESSURE: 98 MMHG | TEMPERATURE: 98.1 F | WEIGHT: 126.2 LBS | SYSTOLIC BLOOD PRESSURE: 164 MMHG

## 2023-02-24 DIAGNOSIS — Z12.31 VISIT FOR SCREENING MAMMOGRAM: ICD-10-CM

## 2023-02-24 DIAGNOSIS — I25.119 CORONARY ARTERY DISEASE INVOLVING NATIVE CORONARY ARTERY OF NATIVE HEART WITH ANGINA PECTORIS (H): ICD-10-CM

## 2023-02-24 DIAGNOSIS — I10 HYPERTENSION GOAL BP (BLOOD PRESSURE) < 140/90: Primary | Chronic | ICD-10-CM

## 2023-02-24 DIAGNOSIS — N18.31 STAGE 3A CHRONIC KIDNEY DISEASE (H): ICD-10-CM

## 2023-02-24 LAB
ANION GAP SERPL CALCULATED.3IONS-SCNC: 5 MMOL/L (ref 3–14)
BUN SERPL-MCNC: 18 MG/DL (ref 7–30)
CALCIUM SERPL-MCNC: 9.5 MG/DL (ref 8.5–10.1)
CHLORIDE BLD-SCNC: 106 MMOL/L (ref 94–109)
CO2 SERPL-SCNC: 27 MMOL/L (ref 20–32)
CREAT SERPL-MCNC: 0.92 MG/DL (ref 0.52–1.04)
GFR SERPL CREATININE-BSD FRML MDRD: 63 ML/MIN/1.73M2
GLUCOSE BLD-MCNC: 108 MG/DL (ref 70–99)
POTASSIUM BLD-SCNC: 4 MMOL/L (ref 3.4–5.3)
SODIUM SERPL-SCNC: 138 MMOL/L (ref 133–144)

## 2023-02-24 PROCEDURE — 80048 BASIC METABOLIC PNL TOTAL CA: CPT | Performed by: INTERNAL MEDICINE

## 2023-02-24 PROCEDURE — 99215 OFFICE O/P EST HI 40 MIN: CPT | Performed by: INTERNAL MEDICINE

## 2023-02-24 PROCEDURE — 36415 COLL VENOUS BLD VENIPUNCTURE: CPT | Performed by: INTERNAL MEDICINE

## 2023-02-24 RX ORDER — HYDROCHLOROTHIAZIDE 12.5 MG/1
12.5 TABLET ORAL DAILY
Qty: 30 TABLET | Refills: 0 | Status: SHIPPED | OUTPATIENT
Start: 2023-02-24 | End: 2024-02-23

## 2023-02-24 RX ORDER — LOSARTAN POTASSIUM 100 MG/1
50 TABLET ORAL DAILY
Qty: 90 TABLET | Refills: 1 | Status: SHIPPED | OUTPATIENT
Start: 2023-02-24 | End: 2024-02-23

## 2023-02-24 NOTE — PROGRESS NOTES
"  Assessment & Plan     Hypertension goal BP (blood pressure) < 140/90  Today is a further follow up appointment from 3 weeks ago. Patient is frustrated that with the upwards adjustment with her medication, changing the losartan ( Cozaar ) up to 100 from 50 milligrams, she noted absolutely no difference with her blood pressure. She brought her book with her and we can see approximately 40 blood pressure measurements over the last month or so and we averaged these to approximately 150/90. She has a few outliers with numbers reaching up to 190/100 and part of what frustrates this patient is that the blood pressure elevations seem to be entirely unpredictable and dont seem to be specifically tied in to her stress level or time of day or what she might be doing.  In fact she is frustrated in a foundational sense and we spent a lot of time today reviewing the philosophical approach to blood pressure treatment. She is in general a very healthy woman except for some symptoms and her stress level. I think she is a \"high strung\" type A personality and tends to get tense and wrapped up in issues and it's true that this approach to the world may at least in part drive her blood pressure elevations especially in the clear cut setting of stress. We reviewed her options . I really don't see any specific actions she can take besides medication adjustments, she's already receiving acupuncture and working with a Chinese medicine practitioner. She doesn't smoke or use alcohol , she is quite active physically and has a ideal weight . She isn't a big salt user. We agreed with the following   1. Decrease losartan ( Cozaar ) back to 50 milligrams since we saw no benefit form increase to 100  2. Will continue current plan of care  With beta blocker since she is limited from further dose increases secondary to relative bradycardia and  3. We added a small dose of hydrochlorothiazide   4. In 3-4 weeks she will return to clinic for a basic " metabolic panel and medical assistant blood pressure recheck     - hydrochlorothiazide (HYDRODIURIL) 12.5 MG tablet; Take 1 tablet (12.5 mg) by mouth daily  - Basic metabolic panel  (Ca, Cl, CO2, Creat, Gluc, K, Na, BUN); Future  - losartan (COZAAR) 100 MG tablet; Take 0.5 tablets (50 mg) by mouth daily  - Basic metabolic panel  (Ca, Cl, CO2, Creat, Gluc, K, Na, BUN)    Stage 3a chronic kidney disease (H)  Problem is stable and ongoing monitoring    - losartan (COZAAR) 100 MG tablet; Take 0.5 tablets (50 mg) by mouth daily    Visit for screening mammogram  Ordered   - MA SCREENING DIGITAL BILAT - Future  (s+30); Future    Coronary artery disease involving native coronary artery of native heart with angina pectoris (H)  We again reviewed this issue. Curiously patient has never started the Crestor [ rosuvastatin ] which we prescribed at the last appointment. Despite knowing the reasons for this medication and agreeing with me in taking the medication she just hadn't gotten around to filling this. I encouraged her to do so      Review of the result(s) of each unique test - upcoming [facility]  Prescription drug management  50 minutes spent on the date of the encounter doing chart review, history and exam, documentation and further activities per the note      Return in about 4 weeks (around 3/24/2023) for MA blood pressure recheck in 2-3 weeks.    Missael Fair MD  [facility] JOSE Frank is a 77 year old accompanied by her none, presenting for the following health issues:  Hypertension      History of Present Illness       Hypertension: She presents for follow up of hypertension.  She does check blood pressure  regularly outside of the clinic. Outside blood pressures have been over 140/90. She does not follow a low salt diet.     She eats 2-3 servings of fruits and vegetables daily.She consumes 0 sweetened beverage(s) daily.She exercises with enough effort to increase her heart rate 30 to 60  "minutes per day.  She exercises with enough effort to increase her heart rate 5 days per week.   She is taking medications regularly.     Patient seen today with a bushel full of questions including   Blood pressure issues  Facial lesion  Facial pain / tooth pain  Has a lingering coughing   Sees an acupuncturist and is taking some vitamin supplements called - Alma Gregorio tangential answers to questions, Symmetry, Resolve  I did see patient for her routine follow up office visit 2/2/2023 and comments that she's seeing her ophthalmologist and suspects she has a cataract extraction upcoming  She's status post aortic valve replacement   Waves her hands around her head especially right sided face / sinusis\" it's blocked up here\" and right sided facial issues. I questioned about if she had an Ear, Nose, and Throat specialist consultation ? 4-5 years ago she had a sinus ct scan and was treated with antibiotics, she's had cautery for history of nose bleed    Has long term use of a cpap machine and mask      We discussed her chief complaint of blood pressure really at a long perspective    Patient has declined to take the Crestor [ rosuvastatin ] although she is suggesting doing to do so.    Wt Readings from Last 5 Encounters:   02/24/23 57.2 kg (126 lb 3.2 oz)   02/02/23 56.7 kg (125 lb)   01/17/23 58.3 kg (128 lb 9.6 oz)   07/11/22 56.7 kg (125 lb)   06/07/22 55.8 kg (123 lb)       5 issues surrounding treatment of hypertension     1) no smoking, quit right away  2) excess alcohol  , needs to be reduced   3) cut back or reduce salt intake ?  4) if sedentary, need to engage in structured exercise program   5) if overweight, work on weight loss [ combination of exercise and diet efforts ]    6] if mean / average blood pressure is above the upper limit of recommended guidelines of 140/90 then starting / increasing  medication is appropriate to strive to keep blood pressure within recommended treatment     Body mass index is " "23.85 kg/m .      Review of Systems   Constitutional, HEENT, cardiovascular, pulmonary, gi and gu systems are negative, except as otherwise noted.      Objective    BP (!) 154/82   Pulse 69   Temp 98.1  F (36.7  C) (Temporal)   Resp 16   Ht 1.549 m (5' 1\")   Wt 57.2 kg (126 lb 3.2 oz)   LMP  (LMP Unknown)   SpO2 97%   BMI 23.85 kg/m    Body mass index is 23.85 kg/m .  Physical Exam   GENERAL: healthy, alert and no distress  EYES: Eyes grossly normal to inspection, PERRL and conjunctivae and sclerae normal  HENT: ear canals and TM's normal, nose and mouth without ulcers or lesions  NECK: no adenopathy, no asymmetry, masses, or scars and thyroid normal to palpation  MS: no gross musculoskeletal defects noted, no edema  NEURO: Normal strength and tone, mentation intact and speech normal  PSYCH: mentation appears normal, affect normal/bright    No orders of the defined types were placed in this encounter.      "

## 2023-02-24 NOTE — LETTER
February 28, 2023    Monkia Serrano  [address] [id number]          Dear ,    We are writing to inform you of your test results.  All of these tests are within acceptable limits , things look good !         Resulted Orders   Basic metabolic panel  (Ca, Cl, CO2, Creat, Gluc, K, Na, BUN)   Result Value Ref Range    Sodium 138 133 - 144 mmol/L    Potassium 4.0 3.4 - 5.3 mmol/L    Chloride 106 94 - 109 mmol/L    Carbon Dioxide (CO2) 27 20 - 32 mmol/L    Anion Gap 5 3 - 14 mmol/L    Urea Nitrogen 18 7 - 30 mg/dL    Creatinine 0.92 0.52 - 1.04 mg/dL    Calcium 9.5 8.5 - 10.1 mg/dL    Glucose 108 (H) 70 - 99 mg/dL    GFR Estimate 63 >60 mL/min/1.73m2      Comment:      eGFR calculated using 2021 CKD-EPI equation.       If you have any questions or concerns, please call the clinic at the number listed above.       Sincerely,      Missael Fair MD

## 2023-03-13 ENCOUNTER — TELEPHONE (OUTPATIENT)
Dept: SLEEP MEDICINE | Facility: CLINIC | Age: 78
End: 2023-03-13
Payer: COMMERCIAL

## 2023-03-13 DIAGNOSIS — G47.33 OSA (OBSTRUCTIVE SLEEP APNEA): Chronic | ICD-10-CM

## 2023-03-13 DIAGNOSIS — F51.04 CHRONIC INSOMNIA: ICD-10-CM

## 2023-03-13 DIAGNOSIS — G47.33 OSA (OBSTRUCTIVE SLEEP APNEA): Primary | ICD-10-CM

## 2023-03-13 DIAGNOSIS — R45.1 MOTOR RESTLESSNESS: Primary | ICD-10-CM

## 2023-03-13 NOTE — TELEPHONE ENCOUNTER
Patient would like to have an Rx for cpap supplies before upcoming appointment.     Patient will go to [facility] for 30 day download but was wondering if she can  cpap supplies as well while she is there.     Please advise JER Somers. OK for order? Appointment is on 3/17/23 at 2:30 PM via VIDEO.

## 2023-03-13 NOTE — TELEPHONE ENCOUNTER
Order/Referral Request    Who is requesting:  Patient     Orders being requested: New order for supplies     Reason service is needed/diagnosis: Sleep     When are orders needed by: ASAP    Has this been discussed with Provider: Yes    Does patient have a preference on a Group/Provider/Facility? NA    Does patient have an appointment scheduled?: Yes: video 3/17/23 @ 2:30p with Uche Warren    Where to send orders: Fax    Could we send this information to you in [facility] or would you prefer to receive a phone call?:   Patient would prefer a phone call   Okay to leave a detailed message?: Yes at Home number on file [phone] (home)

## 2023-03-13 NOTE — TELEPHONE ENCOUNTER
CALLED PT LET HER KNOW SHE DOES NOT NEED APPT FOR DL IN SP AND [facility]NA WALK IN BETWEEN 8AM TO 4:30PM. PT SAID SHE NEEDS SUPPLIES LET HER KNOW RX IS  SHE SAID SHE HAS APPT WITH SLEEP PROVIDER ON FRIDAY. TOLD EHR UNTIL WE GET RX WE CANT GIVE OUT SUPPLIES. PT UNDERSTANDS.    Earnest Plata, Respiratory DME Coordinator

## 2023-03-17 ENCOUNTER — VIRTUAL VISIT (OUTPATIENT)
Dept: SLEEP MEDICINE | Facility: CLINIC | Age: 78
End: 2023-03-17
Payer: COMMERCIAL

## 2023-03-17 VITALS — HEIGHT: 61 IN | BODY MASS INDEX: 23.22 KG/M2 | WEIGHT: 123 LBS

## 2023-03-17 DIAGNOSIS — G47.33 OSA (OBSTRUCTIVE SLEEP APNEA): Primary | Chronic | ICD-10-CM

## 2023-03-17 DIAGNOSIS — J34.89 PAIN OF MAXILLARY SINUS: ICD-10-CM

## 2023-03-17 PROCEDURE — 99214 OFFICE O/P EST MOD 30 MIN: CPT | Mod: VID | Performed by: NURSE PRACTITIONER

## 2023-03-17 ASSESSMENT — SLEEP AND FATIGUE QUESTIONNAIRES
HOW LIKELY ARE YOU TO NOD OFF OR FALL ASLEEP IN A CAR, WHILE STOPPED FOR A FEW MINUTES IN TRAFFIC: WOULD NEVER DOZE
HOW LIKELY ARE YOU TO NOD OFF OR FALL ASLEEP WHILE SITTING QUIETLY AFTER LUNCH WITHOUT ALCOHOL: WOULD NEVER DOZE
HOW LIKELY ARE YOU TO NOD OFF OR FALL ASLEEP WHILE WATCHING TV: SLIGHT CHANCE OF DOZING
HOW LIKELY ARE YOU TO NOD OFF OR FALL ASLEEP WHEN YOU ARE A PASSENGER IN A CAR FOR AN HOUR WITHOUT A BREAK: MODERATE CHANCE OF DOZING
HOW LIKELY ARE YOU TO NOD OFF OR FALL ASLEEP WHILE LYING DOWN TO REST IN THE AFTERNOON WHEN CIRCUMSTANCES PERMIT: SLIGHT CHANCE OF DOZING
HOW LIKELY ARE YOU TO NOD OFF OR FALL ASLEEP WHILE SITTING INACTIVE IN A PUBLIC PLACE: WOULD NEVER DOZE
HOW LIKELY ARE YOU TO NOD OFF OR FALL ASLEEP WHILE SITTING AND TALKING TO SOMEONE: WOULD NEVER DOZE
HOW LIKELY ARE YOU TO NOD OFF OR FALL ASLEEP WHILE SITTING AND READING: SLIGHT CHANCE OF DOZING

## 2023-03-17 ASSESSMENT — PAIN SCALES - GENERAL: PAINLEVEL: NO PAIN (0)

## 2023-03-17 NOTE — NURSING NOTE
Is the patient currently in the state of [place]? YES    Visit mode:VIDEO    If the visit is dropped, the patient can be reconnected by: VIDEO VISIT: Text to cell phone: [phone]    Will anyone else be joining the visit? NO      How would you like to obtain your AVS? MyChart    Are changes needed to the allergy or medication list? NO    Reason for visit: return  David Bales

## 2023-03-17 NOTE — PROGRESS NOTES
Video-Visit Details    Type of service:  Video Visit    Video Start Time (time video started): 2:39 PM    Video End Time (time video stopped): 3:04 PM      Originating Location (pt. Location): Home        Distant Location (provider location):  Off-site    Mode of Communication:  Video Conference via [facility]      Sleep Apnea - Follow-up Visit:    Impression/Plan:  1. HAM (obstructive sleep apnea)  2. Pain of maxillary sinus    Mild Sleep apnea, severe in REM or supine sleep. Tolerating PAP well. Daytime symptoms are improved.  Patient continues to benefit from PAP therapy, however, she reports chronic sinus issues with possibly worsening of symptoms in her right maxillary and orbital sinus area which may also be contributing to her pain in her teeth.  I have recommended the patient follow-up with her ENT provider for further evaluation/management of this problem.    A review of her APAP download data shows excellent use and compliance and mild HAM that is in good control on current pressure settings.    Continue current plan of care.  A DME order for new nasal mask and supplies was placed prior to this visit.  No new orders placed today.    Monika Serrano will follow up with Dr. Xavi Crane in about 1 year(s).     30 minutes spent with patient, all of which were spent face-to-face counseling, consulting, chart review/documentation, and coordinating plan of care on the date of the encounter.      GONZALEZ Somers CNP  Sleep Medicine      CC:  Missael Fair,         History of Present Illness:  Chief Complaint   Patient presents with     Video Visit       Monika Serrano is a 77-year-old female who presents for annual follow-up of their mild sleep apnea, severe in REM or supine sleep, managed with CPAP.  The patient was last seen by Dr. Xavi Crane in virtual visit on 10/27/2020 for HAM on CPAP follow-up visit. She reports issues with dry eyes related to previous nasal pillow mask, now improved with new mask  with change in tubing on top of head.    DME: [facility]       Monika Serrano for follow-up of their mild sleep apnea, severe in REM or supine sleep, managed with CPAP.     Sleep study 2002- GRULLON 25, low O2 91%. MSLT 8.4, no SOREMs   Sleep study 2003- AHI 12.1, RDI 24, Lo2 91%. CPAP 10cm effective.     She initially presented to [facility] in 2013 with symptoms of sleep maintenance difficulties and a history of mild obstructive sleep apnea by sleep study 2003. No improvement in insomnia symptoms with CPAP, previously.    - Insomnia, psychophysiologic.   - Motor restlessness.       Sleep study on 5/22/13 (111#) - AHI 11.1, supine AHI 30.7, REM AHI 48, desaturations down to 84%, 1.8 minutes <+ 90%, RDI 41.2. REM AHI 48, consistent with excessive REM HAM. Periodic Limb Movement Index 0/hour.     She elected another trial of CPAP which did seem to help some with sleep maintenance difficulties, though she had comorbid insomnia and pain issues at the time.    Do you use a CPAP Machine at home: Yes  Overall, on a scale of 0-10 how would you rate your CPAP (0 poor, 10 great): 4    What type of mask do you use: Nasal Pillow  Is your mask comfortable: No  If not, why: Air dries eyes    Is your mask leaking: No  If yes, where do you feel it:    How many night per week does the mask leak (0-7):      Do you notice snoring with mask on: No  Do you notice gasping arousals with mask on: No  Are you having significant oral or nasal dryness: Yes  Is the pressure setting comfortable: Yes  If not, why:      What is your typical bedtime:    How long does it take you to go to sleep on PAP therapy: Depends different all the time  What time do you typically get out of bed for the day: 10 -10:30  How many hours on average per night are you using PAP therapy:    How many hours are you sleeping per night:    Do you feel well rested in the morning:        Respironics DreamStation  Auto-PAP 4 - 20 cmH2O 30 day usage data:   2/12/23 -3/13/23  96.7% of days with > 4 hours of use. 0/30 days with no use.   Average use 8 hours 14 minutes per day.   Average time in large leak per day: 2 secs.    CPAP 90% pressure 8.9 cm.   AHI 3.2 events per hour.         EPWORTH SLEEPINESS SCALE      [place] Sleepiness Scale ( HELIO Hinson  [id number]<br>ESS - [place]/English - Final version - 21 Nov 07 - [facility] [place].) 3/17/2023   Sitting and reading Slight chance of dozing   Watching TV Slight chance of dozing   Sitting, inactive in a public place (e.g. a theatre or a meeting) Would never doze   As a passenger in a car for an hour without a break Moderate chance of dozing   Lying down to rest in the afternoon when circumstances permit Slight chance of dozing   Sitting and talking to someone Would never doze   Sitting quietly after a lunch without alcohol Would never doze   In a car, while stopped for a few minutes in traffic Would never doze   [place] Score (MC) 5   [place] Score (Sleep) 5       INSOMNIA SEVERITY INDEX (ARSLAN)      Insomnia Severity Index (ARSLAN) 3/17/2023   Difficulty falling asleep 1   Difficulty staying asleep 1   Problems waking up too early 1   How SATISFIED/DISSATISFIED are you with your CURRENT sleep pattern? 1   How NOTICEABLE to others do you think your sleep problem is in terms of impairing the quality of your life? 1   How WORRIED/DISTRESSED are you about your current sleep problem? 2   To what extent do you consider your sleep problem to INTERFERE with your daily functioning (e.g. daytime fatigue, mood, ability to function at work/daily chores, concentration, memory, mood, etc.) CURRENTLY? 2   ARSLAN Total Score 9       Guidelines for Scoring/Interpretation:  Total score categories:  0-7 = No clinically significant insomnia   8-14 = Subthreshold insomnia   15-21 = Clinical insomnia (moderate severity)  22-28 = Clinical insomnia (severe)  Used via courtesy of www.MobileIronth.va.gov with permission from Isaac Crain PhD.,  [facility]      Past medical/surgical history, family history, social history, medications and allergies were reviewed.        Problem List:  Patient Active Problem List    Diagnosis Date Noted     Fibromyalgia 05/06/2010     Priority: High     Prophylactic antibiotic 02/02/2023     Priority: Medium     Sensorineural hearing loss, unilateral 07/11/2022     Priority: Medium     Hypertension goal BP (blood pressure) < 140/90 09/17/2019     Priority: Medium     Stage 3a chronic kidney disease (H) 08/15/2019     Priority: Medium     Combined forms of age-related cataract, mild, of left eye 04/30/2019     Priority: Medium     Combined forms of age-related cataract, mild-mod, of right eye 04/30/2019     Priority: Medium     Stable branch retinal vein occlusion of right eye 04/30/2019     Priority: Medium     Coronary artery disease involving native coronary artery of native heart with angina pectoris (H) 01/09/2017     Priority: Medium     angiogram: The left main artery has minimal disease. the LAD has mild to moderate disease. The circumflex artery has minimal disease, co-dominant. RCA has moderate disease, co-dominant       S/P aortic valve replacement 12/28/2016     Priority: Medium     12/2016 for critical aortic stenosis.        Hyperlipidemia LDL goal <40      Priority: Medium     HAM (obstructive sleep apnea)- milld-moderate      Priority: Medium     Sleep study 2002- GRULLON 25, low O2 91%. MSLT 8.4, no SOREMs  Sleep study 2003- AHI 12.1, RDI 24, Lo2 91%. CPAP 10cm effective.   Sleep study on 5/22/13 (111#) - AHI 11.1, supine AHI 30.7, REM AHI 48, desaturations down to 84%, 1.8 minutes <+ 90%, RDI 41.2. REM AHI 48, consistent with excessive REM HAM. Periodic Limb Movement Index 0/hour.        Motor restlessness 09/30/2018     Priority: Low     zAdvanced directives, counseling/discussion 12/28/2011     Priority: Low     Advance Directive Problem List Overview:   Name Relationship Phone    Primary Health Care  Agent Sukhjinder Davis Spouse [phone]  [phone]         Alternative Health Care Agent Tabitha Watts Daughter [phone]       Discussed advance care planning with patient; information given to patient to review. 12/28/2011   Advance Directive Follow-up Visit  Monika Serrano presents for advanced care planning session accompanied by no one.  Discussed definitions of advanced care planning and advance directive form, and informational handouts given to patient.  Patient voices understanding of advance care planning and advance directive form    Designated healthcare agent is identified. Healthcare agent s name is Sukhjinder Davis.  Designated healthcare agent concerns:  None per patient.  My Hopes and Wishes reviewed and patient and designated healthcare agent are in agreement per patient.  Finalized wishes are clear to patient.  Patient will go over the healthcare directive wishes she made with her health care agents at home.  Patient and designated healthcare agent are aware that document may be changed at any time in the future.    Advanced directive form: completed at this visit.  Advanced directive form: verified with notary signature..  Original and two copies of advanced directive form given to patient copy sent to  for scanning into EMR and original given to patient and instructed to give copy to designated [facility] and non-[facility] physician where applicable.  Becky Mobley RN  1/18/2012       Chronic insomnia 05/06/2010     Priority: Low     Osteopenia      Priority: Low      Current Outpatient Medications   Medication     cetirizine (ZYRTEC) 10 MG tablet     hydrochlorothiazide (HYDRODIURIL) 12.5 MG tablet     losartan (COZAAR) 100 MG tablet     metoprolol succinate ER (TOPROL XL) 50 MG 24 hr tablet     NEW MED     ORDER FOR DME     UNABLE TO FIND     UNABLE TO FIND     UNABLE TO FIND     rosuvastatin (CRESTOR) 10 MG tablet     No current facility-administered medications for  "this visit.       Ht 1.549 m (5' 1\")   Wt 55.8 kg (123 lb)   LMP  (LMP Unknown)   BMI 23.24 kg/m        This note was written with the assistance of the Dragon voice-dictation technology software. The final document, although reviewed, may contain errors. For corrections, please contact the office.    "

## 2023-03-17 NOTE — PATIENT INSTRUCTIONS
"MY INFORMATION ON SLEEP APNEA-  Monika Serrano    DOCTOR : GONZALEZ Somers CNP  SLEEP CENTER :      MY CONTACT NUMBER:   [facility]  [phone]  [facility]   [phone]  [facility]   [phone]      [facility]  [phone]  [facility]   [phone]      Merchant Points:  1. What is Obstructive Sleep Apnea (HAM)? HAM is the most common type of sleep apnea. Apnea literally means, \"without breath.\" It is characterized by repetitive pauses in breathing, despite continued effort to breathe, and is usually associated with a reduction in blood oxygen saturation. Apneas can last 10 to over 60 seconds. It is caused by narrowing or collapse of the upper airway as muscles relax during sleep.   2. What are the consequences of HAM? Symptoms include: daytime sleepiness- possibly increasing the risk of falling asleep while driving, unrefreshing/restless sleep, snoring, insomnia, waking frequently to urinate, waking with heartburn or reflux, reduced concentration and memory, and morning headaches. Other health consequences may include development of high blood pressure. Untreated HAM also can contribute to heart disease, stroke and diabetes.   3. What are the treatment options? In most situations, sleep apnea is a lifelong disease that must be managed with daily therapy. Continuous Positive Airway (CPAP) is the most reliable treatment. A mouthguard to hold your jaw forward is usually the next most reliable option. Other options include postioning devices (to keep you off your back), nasal valves, tongue retaining device, weight loss, surgery. There is more detail about these options toward the end of this document.  4. What are the most important things to remember about using CPAP?     WHERE CAN I FIND MORE INFORMATION?    American Academy of Sleep Medicine Patient information on sleep " disorders:  http://yoursleep.aasmnet.org    CPAP -  WHY AND HOW?                 Continuous positive airway pressure, or CPAP, is the most effective treatment for obstructive sleep apnea. It works by blowing room air, through a mask, to hold your throat open. A decision to use CPAP is a major step forward in the pursuit of a healthier life. The successful use of CPAP will help you breathe easier, sleep better and live healthier. Using CPAP can be a positive experience if you keep these schmidt points in mind:  Commitment  CPAP is not a quick fix for your problem. It involves a long-term commitment to improve your sleep and your health.    Communication  Stay in close communication with both your sleep doctor and your CPAP supplier. Ask lots of questions and seek help when you need it.    Consistency  Use CPAP all night, every night and for every nap. You will receive the maximum health benefits from CPAP when you use it every time that you sleep. This will also make it easier for your body to adjust to the treatment.    Correction  The first machine and mask that you try may not be the best ones for you. Work with your sleep doctor and your CPAP supplier to make corrections to your equipment selection. Ask about trying a different type of machine or mask if you have ongoing problems. Make sure that your mask is a good fit and learn to use your equipment properly.    Challenge  Tell a family member or close friend to ask you each morning if you used your CPAP the previous night. Have someone to challenge you to give it your best effort.    Connection   Your adjustment to CPAP will be easier if you are able to connect with others who use the same treatment. Ask your sleep doctor if there is a support group in your area for people who have sleep apnea, or look for one on the Internet.  Comfort   Increase your level of comfort by using a saline spray, decongestant or heated humidifier if CPAP irritates your nose, mouth or  "throat. Use your unit's \"ramp\" setting to slowly get used to the air pressure level. There may be soft pads you can buy that will fit over your mask straps. Look on www.CPAP.com for accessories that can help make CPAP use more comfortable.  Cleaning   Clean your mask, tubing and headgear on a regular basis. Put this time in your schedule so that you don't forget to do it. Check and replace the filters for your CPAP unit and humidifier.    Completion   Although you are never finished with CPAP therapy, you should reward yourself by celebrating the completion of your first month of treatment. Expect this first month to be your hardest period of adjustment. It will involve some trial and error as you find the machine, mask and pressure settings that are right for you.    Continuation  After your first month of treatment, continue to make a daily commitment to use your CPAP all night, every night and for every nap.    CPAP-Tips to starting with success:  Begin using your CPAP for short periods of time during the day while you watch TV or read.    Use CPAP every night and for every nap. Using it less often reduces the health benefits and makes it harder for your body to get used to it.    Newer CPAP models are virtually silent; however, if you find the sound of your CPAP machine to be bothersome, place the unit under your bed to dampen the sound.     Make small adjustments to your mask, tubing, straps and headgear until you get the right fit. Tightening the mask may actually worsen the leak.  If it leaves significant marks on your face or irritates the bridge of your nose, it may not be the best mask for you.  Speak with the person who supplied the mask and consider trying other masks. Insurances will allow you to try different masks during the first month of starting CPAP.  Insurance also covers a new mask, hose and filter about every 6 months.    Use a saline nasal spray to ease mild nasal congestion. Neti-Pot or " saline nasal rinses may also help. Nasal gel sprays can help reduce nasal dryness.  Biotene mouthwash can be helpful to protect your teeth if you experience frequent dry mouth.  Dry mouth may be a sign of air escaping out of your mouth or out of the mask in the case of a full face mask.  Speak with your provider if you expect that is the case.     Take a nasal decongestant to relieve more severe nasal or sinus congestion.  Do not use Afrin (oxymetazoline) nasal spray more than 3 days in a row.  Speak with your sleep doctor if your nasal congestion is chronic.    Use a heated humidifier that fits your CPAP model to enhance your breathing comfort. Adjust the heat setting up if you get a dry nose or throat, down if you get condensation in the hose or mask.  Position the CPAP lower than you so that any condensation in the hose drains back into the machine rather than towards the mask.    Try a system that uses nasal pillows if traditional masks give you problems.    Clean your mask, tubing and headgear once a week. Make sure the equipment dries fully.    Regularly check and replace the filters for your CPAP unit and humidifier.    Work closely with your sleep provider and your CPAP supplier to make sure that you have the machine, mask and air pressure setting that works best for you. It is better to stop using it and call your provider to solve problems than to lay awake all night frustrated with the device.    Weight Loss:    Weight loss decreases severity of sleep apnea in most people with obesity. For those with mild obesity who have developed snoring with weight gain, even 15-30 pound weight loss can improve and occasionally eliminate sleep apnea.  Structured and life-long dietary and health habits are necessary to lose weight and keep healthier weight levels.     Though there are significant health benefits from weight loss, long-term weight loss is very difficult to achieve- studies show success with dietary  management in less than 10% of people. In addition, substantial weight loss may require years of dietary control and may be difficult if patients have severe obesity. In these cases, surgical management may be considered.    If you are interested in methods for weight loss, you should review the options discussed at the National Institutes of Health patient information sites:     http://win.niddk.nih.gov/publications/index.htm  http:/www.health.nih.gov/topic/WeightLossDieting    Bariatric programs offer counseling in all methods of weight loss:    Http:/www.[facility].org/Specialties/WeightLossSurgeryandMedicalMgmt/htm    Your BMI is Body mass index is 23.24 kg/m .    Body mass index (BMI) is one way to tell whether you are at a healthy weight, overweight, or obese. It measures your weight in relation to your height.  A BMI of 18.5 to 24.9 is in the healthy range. A person with a BMI of 25 to 29.9 is considered overweight, and someone with a BMI of 30 or greater is considered obese.  Another way to find out if you are at risk for health problems caused by overweight and obesity is to measure your waist. If you are a woman and your waist is more than 35 inches, or if you are a man and your waist is more than 40 inches, your risk of disease may be higher.  More than two-thirds of American adults are considered overweight or obese. Being overweight or obese increases the risk for further weight gain.  Excess weight may lead to heart disease and diabetes. Creating and following plans for healthy eating and physical activity may help you improve your health.    Methods for maintaining or losing weight.    Weight control is part of healthy lifestyle and includes exercise, emotional health, and healthy eating habits.  Careful eating habits lifelong is the mainstay of weight control.  Though there are significant health benefits from weight loss, long-term weight loss with diet alone may be very difficult to achieve-  studies show long-term success with dietary management in less than 10% of people. Attaining a healthy weight may be especially difficult to achieve in those with severe obesity. In some cases, medications, devices and surgical management might be considered.    What can you do?    If you are overweight or obese and are interested in methods for weight loss, you should discuss this with your provider. In addition, we recommend that you review healthy life styles and methods for weight loss available through the National Institutes of Health patient information sites:     http://win.niddk.nih.gov/publications/index.htm

## 2023-03-20 ENCOUNTER — TELEPHONE (OUTPATIENT)
Dept: INTERNAL MEDICINE | Facility: CLINIC | Age: 78
End: 2023-03-20
Payer: COMMERCIAL

## 2023-03-20 NOTE — TELEPHONE ENCOUNTER
Received call from Frances with [employer].    She states that she needs the diagnoses/start date for metoprolol succinate ER (TOPROL XL) 50 MG 24 hr tablet for prior auth/tier exception.    Provided below:    Associated Diagnoses    Hypertension goal BP (blood pressure) < 140/90 [I10]  - Primary       Nonobstructive atherosclerosis of coronary artery [I25.10]             Started 6/2022.    No further action needed at this time.    ABBY FariasN KB  [facility]

## 2023-03-31 ENCOUNTER — TRANSFERRED RECORDS (OUTPATIENT)
Dept: HEALTH INFORMATION MANAGEMENT | Facility: CLINIC | Age: 78
End: 2023-03-31
Payer: COMMERCIAL

## 2023-04-09 ENCOUNTER — HEALTH MAINTENANCE LETTER (OUTPATIENT)
Age: 78
End: 2023-04-09

## 2023-04-10 NOTE — NURSING NOTE
1 year appointment reminder message was created and will be sent out 2 - 3 months prior to next appointment due date. [employer]

## 2023-05-05 ENCOUNTER — ALLIED HEALTH/NURSE VISIT (OUTPATIENT)
Dept: FAMILY MEDICINE | Facility: CLINIC | Age: 78
End: 2023-05-05
Payer: COMMERCIAL

## 2023-05-05 VITALS — DIASTOLIC BLOOD PRESSURE: 88 MMHG | SYSTOLIC BLOOD PRESSURE: 140 MMHG

## 2023-05-05 DIAGNOSIS — Z01.30 BP CHECK: Primary | ICD-10-CM

## 2023-05-05 PROCEDURE — 99207 PR NO CHARGE NURSE ONLY: CPT | Performed by: INTERNAL MEDICINE

## 2023-05-05 NOTE — Clinical Note
Routing message to PCP for review because BP checked at pharmacy is above goal. Recommended patient to follow-up with PCP.  PCP please close this encounter. 140/88

## 2023-05-05 NOTE — PROGRESS NOTES
Monika Serrano was evaluated at [facility] on May 5, 2023 at which time her blood pressure was:    BP Readings from Last 1 Encounters:   05/05/23 (!) 140/88     No data recorded      Reviewed lifestyle modifications for blood pressure control and reduction: including making healthy food choices, managing weight, getting regular exercise, smoking cessation, reducing alcohol consumption, monitoring blood pressure regularly.     Symptoms: None    BP Goal:< 140/90 mmHg    BP Assessment:  BP too high    Potential Reasons for BP too high: NA - Not applicable    BP Follow-Up Plan: Recheck BP in 30 days at pharmacy    Recommendation to Provider: pt will recheck in 30 days     Note completed by: Kiki Rudolph RPH

## 2023-05-31 ENCOUNTER — TELEPHONE (OUTPATIENT)
Dept: FAMILY MEDICINE | Facility: CLINIC | Age: 78
End: 2023-05-31
Payer: COMMERCIAL

## 2023-05-31 DIAGNOSIS — T16.9XXA FOREIGN BODY IN EAR, UNSPECIFIED LATERALITY, INITIAL ENCOUNTER: Primary | ICD-10-CM

## 2023-05-31 NOTE — TELEPHONE ENCOUNTER
Dr. Fair/Covering Provider     Patient call and stated her hearing aide is stuck in her eat. [facility] cannot remove as they are not allowed to. Pt requesting a provider in clinic to remove hearing aide. Should pt go to ENT? Needs urgent referral. Advise.       [phone], ok for detailed vm       Thanks,  KB Rodriguez  [facility]      The patient was contacted and informed that Dr. Sutton is not in clinic today but that a message would be sent requesting orders be placed to have the upper endo and colonoscopy if that is what the plan was. The patient verbalized understanding and will wait to hear back.    Renetta Jimenez CMA

## 2023-05-31 NOTE — TELEPHONE ENCOUNTER
Patient notified of Provider's message as written.  She stated that she has had the hearing aid stuck in her ear for a week, she will not go to  for this.   Patient stated that she has her own ENT she will go to as she is certain she can get in with them tomorrow.    Conner Willett RN  [facility]

## 2023-05-31 NOTE — TELEPHONE ENCOUNTER
ENT may not have same day slots but I did make a referral - find opening or urgent care   Orly Ivy MD

## 2023-06-01 ENCOUNTER — TELEPHONE (OUTPATIENT)
Dept: OTOLARYNGOLOGY | Facility: CLINIC | Age: 78
End: 2023-06-01
Payer: COMMERCIAL

## 2023-06-01 ENCOUNTER — TRANSFERRED RECORDS (OUTPATIENT)
Dept: INTERNAL MEDICINE | Facility: CLINIC | Age: 78
End: 2023-06-01
Payer: COMMERCIAL

## 2023-06-01 NOTE — TELEPHONE ENCOUNTER
[facility] Call [facility]    Phone Message    May a detailed message be left on voicemail: no     Reason for Call: Appointment Intake    Referring Provider Name: Orly Ivy MD  Diagnosis and/or Symptoms: Hearing aid stuck in ear    I called priority line to get directive for scheduling and there was no answer. This is an emergent referral and needs to be seen in the next 1-2 days.     Action Taken: Message routed to:  Other: [facility]    Travel Screening: Not Applicable

## 2023-06-01 NOTE — TELEPHONE ENCOUNTER
Called and spoke to . Patient got in with different ENT with a different system.     Irasema DE LUNA RN, [facility] 06/01/23 12:05 PM

## 2023-06-07 ENCOUNTER — ALLIED HEALTH/NURSE VISIT (OUTPATIENT)
Dept: FAMILY MEDICINE | Facility: CLINIC | Age: 78
End: 2023-06-07
Payer: COMMERCIAL

## 2023-06-07 VITALS — DIASTOLIC BLOOD PRESSURE: 70 MMHG | SYSTOLIC BLOOD PRESSURE: 130 MMHG

## 2023-06-07 DIAGNOSIS — Z01.30 BP CHECK: Primary | ICD-10-CM

## 2023-06-07 PROCEDURE — 99207 PR NO CHARGE NURSE ONLY: CPT | Performed by: INTERNAL MEDICINE

## 2023-06-07 NOTE — PROGRESS NOTES
Monika Serrano was evaluated at [facility] on June 7, 2023 at which time her blood pressure was:    BP Readings from Last 1 Encounters:   06/07/23 130/70     No data recorded      Reviewed lifestyle modifications for blood pressure control and reduction: including making healthy food choices, managing weight, getting regular exercise, smoking cessation, reducing alcohol consumption, monitoring blood pressure regularly.     Symptoms: None    BP Goal:< 140/90 mmHg    BP Assessment:  BP at goal    Potential Reasons for BP too high: NA - Not applicable    BP Follow-Up Plan: Recheck BP in 6 months at pharmacy    Recommendation to Provider: none    Note completed by: Kiki Rudolph RPH

## 2023-07-31 ENCOUNTER — TELEPHONE (OUTPATIENT)
Dept: SLEEP MEDICINE | Facility: CLINIC | Age: 78
End: 2023-07-31
Payer: COMMERCIAL

## 2023-07-31 DIAGNOSIS — R45.1 MOTOR RESTLESSNESS: Primary | ICD-10-CM

## 2023-07-31 DIAGNOSIS — F51.04 CHRONIC INSOMNIA: ICD-10-CM

## 2023-07-31 DIAGNOSIS — G47.33 OSA (OBSTRUCTIVE SLEEP APNEA): Chronic | ICD-10-CM

## 2023-07-31 NOTE — TELEPHONE ENCOUNTER
LOOKED AT THE SN OF THE HUMIDIFIER THAT PT GAVE ME AND SAW THAT IT IS A LOANER HUMIDIFIER THAT WE GAVE HER WHEN HERS WAS SENT IN. WILL CALL HER ON MONDAY AND SUGGEST SHE BRING THAT ONE BACK AND WE CAN ISSUE A DIFFERENT ONE. SN [id number]  M FOR PT TO BRING FAULTY HUMIDIFIER TO [facility] AND WE WILL ISSUE ANOTHER ONE. LEFT HUMIDIFER AND PAPERWORK AT  IN [facility].

## 2023-09-16 DIAGNOSIS — I10 HYPERTENSION GOAL BP (BLOOD PRESSURE) < 140/90: Chronic | ICD-10-CM

## 2023-09-16 DIAGNOSIS — I25.10 NONOBSTRUCTIVE ATHEROSCLEROSIS OF CORONARY ARTERY: ICD-10-CM

## 2023-09-18 RX ORDER — METOPROLOL SUCCINATE 50 MG/1
75 TABLET, EXTENDED RELEASE ORAL DAILY
Qty: 135 TABLET | Refills: 0 | Status: SHIPPED | OUTPATIENT
Start: 2023-09-18 | End: 2023-12-21

## 2023-11-01 ENCOUNTER — TELEPHONE (OUTPATIENT)
Dept: FAMILY MEDICINE | Facility: CLINIC | Age: 78
End: 2023-11-01
Payer: COMMERCIAL

## 2023-11-01 NOTE — TELEPHONE ENCOUNTER
I am aware of Eye movement desensitization and reprocessing (EMDR) therapy . I don't see a problem     I give my approval but it is a fine idea to also have cardiology  weigh in, I did not know that this therapy was going to trigger these kinds of alarms and I think it is entirely safe     Missael Fair MD

## 2023-11-01 NOTE — TELEPHONE ENCOUNTER
Cuca aClixto, patient's psychotherapist, called asking if patient is ok per cardiology standpoint to start EMDR therapy? Per therapist this treatment can occasionally cause increased heart rate.     I asked her to please reach out to Dr. Arce which is patients cardiologist. Dudley agreed to this. If you have any other advise please let us know and we can update her therapist.    Thanks,  KB Rodriguez  [facility]

## 2023-11-02 NOTE — TELEPHONE ENCOUNTER
Called Tabitha and gave her Missael Calixto's message as written below.  She verbalized understanding  She stated that she was mainly checking to ensure patient is healthy enough to proceed with EMDR therapy.    Conner Willett RN  [facility]

## 2023-11-08 ENCOUNTER — TELEPHONE (OUTPATIENT)
Dept: FAMILY MEDICINE | Facility: CLINIC | Age: 78
End: 2023-11-08
Payer: COMMERCIAL

## 2023-11-08 DIAGNOSIS — Z63.4 BEREAVEMENT: Primary | ICD-10-CM

## 2023-11-08 SDOH — SOCIAL STABILITY - SOCIAL INSECURITY: DISSAPEARANCE AND DEATH OF FAMILY MEMBER: Z63.4

## 2023-11-08 NOTE — TELEPHONE ENCOUNTER
Patient notified of Provider's message as written.  Patient verbalized understanding.    However, she is hesitant to take Ativan.  She is asking if there is an alternative that would not have the addictive potential?    Conner Willett RN  [facility]

## 2023-11-08 NOTE — TELEPHONE ENCOUNTER
Reason for Call: Request for an order or referral:    Order or referral being requested: Anxiety med     Date needed: as soon as possible    Has the patient been seen by the PCP for this problem? YES    Additional comments: Needs refill on anxiety medication, patients  passed away 2 days ago and patient is struggling without medication.     Phone number Patient can be reached at:  Cell number on file:    Telephone Information:   Mobile [phone]       Best Time:  Anytime    Can we leave a detailed message on this number?  YES    Call taken on 11/8/2023 at 4:08 PM by Angelina Lynn

## 2023-11-08 NOTE — TELEPHONE ENCOUNTER
Called patient.  Her spouse passed away 2 days ago and she has been having some anxiety.  She is having trouble with sleep and is short with her children    Patient would like to be on something to help with anxiety  Asked if she has tried anything in the past for anxiety.  She stated that she was on Zoloft in 2016 but did not want to be on this long term so she stopped.   Asked if she was looking for something to use PRN or daily.  She stated that she would like something that could be effective right away and help her sleep tonight but thinks she would need to take it daily during this time when she is grieving   Explained that PCP is not in the office today but sometimes may check messages and respond  Also explained that a virtual visit may be needed to discuss new treatment    Explained that she may need a virtual visit to discuss options.    Pharmacy- [facility] [address]    Conner Willett RN  [facility]

## 2023-11-08 NOTE — TELEPHONE ENCOUNTER
First of all I express my sorrow and grieve with her during this terrible time.    Then, for emergency use, for bereavement symptoms and insomnia, taking some Ativan (Lorazepam) is acceptable , this is however a sedative hypnotic drug , with a habit forming and addictive potential     She can have 15 pills. But this is not a viable option for long term use.    Please review with her, reroute for orders to be placed if she is on board with this plan and otherwise send more feedback    Missael Fair MD

## 2023-11-09 RX ORDER — BUSPIRONE HYDROCHLORIDE 5 MG/1
5 TABLET ORAL 3 TIMES DAILY PRN
Qty: 45 TABLET | Refills: 1 | Status: SHIPPED | OUTPATIENT
Start: 2023-11-09 | End: 2023-11-24

## 2023-11-09 NOTE — TELEPHONE ENCOUNTER
Detailed message left on patient's VM with note as written below.  Advised to call clinic back with any questions    Conner Willett RN  [facility]

## 2023-11-09 NOTE — TELEPHONE ENCOUNTER
"Patient called and states that she is waiting to talk to Dr. Fair about a potential prescription for Zoloft.     Writer reviewed the notes from documentation (and noted that a voice message was left), and relayed message to patient:    \"We could try busPIRone (BUSPAR)  5 milligrams up to 3 times a day prn for anxiety symptoms      Lets send an prescription for about 45 tabs [[ prescription is sent to the pharmacy ]     Missael Fair MD\"      Patient stating that she did not receive any voice message, then writer was not able to hear patient speaking at all (but phone still showing as connected). Maybe there was a connection issue. Then call disconnected.    Shari Ratliff RN   [facility]  "

## 2023-11-09 NOTE — TELEPHONE ENCOUNTER
We could try busPIRone (BUSPAR)  5 milligrams up to 3 times a day prn for anxiety symptoms     Lets send an prescription for about 45 tabs [[ prescription is sent to the pharmacy ]    Missael Fair MD

## 2023-11-16 ENCOUNTER — TELEPHONE (OUTPATIENT)
Dept: INTERNAL MEDICINE | Facility: CLINIC | Age: 78
End: 2023-11-16
Payer: COMMERCIAL

## 2023-11-17 ENCOUNTER — OFFICE VISIT (OUTPATIENT)
Dept: FAMILY MEDICINE | Facility: CLINIC | Age: 78
End: 2023-11-17
Payer: COMMERCIAL

## 2023-11-17 VITALS
DIASTOLIC BLOOD PRESSURE: 82 MMHG | OXYGEN SATURATION: 98 % | HEART RATE: 59 BPM | TEMPERATURE: 97.9 F | BODY MASS INDEX: 23.66 KG/M2 | SYSTOLIC BLOOD PRESSURE: 148 MMHG | WEIGHT: 125.2 LBS

## 2023-11-17 DIAGNOSIS — H61.21 IMPACTED CERUMEN OF RIGHT EAR: Primary | ICD-10-CM

## 2023-11-17 PROCEDURE — 69209 REMOVE IMPACTED EAR WAX UNI: CPT | Mod: RT | Performed by: FAMILY MEDICINE

## 2023-11-17 NOTE — PROGRESS NOTES
Assessment & Plan       ICD-10-CM    1. Impacted cerumen of right ear  H61.21 REMOVE IMPACTED CERUMEN       Cerumen removed with ear flush by MA without complication.       Review of external notes as documented elsewhere in note         There are no Patient Instructions on file for this visit.    Efrain Nuñez MD  [facility] JOSE Frank is a 78 year old, presenting for the following health issues:  Ear Problem      11/17/2023     3:06 PM   Additional Questions   Roomed by Albina   Accompanied by none         11/17/2023     3:06 PM   Patient Reported Additional Medications   Patient reports taking the following new medications none     Was seen by Audiology yesterday and was informed 1 ear is plugged    HPI               Review of Systems   Constitutional, HEENT, cardiovascular, pulmonary, gi and gu systems are negative, except as otherwise noted.      Objective    BP (!) 148/82   Pulse 59   Temp 97.9  F (36.6  C) (Oral)   Wt 56.8 kg (125 lb 3.2 oz)   LMP  (LMP Unknown)   SpO2 98%   BMI 23.66 kg/m    Body mass index is 23.66 kg/m .  Physical Exam  Vitals reviewed.   Constitutional:       General: She is not in acute distress.     Appearance: Normal appearance. She is well-developed.   HENT:      Head: Normocephalic and atraumatic.      Right Ear: External ear normal. There is impacted cerumen.      Left Ear: External ear normal. There is impacted cerumen.      Nose: Nose normal.   Eyes:      General: No scleral icterus.     Extraocular Movements: Extraocular movements intact.      Conjunctiva/sclera: Conjunctivae normal.   Cardiovascular:      Rate and Rhythm: Normal rate.   Pulmonary:      Effort: Pulmonary effort is normal.   Musculoskeletal:         General: No deformity. Normal range of motion.      Cervical back: Normal range of motion.   Skin:     General: Skin is warm and dry.      Findings: No rash.   Neurological:      Mental Status: She is alert and oriented  to person, place, and time. Mental status is at baseline.      Gait: Gait normal.   Psychiatric:         Behavior: Behavior normal.         Thought Content: Thought content normal.         Judgment: Judgment normal.

## 2023-11-22 ENCOUNTER — TELEPHONE (OUTPATIENT)
Dept: FAMILY MEDICINE | Facility: CLINIC | Age: 78
End: 2023-11-22
Payer: COMMERCIAL

## 2023-11-22 NOTE — TELEPHONE ENCOUNTER
Dr. Fair,    Patient stated her buspar is making her drowsy and not sleeping any better.   Just lost her  and has times where she is more anxiety but pt does not want to stay on it or continue to take it anymore. She has appt with you 11/30/23. Asking if she can taper of med before appt and how she should do this.       Thanks,  KB Rodriguez  [facility]

## 2023-11-22 NOTE — TELEPHONE ENCOUNTER
Reason for Call:  Appointment Request    Patient requesting this type of appt: Chronic Diease Management/Medication/Follow-Up    Requested provider: Missael Fair    Reason patient unable to be scheduled: Not within requested timeframe    When does patient want to be seen/preferred time: 3-7 days    Comments: Pt would like to be seen ASAP and would like to get off the medication    Could we send this information to you in [facility] or would you prefer to receive a phone call?:   Patient would prefer a phone call   Okay to leave a detailed message?: Yes at Cell number on file:    Telephone Information:   Mobile [phone]       Call taken on 11/22/2023 at 3:49 PM by Richi Selby

## 2023-11-24 NOTE — TELEPHONE ENCOUNTER
Called patient and notified her of message from Dr. Fair. She verbalized understanding and states that she thinks that she is just going to stop it then.     Shari Ratliff RN   [facility]

## 2023-11-24 NOTE — TELEPHONE ENCOUNTER
There is no harm in just stopping this medication     Will review her case and treatment options at the appointment in 6 days     Missael Fair MD

## 2023-11-30 ENCOUNTER — OFFICE VISIT (OUTPATIENT)
Dept: INTERNAL MEDICINE | Facility: CLINIC | Age: 78
End: 2023-11-30
Payer: COMMERCIAL

## 2023-11-30 VITALS
RESPIRATION RATE: 16 BRPM | HEART RATE: 67 BPM | TEMPERATURE: 97.2 F | SYSTOLIC BLOOD PRESSURE: 146 MMHG | OXYGEN SATURATION: 96 % | BODY MASS INDEX: 23.62 KG/M2 | DIASTOLIC BLOOD PRESSURE: 85 MMHG | WEIGHT: 125 LBS

## 2023-11-30 DIAGNOSIS — Z71.89 BEREAVEMENT COUNSELING: Primary | ICD-10-CM

## 2023-11-30 DIAGNOSIS — Z29.11 NEED FOR VACCINATION AGAINST RESPIRATORY SYNCYTIAL VIRUS: ICD-10-CM

## 2023-11-30 DIAGNOSIS — N18.31 STAGE 3A CHRONIC KIDNEY DISEASE (H): ICD-10-CM

## 2023-11-30 DIAGNOSIS — I10 HYPERTENSION GOAL BP (BLOOD PRESSURE) < 140/90: Chronic | ICD-10-CM

## 2023-11-30 PROCEDURE — 99214 OFFICE O/P EST MOD 30 MIN: CPT | Performed by: INTERNAL MEDICINE

## 2023-11-30 RX ORDER — RESPIRATORY SYNCYTIAL VIRUS VACCINE 120MCG/0.5
0.5 KIT INTRAMUSCULAR ONCE
Qty: 1 EACH | Refills: 0 | Status: CANCELLED | OUTPATIENT
Start: 2023-11-30 | End: 2023-11-30

## 2023-11-30 NOTE — PROGRESS NOTES
"  Assessment & Plan     Bereavement counseling  Patient is in today for follow up more or less at my request . Her  of 57 years of marriage passed away from complications of metastatic prostate cancer . Since then patient has had some smoldering anxiety and other symptoms consistent with grieving. She had been taking some busPIRone (BUSPAR)  but stopped this and when the questions arose about \" well should I take something else?\" This led to a sense of not understanding how patient is doing so we had today's appointment. Monika Serrano is a perez woman with a lot of life behind her. She has self-understanding and we reviewed her coping skills . She's in closer touch with her 2 children who are locals and also has a counseling psychologist  and has used Eye movement desensitization and reprocessing (EMDR) therapy to some extent. She's got a good support group and feels adequate benefit from the use of magnesium and Melatonin for sleep and it is her feeling that this all adequate. Ultimately, from today we conclude that there is no role for ANY medications to be added. We recommended a follow up appointment for Wellness physical exam in 3 months [ into February ]    Need for vaccination against respiratory syncytial virus  Discussed , informational material printed and given to patient     Stage 3a chronic kidney disease (H)  Reviewed diagnosis. This is a bit overeager of a diagnosis and her most recent previous basic metabolic panel showed a gfr [ glomerular filtration rate ] greater then 60. We agreed not to do any tests today but will wait until February 2024     Hypertension goal BP (blood pressure) < 140/90  As above , she feels the diuretic therapy is working and she does not appreciate side effects at this time     Review of the result(s) of each unique test - laboratory studies from 2/2023  Prescription drug management  28 minutes spent by me on the date of the encounter doing chart review, history and " exam, documentation and further activities per the note      Missael Fair MD  [facility] JOSE Frank is a 78 year old, presenting for the following health issues:  Recheck Medication (Buspar )        2023     3:06 PM   Additional Questions   Roomed by Albina   Accompanied by none       HPI     Bereavement symptoms ,  for 57 years. He  on  , he had a prostate cancer  that had not ever been never been cured and it gradually worsened  He had hospice care      We discussed current patient symptoms , she has some mood swings / anger outbursts   She feels stabilized her moderate.    She was using busPIRone (BUSPAR)  for a time but no more  Using magnesium and Melatonin adequate in helping with sleep.    No panic attacks, denies insomnia , and her children are supporting her , all live locally. One son and one daughter     No prior history of any diagnosis requiring medications for anxiety or depression . Her counseling psychologist  has apparently not intimated thata she needs to take any specific medications, nor to ask me about this.    Patient feels she was kept busy with attending to matters surrounding     She has a counseling psychologist  who has tried some Eye movement desensitization and reprocessing (EMDR) therapy     BP Readings from Last 6 Encounters:   23 (!) 146/85   23 (!) 148/82   23 130/70   23 (!) 140/88   23 (!) 164/98   23 (!) 160/70     We discussed chronic kidney disease stage 3     We discussed possibly doing some recheck tests but we will       Review of Systems   Constitutional, HEENT, cardiovascular, pulmonary, gi and gu systems are negative, except as otherwise noted.      Objective    BP (!) 146/85   Pulse 67   Temp 97.2  F (36.2  C) (Tympanic)   Resp 16   Wt 56.7 kg (125 lb)   LMP  (LMP Unknown)   SpO2 96%   BMI 23.62 kg/m    Body mass index is 23.62 kg/m .  Physical Exam   GENERAL:  healthy, alert and no distress  EYES: Eyes grossly normal to inspection, PERRL and conjunctivae and sclerae normal  MS: no gross musculoskeletal defects noted, no edema  NEURO: Normal strength and tone, mentation intact and speech normal  PSYCH: mentation appears normal, affect normal/bright    No orders of the defined types were placed in this encounter.

## 2023-12-21 DIAGNOSIS — I10 HYPERTENSION GOAL BP (BLOOD PRESSURE) < 140/90: Chronic | ICD-10-CM

## 2023-12-21 DIAGNOSIS — I25.10 NONOBSTRUCTIVE ATHEROSCLEROSIS OF CORONARY ARTERY: ICD-10-CM

## 2023-12-21 RX ORDER — METOPROLOL SUCCINATE 50 MG/1
75 TABLET, EXTENDED RELEASE ORAL DAILY
Qty: 135 TABLET | Refills: 1 | Status: SHIPPED | OUTPATIENT
Start: 2023-12-21 | End: 2024-02-23

## 2024-01-02 ENCOUNTER — TELEPHONE (OUTPATIENT)
Dept: DERMATOLOGY | Facility: CLINIC | Age: 79
End: 2024-01-02
Payer: COMMERCIAL

## 2024-01-02 NOTE — TELEPHONE ENCOUNTER
Patient Contact    Attempt # 1    Was call answered?  No  Left message on answering machine for patient to call back.    (We can add her to 02/15/23 at 9:45 am)    Thank you,    Edwina CLARKRN BSN  [facility]- [phone]

## 2024-01-02 NOTE — TELEPHONE ENCOUNTER
[facility] Call Center    Phone Message    May a detailed message be left on voicemail: yes     Reason for Call: Other: Pt had to cancel her appt. On 01/04/24 due to getting COVID. Next available is not until August. Pt was worked in for this January appt. Wondering if there is any way for this to be done again. Please call pt to discuss.      Action Taken: Other: derm    Travel Screening: Not Applicable

## 2024-01-02 NOTE — TELEPHONE ENCOUNTER
Appointment changed    Thank you,    Edwina CLARK,RN BSN  [facility]- [phone]

## 2024-01-03 ENCOUNTER — NURSE TRIAGE (OUTPATIENT)
Dept: NURSING | Facility: CLINIC | Age: 79
End: 2024-01-03
Payer: COMMERCIAL

## 2024-01-03 NOTE — TELEPHONE ENCOUNTER
COVID Positive/Requesting COVID treatment    Patient is positive for COVID and requesting treatment options.    Date of positive COVID test (PCR or at home)? 1/1/24  Current COVID symptoms: cough, muscle or body aches, sore throat, and congestion or runny nose  Date COVID symptoms began: 12/31/23    Message should be routed to clinic RN pool. Best phone number to use for call back: [phone]    Nurse Triage SBAR    Is this a 2nd Level Triage? YES, LICENSED PRACTITIONER REVIEW IS REQUIRED    Situation: COVID +     Background: Reports she tested + for COVID on 1/1/24. State's symptoms started on 12/31/23. Interested in Paxlovid.     Assessment: C/o congestion, sore throat, body aches, chronic cough, and had the sweats overnight but states those are gone. Reports she feels a little better today.    Protocol Recommended Disposition:   Call PCP Within 24 Hours    Recommendation: 2LT. Routed to PCP clinic for Paxlovid request. Protocol and care advice reviewed. Advised to call back with any new or worsening signs, symptoms, concerns, or questions. They verbalized understanding and agreed to follow advice given.       Routed to provider    Does the patient meet one of the following criteria for ADS visit consideration? 16+ years old, with an [facility] PCP     TIP  Providers, please consider if this condition is appropriate for management at one of our Acute and Diagnostic Services sites.     If patient is a good candidate, please use dotphrase <dot>triageresponse and select Refer to ADS to document.    Reason for Disposition   [1] HIGH RISK patient (e.g., weak immune system, age > 64 years, obesity with BMI 30 or higher, pregnant, chronic lung disease or other chronic medical condition) AND [2] COVID symptoms (e.g., cough, fever)  (Exceptions: Already seen by PCP and no new or worsening symptoms.)    Additional Information   Negative: SEVERE difficulty breathing (e.g., struggling for each breath, speaks in single words)    Negative: Difficult to awaken or acting confused (e.g., disoriented, slurred speech)   Negative: Bluish (or gray) lips or face now   Negative: Shock suspected (e.g., cold/pale/clammy skin, too weak to stand, low BP, rapid pulse)   Negative: Sounds like a life-threatening emergency to the triager   Negative: SEVERE or constant chest pain or pressure  (Exception: Mild central chest pain, present only when coughing.)   Negative: MODERATE difficulty breathing (e.g., speaks in phrases, SOB even at rest, pulse 100-120)   Negative: [1] Headache AND [2] stiff neck (can't touch chin to chest)   Negative: Oxygen level (e.g., pulse oximetry) 90 percent or lower   Negative: Chest pain or pressure  (Exception: MILD central chest pain, present only when coughing.)   Negative: [1] Drinking very little AND [2] dehydration suspected (e.g., no urine > 12 hours, very dry mouth, very lightheaded)   Negative: Patient sounds very sick or weak to the triager   Negative: MILD difficulty breathing (e.g., minimal/no SOB at rest, SOB with walking, pulse <100)   Negative: Fever > 103 F (39.4 C)   Negative: [1] Fever > 101 F (38.3 C) AND [2] age > 60 years   Negative: [1] Fever > 100.0 F (37.8 C) AND [2] bedridden (e.g., CVA, chronic illness, recovering from surgery)   Negative: Oxygen level (e.g., pulse oximetry) 91 to 94 percent    Protocols used: Coronavirus (COVID-19) Diagnosed or [id number]    Yanely Brady RN on 1/3/2024 at 5:51 PM

## 2024-01-04 NOTE — TELEPHONE ENCOUNTER
"Patient declined treatment at this time, stating to writer that, \" I am on with other medication with my acupuncture MD, which seems to be working very good for me. I prefer to opt out for Paxlovid treatment for now.\" David Irene RN.  "

## 2024-01-09 ENCOUNTER — ALLIED HEALTH/NURSE VISIT (OUTPATIENT)
Dept: FAMILY MEDICINE | Facility: CLINIC | Age: 79
End: 2024-01-09
Payer: COMMERCIAL

## 2024-01-09 DIAGNOSIS — Z01.30 BP CHECK: Primary | ICD-10-CM

## 2024-01-09 PROCEDURE — 99207 PR NO CHARGE NURSE ONLY: CPT | Performed by: INTERNAL MEDICINE

## 2024-01-09 NOTE — PROGRESS NOTES
Monika Serrnao was evaluated at [facility] on January 9, 2024 at which time her blood pressure was:    BP Readings from Last 1 Encounters:   11/30/23 (!) 146/85     Systolic (Patient Reported): 131 (1/2/2024  1:05 PM)  Diastolic (Patient Reported): 68 (1/2/2024  1:05 PM)        Reviewed lifestyle modifications for blood pressure control and reduction: including making healthy food choices, managing weight, getting regular exercise, smoking cessation, reducing alcohol consumption, monitoring blood pressure regularly.     Symptoms: None    BP Goal:< 140/90 mmHg    BP Assessment:  BP at goal    Potential Reasons for BP too high: NA - Not applicable    BP Follow-Up Plan: Recheck BP in 30 days at pharmacy    Recommendation to Provider: none     Note completed by: Kiki Rudolph RPH

## 2024-02-15 ENCOUNTER — OFFICE VISIT (OUTPATIENT)
Dept: DERMATOLOGY | Facility: CLINIC | Age: 79
End: 2024-02-15
Payer: COMMERCIAL

## 2024-02-15 DIAGNOSIS — L82.1 SEBORRHEIC KERATOSES: ICD-10-CM

## 2024-02-15 DIAGNOSIS — D48.9 NEOPLASM OF UNCERTAIN BEHAVIOR: ICD-10-CM

## 2024-02-15 DIAGNOSIS — L57.0 AK (ACTINIC KERATOSIS): ICD-10-CM

## 2024-02-15 DIAGNOSIS — Z12.83 ENCOUNTER FOR SCREENING FOR MALIGNANT NEOPLASM OF SKIN: ICD-10-CM

## 2024-02-15 DIAGNOSIS — L81.4 LENTIGINES: ICD-10-CM

## 2024-02-15 DIAGNOSIS — D22.9 MULTIPLE BENIGN NEVI: Primary | ICD-10-CM

## 2024-02-15 DIAGNOSIS — D18.01 CHERRY ANGIOMA: ICD-10-CM

## 2024-02-15 PROCEDURE — 11102 TANGNTL BX SKIN SINGLE LES: CPT | Performed by: NURSE PRACTITIONER

## 2024-02-15 PROCEDURE — 17000 DESTRUCT PREMALG LESION: CPT | Mod: XS | Performed by: NURSE PRACTITIONER

## 2024-02-15 PROCEDURE — 99203 OFFICE O/P NEW LOW 30 MIN: CPT | Mod: 25 | Performed by: NURSE PRACTITIONER

## 2024-02-15 PROCEDURE — 88305 TISSUE EXAM BY PATHOLOGIST: CPT | Performed by: DERMATOLOGY

## 2024-02-15 NOTE — LETTER
2/15/2024         RE: Monika Serrano  [address] [facility] [address]        Dear Colleague,    Thank you for referring your patient, Monika Serrano, to the [facility]. Please see a copy of my visit note below.    [facility] Dermatology Note  Encounter Date: Feb 15, 2024  Office Visit     Reviewed patients past medical history and pertinent chart review prior to patients visit today.     Dermatology Problem List:  NUB left lateral knee, shave biopsy 02/15/24 .   AK dorsum nose, cryo 2/15/2024     Patient denies personal history of skin cancer or dysplastic nevi.    Patient denies family history of skin cancer or dysplastic nevi.      ____________________________________________    Assessment & Plan:     # Neoplasm of uncertain behavior:  left lateral knee  DDx includes BCC vs SCC. Shave biopsy today.    Procedure Note: Biopsy by shave technique  The risks and benefits of the procedure were described to the patient. These include but are not limited to bleeding, infection, scar, incomplete removal, and non-diagnostic biopsy. Verbal informed consent was obtained. The above site(s) was cleansed with an alcohol pad and injected with 1% lidocaine with epinephrine. Once anesthesia was obtained, a biopsy(ies) was performed with Gilette blade. The tissue(s) was placed in a labeled container(s) with formalin and sent to pathology. Hemostasis was achieved with aluminum chloride. Vaseline and a bandage were applied to the wound(s). The patient tolerated the procedure well and was given post biopsy care instructions.     # Actinic keratosis, dorsum nose. Premalignant nature discussed with patient. Treatment options discussed with patient today including no treatment, topical treatment, and cryotherapy. Patient elects to treat visible lesions today with cryotherapy. After verbal consent and discussion of risks and benefits including but no limited to  dyspigmentation/scar, blister, and pain. A total of 1 actinic keratoses were treated with 1-2mm freeze border for 2 cycle with liquid nitrogen. Post cryotherapy instructions were provided.     # Benign skin findings including: seborrheic keratoses, cherry angioma, lentigines and benign nevi.   - No further intervention required. Patient to report changes.   - Patient reassured of the benign nature of these lesions.    #Signs and Symptoms of non-melanoma skin cancer and ABCDEs of melanoma reviewed with patient. Patient encouraged to perform monthly self skin exams and educated on how to perform them. UV precautions reviewed with patient. Patient was asked about new or changing moles/lesions on body.     #Reviewed Sunscreen: Apply 20 minutes prior to going outdoors and reapply every two hours, when wet or sweating. We recommend using an SPF 30 or higher, and to use one that is water resistant.       Follow-up:  1-2 years for follow up full body skin exam, prn for new or changing lesions or new concerns    Erika Sy, [facility]  Dermatology     ____________________________________________    CC: Skin Check (Rough spot to right knee that has been present for years with resent change with redness and roughness)    HPI:  Ms. Monika Serrano is a(n) 78 year old female who presents today as a new patient for a full body skin cancer screening. Patient has concerns today about a spot on the left knee that has been present for at least a year or more.  It is rough in texture and sometimes more reddened than others.  It never goes away.  She also has noticed some dry skin on the top of her nose more recently..     Patient is otherwise feeling well, without additional skin concerns.     Physical Exam:  Vitals: LMP  (LMP Unknown)   SKIN: Total skin excluding the genitalia areas was performed. The exam included the head/face, neck, both arms, chest, back, abdomen, both legs, digits, mons pubis, buttock and nails.   -Left lateral knee,  about a 1 cm pink scaly annular plaque  -Dorsum nose, pink ill-defined scaly papule  -several 1-2mm red dome shaped symmetric papules scattered on the trunk  -multiple tan/brown flat round macules and raised papules scattered throughout trunk, extremities and head. No worrisome features for malignancy noted on examination.  -scattered tan, homogenous macules scattered on sun exposed areas of trunk, extremities and face.   -scattered waxy, stuck on tan/brown papules and patches on the trunk     - No other lesions of concern on areas examined.     Medications:  Current Outpatient Medications   Medication     cetirizine (ZYRTEC) 10 MG tablet     hydrochlorothiazide (HYDRODIURIL) 12.5 MG tablet     losartan (COZAAR) 100 MG tablet     metoprolol succinate ER (TOPROL XL) 50 MG 24 hr tablet     NEW MED     ORDER FOR DME     rosuvastatin (CRESTOR) 10 MG tablet     UNABLE TO FIND     UNABLE TO FIND     UNABLE TO FIND     No current facility-administered medications for this visit.      Past Medical History:   Patient Active Problem List   Diagnosis     Fibromyalgia     Osteopenia     HAM (obstructive sleep apnea)- milld-moderate     Chronic insomnia     Hyperlipidemia LDL goal <40     zAdvanced directives, counseling/discussion     S/P aortic valve replacement     Coronary artery disease involving native coronary artery of native heart with angina pectoris (H24)     Motor restlessness     Combined forms of age-related cataract, mild, of left eye     Combined forms of age-related cataract, mild-mod, of right eye     Stable branch retinal vein occlusion of right eye (H28)     Stage 3a chronic kidney disease (H)     Hypertension goal BP (blood pressure) < 140/90     Sensorineural hearing loss, unilateral     Prophylactic antibiotic     Past Medical History:   Diagnosis Date     Acute respiratory failure (H) 12/8/2016     Allergic rhinitis      Anxiety 12/28/2016     Benign positional vertigo      Cataract 7/18/2011     Utility  update for deleted IMO code [employer] Update utility     Coronary artery disease involving native coronary artery of native heart with angina pectoris (H24) 1/9/2017    Severe aortic stenosis, planned AVR, pre-operative angiogram: The left main artery has minimal disease. the LAD has mild to moderate disease. The circumflex artery has minimal disease, co-dominant. RCA has moderate disease, co-dominant     Fibromyalgia 5/6/2010     Fracture of metatarsal Left    5th     Hyperlipidemia LDL goal <40      Hypertension goal BP (blood pressure) < 140/90 9/17/2019     HAM (obstructive sleep apnea)- milld-moderate     Sleep study 2002- GRULLON 25, low O2 91%. MSLT 8.4, no SOREMs Sleep study 2003- AHI 12.1, RDI 24, Lo2 91%. CPAP 10cm effective.  Sleep study on 5/22/13 (111#) - AHI 11.1, supine AHI 30.7, REM AHI 48, desaturations down to 84%, 1.8 minutes <+ 90%, RDI 41.2. REM AHI 48, consistent with excessive REM HAM. Periodic Limb Movement Index 0/hour.      Sensorineural hearing loss, unilateral 7/11/2022     Stage 3a chronic kidney disease (H) 8/15/2019     Urge incontinence      Uterine hyperplasia        CC Referred Self, MD  No address on file on close of this encounter.      Again, thank you for allowing me to participate in the care of your patient.        Sincerely,        GONZALEZ Romero CNP

## 2024-02-15 NOTE — PATIENT INSTRUCTIONS
Wound Care Instructions     FOR SUPERFICIAL WOUNDS     [facility], [facility] or    [facility] [phone]          AFTER 24 HOURS YOU SHOULD REMOVE THE BANDAGE AND BEGIN DAILY DRESSING CHANGES AS FOLLOWS:     1) Remove Dressing.     2) Clean and dry the area with tap water using a Q-tip or sterile gauze pad.     3) Apply Vaseline, Aquaphor, Polysporin ointment or Bacitracin ointment over entire wound.  Do NOT use Neosporin ointment.     4) Cover the wound with a band-aid, or a sterile non-stick gauze pad and micropore paper tape      REPEAT THESE INSTRUCTIONS AT LEAST ONCE A DAY UNTIL THE WOUND HAS COMPLETELY HEALED.    It is an old wives tale that a wound heals better when it is exposed to air and allowed to dry out. The wound will heal faster with a better cosmetic result if it is kept moist with ointment and covered with a bandage.    **Do not let the wound dry out.**      Supplies Needed:      *Cotton tipped applicators (Q-tips)    *Polysporin Ointment or Bacitracin Ointment (NOT NEOSPORIN)    *Band-aids or non-stick gauze pads and micropore paper tape.      PATIENT INFORMATION:    During the healing process you will notice a number of changes. All wounds develop a small halo of redness surrounding the wound.  This means healing is occurring. Severe itching with extensive redness usually indicates sensitivity to the ointment or bandage tape used to dress the wound.  You should call our office if this develops.      Swelling  and/or discoloration around your surgical site is common, particularly when performed around the eye.    All wounds normally drain.  The larger the wound the more drainage there will be.  After 7-10 days, you will notice the wound beginning to shrink and new skin will begin to grow.  The wound is healed when you can see skin has formed over the entire area.  A healed wound has a healthy, shiny look to the surface and is red to dark pink in color to normalize.   Wounds may take approximately 4-6 weeks to heal.  Larger wounds may take 6-8 weeks.  After the wound is healed you may discontinue dressing changes.    You may experience a sensation of tightness as your wound heals. This is normal and will gradually subside.    Your healed wound may be sensitive to temperature changes. This sensitivity improves with time, but if you re having a lot of discomfort, try to avoid temperature extremes.    Patients frequently experience itching after their wound appears to have healed because of the continue healing under the skin.  Plain Vaseline will help relieve the itching.        POSSIBLE COMPLICATIONS    BLEEDING:    Leave the bandage in place.  Use tightly rolled up gauze or a cloth to apply direct pressure over the bandage for 30  minutes.  Reapply pressure for an additional 30 minutes if necessary  Use additional gauze and tape to maintain pressure once the bleeding has stopped.         WOUND CARE INSTRUCTIONS  FOR CRYOSURGERY  For questions please call [phone]        This area treated with liquid nitrogen will form a blister. You do not need to bandage the area until after the blister forms and breaks (which may be a few days).  When the blister breaks, begin daily dressing changes as follows:    1) Clean and dry the area with tap water using clean Q-tip or sterile gauze pad.    2) Apply Vaseline or Aquaphor over entire wound. Other options include Polysporin ointment or Bacitracin ointment over entire wound.  Do NOT use Neosporin ointment.    3) Cover the wound with a band-aid or sterile non-stick gauze pad and micropore paper tape.      REPEAT THESE INSTRUCTIONS AT LEAST ONCE A DAY UNTIL THE WOUND HAS COMPLETELY HEALED.        It is an old wives tale that a wound heals better when it is exposed to air and allowed to dry out. The wound will heal faster with a better cosmetic result if it is kept moist with ointment and covered with a bandage.  Do not let the wound dry  out.      Supplies Needed:     *Cotton tipped applicators (Q-tips)   *Polysporin ointment or Bacitracin ointment (NOT NEOSPORIN)   *Band-aids, or non stick gauze pads and micropore paper tape    PATIENT INFORMATION    During the healing process you will notice a number of changes. All wounds develop a small halo of redness surrounding the wound.  This means healing is occurring. Severe itching with extensive redness usually indicates sensitivity to the ointment or bandage tape used to dress the wound.  You should call our office if this develops.      Swelling and/or discoloration around your surgical site is common, particularly when performed around the eye.    All wounds normally drain.  The larger the wound the more drainage there will be.  After 7-10 days, you will notice the wound beginning to shrink and new skin will begin to grow.  The wound is healed when you can see skin has formed over the entire area.  A healed wound has a healthy, shiny look to the surface and is red to dark pink in color to normalize.  Wounds may take approximately 4-6 weeks to heal.  Larger wounds may take 6-8 weeks.  After the wound is healed you may discontinue dressing changes.    You may experience a sensation of tightness as your wound heals. This is normal and will gradually subside.    Your healed wound may be sensitive to temperature changes. This sensitivity improves with time, but if you re having a lot of discomfort, try to avoid temperature extremes.    Patients frequently experience itching after their wound appears to have healed because of the continue healing under the skin.  Plain Vaseline will help relieve the itching.

## 2024-02-15 NOTE — PROGRESS NOTES
[facility] Dermatology Note  Encounter Date: Feb 15, 2024  Office Visit     Reviewed patients past medical history and pertinent chart review prior to patients visit today.     Dermatology Problem List:  NUB left lateral knee, shave biopsy 02/15/24 .   AK dorsum nose, cryo 2/15/2024     Patient denies personal history of skin cancer or dysplastic nevi.    Patient denies family history of skin cancer or dysplastic nevi.      ____________________________________________    Assessment & Plan:     # Neoplasm of uncertain behavior:  left lateral knee  DDx includes BCC vs SCC. Shave biopsy today.    Procedure Note: Biopsy by shave technique  The risks and benefits of the procedure were described to the patient. These include but are not limited to bleeding, infection, scar, incomplete removal, and non-diagnostic biopsy. Verbal informed consent was obtained. The above site(s) was cleansed with an alcohol pad and injected with 1% lidocaine with epinephrine. Once anesthesia was obtained, a biopsy(ies) was performed with Gilette blade. The tissue(s) was placed in a labeled container(s) with formalin and sent to pathology. Hemostasis was achieved with aluminum chloride. Vaseline and a bandage were applied to the wound(s). The patient tolerated the procedure well and was given post biopsy care instructions.     # Actinic keratosis, dorsum nose. Premalignant nature discussed with patient. Treatment options discussed with patient today including no treatment, topical treatment, and cryotherapy. Patient elects to treat visible lesions today with cryotherapy. After verbal consent and discussion of risks and benefits including but no limited to dyspigmentation/scar, blister, and pain. A total of 1 actinic keratoses were treated with 1-2mm freeze border for 2 cycle with liquid nitrogen. Post cryotherapy instructions were provided.     # Benign skin findings including: seborrheic keratoses, cherry angioma,  lentigines and benign nevi.   - No further intervention required. Patient to report changes.   - Patient reassured of the benign nature of these lesions.    #Signs and Symptoms of non-melanoma skin cancer and ABCDEs of melanoma reviewed with patient. Patient encouraged to perform monthly self skin exams and educated on how to perform them. UV precautions reviewed with patient. Patient was asked about new or changing moles/lesions on body.     #Reviewed Sunscreen: Apply 20 minutes prior to going outdoors and reapply every two hours, when wet or sweating. We recommend using an SPF 30 or higher, and to use one that is water resistant.       Follow-up:  1-2 years for follow up full body skin exam, prn for new or changing lesions or new concerns    Erika Sy, [facility]  Dermatology     ____________________________________________    CC: Skin Check (Rough spot to right knee that has been present for years with resent change with redness and roughness)    HPI:  Ms. Monika Serrano is a(n) 78 year old female who presents today as a new patient for a full body skin cancer screening. Patient has concerns today about a spot on the left knee that has been present for at least a year or more.  It is rough in texture and sometimes more reddened than others.  It never goes away.  She also has noticed some dry skin on the top of her nose more recently..     Patient is otherwise feeling well, without additional skin concerns.     Physical Exam:  Vitals: LMP  (LMP Unknown)   SKIN: Total skin excluding the genitalia areas was performed. The exam included the head/face, neck, both arms, chest, back, abdomen, both legs, digits, mons pubis, buttock and nails.   -Left lateral knee, about a 1 cm pink scaly annular plaque  -Dorsum nose, pink ill-defined scaly papule  -several 1-2mm red dome shaped symmetric papules scattered on the trunk  -multiple tan/brown flat round macules and raised papules scattered throughout trunk, extremities and head.  No worrisome features for malignancy noted on examination.  -scattered tan, homogenous macules scattered on sun exposed areas of trunk, extremities and face.   -scattered waxy, stuck on tan/brown papules and patches on the trunk     - No other lesions of concern on areas examined.     Medications:  Current Outpatient Medications   Medication    cetirizine (ZYRTEC) 10 MG tablet    hydrochlorothiazide (HYDRODIURIL) 12.5 MG tablet    losartan (COZAAR) 100 MG tablet    metoprolol succinate ER (TOPROL XL) 50 MG 24 hr tablet    NEW MED    ORDER FOR DME    rosuvastatin (CRESTOR) 10 MG tablet    UNABLE TO FIND    UNABLE TO FIND    UNABLE TO FIND     No current facility-administered medications for this visit.      Past Medical History:   Patient Active Problem List   Diagnosis    Fibromyalgia    Osteopenia    HAM (obstructive sleep apnea)- milld-moderate    Chronic insomnia    Hyperlipidemia LDL goal <40    zAdvanced directives, counseling/discussion    S/P aortic valve replacement    Coronary artery disease involving native coronary artery of native heart with angina pectoris (H24)    Motor restlessness    Combined forms of age-related cataract, mild, of left eye    Combined forms of age-related cataract, mild-mod, of right eye    Stable branch retinal vein occlusion of right eye (H28)    Stage 3a chronic kidney disease (H)    Hypertension goal BP (blood pressure) < 140/90    Sensorineural hearing loss, unilateral    Prophylactic antibiotic     Past Medical History:   Diagnosis Date    Acute respiratory failure (H) 12/8/2016    Allergic rhinitis     Anxiety 12/28/2016    Benign positional vertigo     Cataract 7/18/2011     Utility update for deleted IMO code [employer] Update utility    Coronary artery disease involving native coronary artery of native heart with angina pectoris (H24) 1/9/2017    Severe aortic stenosis, planned AVR, pre-operative angiogram: The left main artery has minimal disease. the LAD has mild to moderate  disease. The circumflex artery has minimal disease, co-dominant. RCA has moderate disease, co-dominant    Fibromyalgia 5/6/2010    Fracture of metatarsal Left    5th    Hyperlipidemia LDL goal <40     Hypertension goal BP (blood pressure) < 140/90 9/17/2019    HAM (obstructive sleep apnea)- milld-moderate     Sleep study 2002- GRULLON 25, low O2 91%. MSLT 8.4, no SOREMs Sleep study 2003- AHI 12.1, RDI 24, Lo2 91%. CPAP 10cm effective.  Sleep study on 5/22/13 (111#) - AHI 11.1, supine AHI 30.7, REM AHI 48, desaturations down to 84%, 1.8 minutes <+ 90%, RDI 41.2. REM AHI 48, consistent with excessive REM HAM. Periodic Limb Movement Index 0/hour.     Sensorineural hearing loss, unilateral 7/11/2022    Stage 3a chronic kidney disease (H) 8/15/2019    Urge incontinence     Uterine hyperplasia        CC Referred Self, MD  No address on file on close of this encounter.

## 2024-02-16 LAB
PATH REPORT.COMMENTS IMP SPEC: ABNORMAL
PATH REPORT.COMMENTS IMP SPEC: ABNORMAL
PATH REPORT.COMMENTS IMP SPEC: YES
PATH REPORT.FINAL DX SPEC: ABNORMAL
PATH REPORT.GROSS SPEC: ABNORMAL
PATH REPORT.MICROSCOPIC SPEC OTHER STN: ABNORMAL
PATH REPORT.RELEVANT HX SPEC: ABNORMAL

## 2024-02-21 ENCOUNTER — TELEPHONE (OUTPATIENT)
Dept: DERMATOLOGY | Facility: CLINIC | Age: 79
End: 2024-02-21
Payer: COMMERCIAL

## 2024-02-21 NOTE — TELEPHONE ENCOUNTER
----- Message from GONZALEZ Benton CNP sent at 2/21/2024 12:18 PM CST -----  Please call the patient and discuss options of ED&C versus excision for treatment of skin cancer.  Schedule ED&C with me or excision with dermatology surgery.    Final Diagnosis   Left lateral knee:  - Basal cell carcinoma, superficial type - (see description)

## 2024-02-21 NOTE — TELEPHONE ENCOUNTER
Patient Contact    Attempt # 1    Was call answered?  No    Left message on answering machine for patient to call back.    Bebe RASMUSSEN RN  Dermatology   [phone]

## 2024-02-21 NOTE — TELEPHONE ENCOUNTER
Pt called back, told results. discussed ED&C vs excision. Pt wants to think about this and asked that we call back on Thursday, 2/22, towards the end of the day so she can think of her options.    Thank you,  Bebe RASMUSSEN RN  Dermatology   [phone]

## 2024-02-22 ENCOUNTER — MYC MEDICAL ADVICE (OUTPATIENT)
Dept: DERMATOLOGY | Facility: CLINIC | Age: 79
End: 2024-02-22
Payer: COMMERCIAL

## 2024-02-23 ENCOUNTER — OFFICE VISIT (OUTPATIENT)
Dept: DERMATOLOGY | Facility: CLINIC | Age: 79
End: 2024-02-23
Payer: COMMERCIAL

## 2024-02-23 ENCOUNTER — OFFICE VISIT (OUTPATIENT)
Dept: INTERNAL MEDICINE | Facility: CLINIC | Age: 79
End: 2024-02-23
Payer: COMMERCIAL

## 2024-02-23 VITALS
TEMPERATURE: 97.8 F | OXYGEN SATURATION: 95 % | BODY MASS INDEX: 23.79 KG/M2 | WEIGHT: 126 LBS | DIASTOLIC BLOOD PRESSURE: 92 MMHG | HEIGHT: 61 IN | RESPIRATION RATE: 16 BRPM | HEART RATE: 76 BPM | SYSTOLIC BLOOD PRESSURE: 183 MMHG

## 2024-02-23 DIAGNOSIS — Z00.00 WELLNESS EXAMINATION: Primary | ICD-10-CM

## 2024-02-23 DIAGNOSIS — N18.31 STAGE 3A CHRONIC KIDNEY DISEASE (H): ICD-10-CM

## 2024-02-23 DIAGNOSIS — C44.719 BCC (BASAL CELL CARCINOMA), LEG, LEFT: Primary | ICD-10-CM

## 2024-02-23 DIAGNOSIS — I25.10 NONOBSTRUCTIVE ATHEROSCLEROSIS OF CORONARY ARTERY: ICD-10-CM

## 2024-02-23 DIAGNOSIS — Z29.11 NEED FOR VACCINATION AGAINST RESPIRATORY SYNCYTIAL VIRUS: ICD-10-CM

## 2024-02-23 DIAGNOSIS — I10 HYPERTENSION GOAL BP (BLOOD PRESSURE) < 140/90: Chronic | ICD-10-CM

## 2024-02-23 DIAGNOSIS — H34.8312 STABLE BRANCH RETINAL VEIN OCCLUSION OF RIGHT EYE (H): ICD-10-CM

## 2024-02-23 DIAGNOSIS — Z78.0 ASYMPTOMATIC MENOPAUSAL STATE: ICD-10-CM

## 2024-02-23 DIAGNOSIS — I25.119 CORONARY ARTERY DISEASE INVOLVING NATIVE CORONARY ARTERY OF NATIVE HEART WITH ANGINA PECTORIS (H): ICD-10-CM

## 2024-02-23 LAB
ALBUMIN UR-MCNC: NEGATIVE MG/DL
ANION GAP SERPL CALCULATED.3IONS-SCNC: 11 MMOL/L (ref 7–15)
APPEARANCE UR: CLEAR
BILIRUB UR QL STRIP: NEGATIVE
BUN SERPL-MCNC: 22.7 MG/DL (ref 8–23)
CALCIUM SERPL-MCNC: 9.9 MG/DL (ref 8.8–10.2)
CHLORIDE SERPL-SCNC: 103 MMOL/L (ref 98–107)
CHOLEST SERPL-MCNC: 246 MG/DL
COLOR UR AUTO: YELLOW
CREAT SERPL-MCNC: 1.22 MG/DL (ref 0.51–0.95)
CREAT UR-MCNC: 26.4 MG/DL
DEPRECATED HCO3 PLAS-SCNC: 24 MMOL/L (ref 22–29)
EGFRCR SERPLBLD CKD-EPI 2021: 45 ML/MIN/1.73M2
FASTING STATUS PATIENT QL REPORTED: ABNORMAL
GLUCOSE SERPL-MCNC: 101 MG/DL (ref 70–99)
GLUCOSE UR STRIP-MCNC: NEGATIVE MG/DL
HDLC SERPL-MCNC: 58 MG/DL
HGB BLD-MCNC: 14 G/DL (ref 11.7–15.7)
HGB UR QL STRIP: NEGATIVE
KETONES UR STRIP-MCNC: NEGATIVE MG/DL
LDLC SERPL CALC-MCNC: 167 MG/DL
LEUKOCYTE ESTERASE UR QL STRIP: NEGATIVE
MICROALBUMIN UR-MCNC: <12 MG/L
MICROALBUMIN/CREAT UR: NORMAL MG/G{CREAT}
NITRATE UR QL: NEGATIVE
NONHDLC SERPL-MCNC: 188 MG/DL
PH UR STRIP: 6 [PH] (ref 5–7)
POTASSIUM SERPL-SCNC: 5.2 MMOL/L (ref 3.4–5.3)
SODIUM SERPL-SCNC: 138 MMOL/L (ref 135–145)
SP GR UR STRIP: <=1.005 (ref 1–1.03)
TRIGL SERPL-MCNC: 106 MG/DL
UROBILINOGEN UR STRIP-ACNC: 0.2 E.U./DL

## 2024-02-23 PROCEDURE — 85018 HEMOGLOBIN: CPT | Performed by: INTERNAL MEDICINE

## 2024-02-23 PROCEDURE — 82043 UR ALBUMIN QUANTITATIVE: CPT | Performed by: INTERNAL MEDICINE

## 2024-02-23 PROCEDURE — 99214 OFFICE O/P EST MOD 30 MIN: CPT | Mod: 24 | Performed by: INTERNAL MEDICINE

## 2024-02-23 PROCEDURE — 80061 LIPID PANEL: CPT | Performed by: INTERNAL MEDICINE

## 2024-02-23 PROCEDURE — 17262 DSTRJ MAL LES T/A/L 1.1-2.0: CPT | Mod: 79 | Performed by: NURSE PRACTITIONER

## 2024-02-23 PROCEDURE — 81003 URINALYSIS AUTO W/O SCOPE: CPT | Performed by: INTERNAL MEDICINE

## 2024-02-23 PROCEDURE — 36415 COLL VENOUS BLD VENIPUNCTURE: CPT | Performed by: INTERNAL MEDICINE

## 2024-02-23 PROCEDURE — 80048 BASIC METABOLIC PNL TOTAL CA: CPT | Performed by: INTERNAL MEDICINE

## 2024-02-23 PROCEDURE — 99397 PER PM REEVAL EST PAT 65+ YR: CPT | Mod: 24 | Performed by: INTERNAL MEDICINE

## 2024-02-23 PROCEDURE — 82570 ASSAY OF URINE CREATININE: CPT | Performed by: INTERNAL MEDICINE

## 2024-02-23 RX ORDER — LOSARTAN POTASSIUM 100 MG/1
50 TABLET ORAL DAILY
Start: 2024-02-23 | End: 2024-02-26

## 2024-02-23 RX ORDER — METOPROLOL SUCCINATE 50 MG/1
75 TABLET, EXTENDED RELEASE ORAL DAILY
Qty: 135 TABLET | Refills: 3 | Status: SHIPPED | OUTPATIENT
Start: 2024-02-23

## 2024-02-23 RX ORDER — HYDROCHLOROTHIAZIDE 12.5 MG/1
12.5 TABLET ORAL DAILY
Qty: 90 TABLET | Refills: 3 | Status: SHIPPED | OUTPATIENT
Start: 2024-02-23

## 2024-02-23 RX ORDER — RESPIRATORY SYNCYTIAL VIRUS VACCINE 120MCG/0.5
0.5 KIT INTRAMUSCULAR ONCE
Qty: 1 EACH | Refills: 0 | Status: CANCELLED | OUTPATIENT
Start: 2024-02-23 | End: 2024-02-23

## 2024-02-23 ASSESSMENT — ACTIVITIES OF DAILY LIVING (ADL): CURRENT_FUNCTION: NO ASSISTANCE NEEDED

## 2024-02-23 NOTE — TELEPHONE ENCOUNTER
Pt was called on 2/21/24 about biopsy results.  See TE from 2/21/24.    Eli PIMENTEL RN  [facility] Dermatology [facility]  [phone]

## 2024-02-23 NOTE — LETTER
2/23/2024         RE: Monika Serrano  [address] [facility] [address]        Dear Colleague,    Thank you for referring your patient, Monika Serrano, to the [facility]. Please see a copy of my visit note below.    [facility] Dermatology Note  Encounter Date: Feb 23, 2024  Office Visit     Reviewed patients past medical history and pertinent chart review prior to patients visit today.     Dermatology Problem List:  Superficial BCC left lateral knee, ED&C 2/23/2024   AK dorsum nose, cryo 2/15/2024      Patient denies family history of skin cancer or dysplastic nevi.      ____________________________________________    Assessment & Plan:     ELECTRODESSICATION AND CURETTAGE PROCEDURE NOTE    Site: left lateral knee  Size of lesion: 1.2 cm  Pathology:   Superficial bcc    A time out was taken to identify the patient and the correct site for the correct procedure.  Verbal informed consent was obtained.  Discussed risks including but not limited to: pain, bleeding, infection, scarring (the latter being essentially guaranteed)  The lesion was cleansed with a 70% isopropyl alcohol wipe and then injected with lidocaine 1% with 1:[id number] epinephrine. After anesthesia was ensured, a 4 mm nondisposable curette was used to remove the epidermis and superficial dermis of the entire visible lesion. A 4 mm margin around the periphery was gently passed over using the curette to evaluate to subclinical disease. Three cycles of curettage follow by electrofulguration and electrodessication of the base in standard fashion was performed.  The wound was then dressed with vaseline and a bandage; subsequent wound care was discussed in detail.    The final size of the defect was 1.6 cm .     Erika Sy, CNP  Dermatology    _______________________________________    CC: Derm Problem (ED & C to right knee ( BCC))    HPI:  Ms. Monika Serrano is a(n) 78 year old female who presents  today for follow-up  for superficial BCC biopsied 2/15/24. She would like to proceed with ED&C today.     Patient is otherwise feeling well, without additional skin concerns.      Physical Exam:  SKIN: Focused examination of left lateral knee lesion was performed.  - left lateral knee, 1.2 cm pink plaque with central erosion from previous biopsy    - No other lesions of concern on areas examined.     Medications:  Current Outpatient Medications   Medication     cetirizine (ZYRTEC) 10 MG tablet     hydroCHLOROthiazide 12.5 MG tablet     metoprolol succinate ER (TOPROL XL) 50 MG 24 hr tablet     NEW MED     ORDER FOR DME     UNABLE TO FIND     UNABLE TO FIND     UNABLE TO FIND     No current facility-administered medications for this visit.      Past Medical History:   Patient Active Problem List   Diagnosis     Fibromyalgia     Osteopenia     HAM (obstructive sleep apnea)- milld-moderate     Chronic insomnia     Hyperlipidemia LDL goal <40     zAdvanced directives, counseling/discussion     S/P aortic valve replacement     Coronary artery disease involving native coronary artery of native heart with angina pectoris (H24)     Motor restlessness     Combined forms of age-related cataract, mild, of left eye     Combined forms of age-related cataract, mild-mod, of right eye     Stable branch retinal vein occlusion of right eye (H28)     Stage 3a chronic kidney disease (H)     Hypertension goal BP (blood pressure) < 140/90     Sensorineural hearing loss, unilateral     Prophylactic antibiotic     Past Medical History:   Diagnosis Date     Acute respiratory failure (H) 12/8/2016     Allergic rhinitis      Anxiety 12/28/2016     Benign positional vertigo      Cataract 7/18/2011     Utility update for deleted IMO code Im[employer] Update utility     Coronary artery disease involving native coronary artery of native heart with angina pectoris (H24) 1/9/2017    Severe aortic stenosis, planned AVR, pre-operative angiogram: The left main  artery has minimal disease. the LAD has mild to moderate disease. The circumflex artery has minimal disease, co-dominant. RCA has moderate disease, co-dominant     Fibromyalgia 5/6/2010     Fracture of metatarsal Left    5th     Hyperlipidemia LDL goal <40      Hypertension goal BP (blood pressure) < 140/90 9/17/2019     HAM (obstructive sleep apnea)- milld-moderate     Sleep study 2002- GRULLON 25, low O2 91%. MSLT 8.4, no SOREMs Sleep study 2003- AHI 12.1, RDI 24, Lo2 91%. CPAP 10cm effective.  Sleep study on 5/22/13 (111#) - AHI 11.1, supine AHI 30.7, REM AHI 48, desaturations down to 84%, 1.8 minutes <+ 90%, RDI 41.2. REM AHI 48, consistent with excessive REM HAM. Periodic Limb Movement Index 0/hour.      Sensorineural hearing loss, unilateral 7/11/2022     Stage 3a chronic kidney disease (H) 8/15/2019     Urge incontinence      Uterine hyperplasia        CC No referring provider defined for this encounter. on close of this encounter.       Again, thank you for allowing me to participate in the care of your patient.        Sincerely,        Fátima Sy, GONZALEZ CNP

## 2024-02-23 NOTE — PROGRESS NOTES
"Preventive Care Visit  [facility] JOSE Fair MD, Internal Medicine  Feb 23, 2024    Assessment & Plan     Wellness examination  Routine screening issues , see orders section of this encounter   - REVIEW OF HEALTH MAINTENANCE PROTOCOL ORDERS    Hypertension goal BP (blood pressure) < 140/90  Today patient and I had a significant and detailed discussion regarding evaluation and management of hypertension. Ultimately I have tried my very best to work with this patient and I have always tried to explain what I do and say and why. I must say I am just a little bit disturbed by the way that this patient seems to entirely make her own decisions based on her own thinking and while I do understand patient has total permission to make such personal Decisions, she makes so many and tends to start and stop medications based on her own ideas and sometimes I am not even certain what she's doing. With her blood pressure she has moderately severe hypertension which is not controlled within acceptable limits and as of yet me efforts have not met with success. She had stopped taking losartan ( Cozaar ) not due to side effects but because she \" didn't think it was helping\". Likewise she had a discussion with her cardiologist and had small dose of spironolactone added last summer but she is also not taking this medication. Today her blood pressure is absolutely terrible and the recheck blood pressure is higher then when we started. It is possible this patient has resistant hypertension but I generally can't arrive at this diagnosis until a patient has diligently followed directions and we can then gradually walk her medications upwards until if necessary we would get to four drug therapy . Patient seems disinterested in such a treatment. After I expressed major worries for her framingham risk index score and high risk of ischemic vascular disease , she agrees to go back to taking the losartan ( Cozaar ) and " thus comes up to three drug therapy with a further follow up appointment with me coming in the next few months     - REVIEW OF HEALTH MAINTENANCE PROTOCOL ORDERS  - hydroCHLOROthiazide 12.5 MG tablet; Take 1 tablet (12.5 mg) by mouth daily  - metoprolol succinate ER (TOPROL XL) 50 MG 24 hr tablet; Take 1.5 tablets (75 mg) by mouth daily    Asymptomatic menopausal state  I reviewed with her in significant detail  the basic pathological process of osteoporosis and how this can lead to premature mortality and morbidity. Even despite all my discussion patient is not interested to proceed with DEXA scan     Stage 3a chronic kidney disease (H)  Due for follow up  , follow up as indicated on results   - UA Macroscopic with reflex to Microscopic and Culture; Future  - Albumin Random Urine Quantitative with Creat Ratio; Future  - REVIEW OF HEALTH MAINTENANCE PROTOCOL ORDERS  - Hemoglobin; Future  - BASIC METABOLIC PANEL; Future  - UA Macroscopic with reflex to Microscopic and Culture  - Albumin Random Urine Quantitative with Creat Ratio  - Hemoglobin  - BASIC METABOLIC PANEL    Coronary artery disease involving native coronary artery of native heart with angina pectoris (H24)  See office visit notes with [facility]   - Lipid panel reflex to direct LDL Non-fasting; Future  - REVIEW OF HEALTH MAINTENANCE PROTOCOL ORDERS  - Lipid panel reflex to direct LDL Non-fasting    Stable branch retinal vein occlusion of right eye (H28)  She sees a retinal specialist periodically   - REVIEW OF HEALTH MAINTENANCE PROTOCOL ORDERS    Nonobstructive atherosclerosis of coronary artery  As detailed above   - REVIEW OF HEALTH MAINTENANCE PROTOCOL ORDERS  - metoprolol succinate ER (TOPROL XL) 50 MG 24 hr tablet; Take 1.5 tablets (75 mg) by mouth daily    Need for vaccination against respiratory syncytial virus  Declines   - REVIEW OF HEALTH MAINTENANCE PROTOCOL ORDERS    Review of the result(s) of each unique test -  "today's tests  Prescription drug management  40 minutes spent by me on the date of the encounter doing chart review, history and exam, documentation and further activities per the note      Subjective   Monika is a 78 year old, presenting for the following:  Wellness Visit        11/17/2023     3:06 PM   Additional Questions   Roomed by Albina   Accompanied by none       Health Care Directive  Patient does not have a Health Care Directive or Living Will: Patient states has Advance Directive and will bring in a copy to clinic.    Healthy Habits:     In general, how would you rate your overall health?  Very good    Frequency of exercise:  6-7 days/week    Duration of exercise:  45-60 minutes    Do you usually eat at least 4 servings of fruit and vegetables a day, include whole grains    & fiber and avoid regularly eating high fat or \"junk\" foods?  Yes    Taking medications regularly:  Yes    Barriers to taking medications:  None    Medication side effects:  None    Ability to successfully perform activities of daily living:  No assistance needed    Home Safety:  No safety concerns identified    Hearing Impairment:  No hearing concerns (hearing aids)    In the past 6 months, have you been bothered by leaking of urine? Yes    In general, how would you rate your overall mental or emotional health?  Very good    Additional concerns today:  No  Still seeing a retinal specialist due to her history of branch retinal vein occlusion         2/3/2022   General Health   How would you rate your overall physical health? Good         2/3/2022   Nutrition   At least 4 servings of fruits and vegetables/day Yes         2/3/2022   Exercise   Frequency of exercise: 4-5 days/week      Body mass index is 23.81 kg/m .        2/23/2024   Fall Risk   Fallen 2 or more times in the past year? No   Trouble with walking or balance? No          2/3/2022   Activities of Daily Living- Home Safety   Needs help with the following daily activites NO " assistance is needed   Safety concerns in the home None of the above         3/12/2018   Dental   Dentist two times every year? Yes         2/3/2022   Hearing Screening   Hearing concerns? Difficulty following a conversation in a noisy restaurant or crowded room    Difficult to understand a speaker at a public meeting or Sikh service    Need to ask people to speak up or repeat themselves    Difficulty understanding soft or whispered speech     Hearing aids        No data to display                     Today's PHQ-2 Score:       2/23/2024     1:23 PM   PHQ-2 ( 1999 [employer])   Q1: Little interest or pleasure in doing things 1   Q2: Feeling down, depressed or hopeless 0   PHQ-2 Score 1   Q1: Little interest or pleasure in doing things Several days   Q2: Feeling down, depressed or hopeless Not at all   PHQ-2 Score 1           2/3/2022   Substance Use   Alcohol more than 3/day or more than 7/wk No     Social History     Tobacco Use    Smoking status: Never    Smokeless tobacco: Never   Vaping Use    Vaping Use: Never used   Substance Use Topics    Alcohol use: Yes     Alcohol/week: 2.0 standard drinks of alcohol     Types: 2 Standard drinks or equivalent per week     Comment: 1 glass of wine every other month     Drug use: No          Mammogram Screening - Mammography discussed and declined            Reviewed and updated as needed this visit by Provider                    Past Medical History:   Diagnosis Date    Acute respiratory failure (H) 12/8/2016    Allergic rhinitis     Anxiety 12/28/2016    Benign positional vertigo     Cataract 7/18/2011     Utility update for deleted IMO code Im[employer] Update utility    Coronary artery disease involving native coronary artery of native heart with angina pectoris (H24) 1/9/2017    Severe aortic stenosis, planned AVR, pre-operative angiogram: The left main artery has minimal disease. the LAD has mild to moderate disease. The circumflex artery has minimal disease, co-dominant. RCA  has moderate disease, co-dominant    Fibromyalgia 2010    Fracture of metatarsal Left    5th    Hyperlipidemia LDL goal <40     Hypertension goal BP (blood pressure) < 140/90 2019    HAM (obstructive sleep apnea)- milld-moderate     Sleep study - GRULLON 25, low O2 91%. MSLT 8.4, no SOREMs Sleep study - AHI 12.1, RDI 24, Lo2 91%. CPAP 10cm effective.  Sleep study on 13 (111#) - AHI 11.1, supine AHI 30.7, REM AHI 48, desaturations down to 84%, 1.8 minutes <+ 90%, RDI 41.2. REM AHI 48, consistent with excessive REM HAM. Periodic Limb Movement Index 0/hour.     Sensorineural hearing loss, unilateral 2022    Stage 3a chronic kidney disease (H) 8/15/2019    Urge incontinence     Uterine hyperplasia      Past Surgical History:   Procedure Laterality Date    AORTIC VALVE REPLACEMENT  2016     OB History    Para Term  AB Living   2 2 0 0 0 2   SAB IAB Ectopic Multiple Live Births   0 0 0 0 0      # Outcome Date GA Lbr Shaw/2nd Weight Sex Delivery Anes PTL Lv   2 Para            1 Para              Lab work is in process  Labs reviewed in EPIC  BP Readings from Last 3 Encounters:   24 (!) 183/92   23 (!) 146/85   23 (!) 148/82    Wt Readings from Last 3 Encounters:   24 57.2 kg (126 lb)   23 56.7 kg (125 lb)   23 56.8 kg (125 lb 3.2 oz)                  Patient Active Problem List   Diagnosis    Fibromyalgia    Osteopenia    HAM (obstructive sleep apnea)- milld-moderate    Chronic insomnia    Hyperlipidemia LDL goal <40    zAdvanced directives, counseling/discussion    S/P aortic valve replacement    Coronary artery disease involving native coronary artery of native heart with angina pectoris (H24)    Motor restlessness    Combined forms of age-related cataract, mild, of left eye    Combined forms of age-related cataract, mild-mod, of right eye    Stable branch retinal vein occlusion of right eye (H28)    Stage 3a chronic kidney disease (H)     Hypertension goal BP (blood pressure) < 140/90    Sensorineural hearing loss, unilateral    Prophylactic antibiotic     Past Surgical History:   Procedure Laterality Date    AORTIC VALVE REPLACEMENT  12/2016       Social History     Tobacco Use    Smoking status: Never    Smokeless tobacco: Never   Substance Use Topics    Alcohol use: Yes     Alcohol/week: 2.0 standard drinks of alcohol     Types: 2 Standard drinks or equivalent per week     Comment: 1 glass of wine every other month      Family History   Problem Relation Age of Onset    Hypertension Mother     C.A.D. Mother         d. of MI age 75    Lipids Mother     Hypertension Father     C.A.D. Father         age 40, d. age 63 of MI    Lipids Father     C.A.D. Brother         MI age 50    Heart Disease Brother         valve replace    C.A.D. Brother     Cancer Brother     Obesity Sister     Suicide Brother     Heart Surgery Brother     Pulmonary Embolism Brother     Heart Surgery Brother     Heart Surgery Brother          Current Outpatient Medications   Medication Sig Dispense Refill    cetirizine (ZYRTEC) 10 MG tablet Take 10 mg by mouth as needed for allergies      hydroCHLOROthiazide 12.5 MG tablet Take 1 tablet (12.5 mg) by mouth daily 90 tablet 3    losartan (COZAAR) 100 MG tablet Take 0.5 tablets (50 mg) by mouth daily      metoprolol succinate ER (TOPROL XL) 50 MG 24 hr tablet Take 1.5 tablets (75 mg) by mouth daily 135 tablet 3    NEW MED Chinese herbs.      ORDER FOR DME Auto-cpap 6-10      UNABLE TO FIND MEDICATION NAME: Semetry      UNABLE TO FIND MEDICATION NAME: Lycium Support  M: [employer] E:01/14/24, L:[id number]      UNABLE TO FIND MEDICATION NAME: Circulation SJ  M: [employer]  E:04/25/23  L:[id number]       Allergies   Allergen Reactions    Atorvastatin Cramps    Sulfa Antibiotics Rash     Recent Labs   Lab Test 02/24/23  1526 02/02/23  1235 02/03/22  1803  02/03/22  1803 12/07/20  1017 04/19/19  1309 01/23/19  1221   LDL  --  155*  --  142* 163* 147* 151*   HDL  --  62  --  61 61 68 62   TRIG  --  75  --  140 138 80 123   ALT  --   --   --   --   --   --  21   CR 0.92 1.05*   < > 1.10* 1.02 0.97  --    GFRESTIMATED 63 54*   < > 51* 53* 57*  --    GFRESTBLACK  --   --   --   --  61 66  --    POTASSIUM 4.0 4.2   < > 4.1 4.3 5.0  --    TSH  --   --   --   --  2.00  --  1.65    < > = values in this interval not displayed.      Current providers sharing in care for this patient include:  Patient Care Team:  Missael Fair MD as PCP - General (Internal Medicine)  Edy Cota MD as MD (Transplant)  Missael Fair MD as Assigned PCP  Uche Warren APRN CNP as Assigned Sleep Provider  Fátima Sy APRN CNP as Nurse Practitioner (Dermatology)    The following health maintenance items are reviewed in Epic and correct as of today:  Health Maintenance   Topic Date Due    URINALYSIS  Never done    Pneumococcal Vaccine: 65+ Years (1 of 2 - PCV) Never done    RSV VACCINE (Pregnancy & 60+) (1 - 1-dose 60+ series) Never done    MAMMO SCREENING  12/23/2021    MEDICARE ANNUAL WELLNESS VISIT  02/03/2023    MICROALBUMIN  02/03/2023    INFLUENZA VACCINE (1) 09/01/2023    COVID-19 Vaccine (6 - 2023-24 season) 09/01/2023    LIPID  02/02/2024    ANNUAL REVIEW OF HM ORDERS  02/02/2024    HEMOGLOBIN  02/02/2024    BMP  02/24/2024    EYE EXAM  03/31/2024    FALL RISK ASSESSMENT  02/23/2025    GLUCOSE  02/24/2026    DEXA  05/25/2026    ADVANCE CARE PLANNING  02/03/2027    DTAP/TDAP/TD IMMUNIZATION (2 - Td or Tdap) 08/15/2029    PHQ-2 (once per calendar year)  Completed    ZOSTER IMMUNIZATION  Completed    IPV IMMUNIZATION  Aged Out    HPV IMMUNIZATION  Aged Out    MENINGITIS IMMUNIZATION  Aged Out    RSV MONOCLONAL ANTIBODY  Aged Out    COLORECTAL CANCER SCREENING  Discontinued     Health Maintenance Due   Topic Date Due    URINALYSIS  Never done    Pneumococcal Vaccine: 65+ Years (1  "of 2 - PCV) Never done    RSV VACCINE (Pregnancy & 60+) (1 - 1-dose 60+ series) Never done    MAMMO SCREENING  12/23/2021    MEDICARE ANNUAL WELLNESS VISIT  02/03/2023    MICROALBUMIN  02/03/2023    INFLUENZA VACCINE (1) 09/01/2023    COVID-19 Vaccine (6 - 2023-24 season) 09/01/2023    LIPID  02/02/2024    ANNUAL REVIEW OF HM ORDERS  02/02/2024    HEMOGLOBIN  02/02/2024    BMP  02/24/2024      BP Readings from Last 6 Encounters:   02/23/24 (!) 165/94   11/30/23 (!) 146/85   11/17/23 (!) 148/82   06/07/23 130/70   05/05/23 (!) 140/88   02/24/23 (!) 164/98     Did not appreciated any benefit from treatment with losartan ( Cozaar )       Review of Systems  Constitutional, HEENT, cardiovascular, pulmonary, gi and gu systems are negative, except as otherwise noted.     Objective    Exam  BP (!) 165/94   Pulse 76   Temp 97.8  F (36.6  C) (Temporal)   Resp 16   Ht 1.549 m (5' 1\")   Wt 57.2 kg (126 lb)   LMP  (LMP Unknown)   SpO2 95%   BMI 23.81 kg/m     Estimated body mass index is 23.81 kg/m  as calculated from the following:    Height as of this encounter: 1.549 m (5' 1\").    Weight as of this encounter: 57.2 kg (126 lb).    Physical Exam  GENERAL: alert and no distress  EYES: Eyes grossly normal to inspection, PERRL and conjunctivae and sclerae normal  HENT: ear canals and TM's normal, nose and mouth without ulcers or lesions  NECK: no adenopathy, no asymmetry, masses, or scars  RESP: lungs clear to auscultation - no rales, rhonchi or wheezes  CV: regular rate and rhythm, normal S1 S2, no S3 or S4, no murmur, click or rub, no peripheral edema  ABDOMEN: soft, nontender, no hepatosplenomegaly, no masses and bowel sounds normal  MS: no gross musculoskeletal defects noted, no edema  SKIN: no suspicious lesions or rashes  NEURO: Normal strength and tone, mentation intact and speech normal  PSYCH: mentation appears normal, affect normal/bright          No data to display              Patient does not have dementia , " recall is intact     Signed Electronically by: Missael Fair MD

## 2024-02-23 NOTE — TELEPHONE ENCOUNTER
Called and spoke with patient, she requests to speak directly to the dermatologist regarding her options.   Routing to provider, please give the patient a call today to further discuss.     Thank you   Wanda MCGOVERN RN BSN  [facility]  [phone]

## 2024-02-23 NOTE — LETTER
February 27, 2024    Monika Serrano  [address] [facility] [address]          Dear ,    We are writing to inform you of your test results.  Fasting lipid panel numbers are just a little bit elevated but we are not starting a new statin medication in an 78 years old patient. Please write back if you'd like to discuss this more or call or MyChart message me.     Your gfr [ glomerular filtration rate ] and kidney function remains just a little bit diminished but is not different then before so we don't need to worry, in my opinion     Normal hemoglobin     All in all these laboratory studies are ok but I remain concerned about your elevated blood pressure readings and we need to keep on it until we get your blood pressure under control.         Resulted Orders   UA Macroscopic with reflex to Microscopic and Culture   Result Value Ref Range    Color Urine Yellow Colorless, Straw, Light Yellow, Yellow    Appearance Urine Clear Clear    Glucose Urine Negative Negative mg/dL    Bilirubin Urine Negative Negative    Ketones Urine Negative Negative mg/dL    Specific Gravity Urine <=1.005 1.003 - 1.035    Blood Urine Negative Negative    pH Urine 6.0 5.0 - 7.0    Protein Albumin Urine Negative Negative mg/dL    Urobilinogen Urine 0.2 0.2, 1.0 E.U./dL    Nitrite Urine Negative Negative    Leukocyte Esterase Urine Negative Negative    Narrative    Microscopic not indicated   Albumin Random Urine Quantitative with Creat Ratio   Result Value Ref Range    Creatinine Urine mg/dL 26.4 mg/dL      Comment:      The reference ranges have not been established in urine creatinine. The results should be integrated into the clinical context for interpretation.    Albumin Urine mg/L <12.0 mg/L      Comment:      The reference ranges have not been established in urine albumin. The results should be integrated into the clinical context for interpretation.    Albumin Urine mg/g Cr        Comment:      Unable to calculate,  Script printed and placed in my outbasket.  Trisha Guerra MD     urine albumin and/or urine creatinine is outside detectable limits.  Microalbuminuria is defined as an albumin:creatinine ratio of 17 to 299 for males and 25 to 299 for females. A ratio of albumin:creatinine of 300 or higher is indicative of overt proteinuria.  Due to biologic variability, positive results should be confirmed by a second, first-morning random or 24-hour timed urine specimen. If there is discrepancy, a third specimen is recommended. When 2 out of 3 results are in the microalbuminuria range, this is evidence for incipient nephropathy and warrants increased efforts at glucose control, blood pressure control, and institution of therapy with an angiotensin-converting-enzyme (ACE) inhibitor (if the patient can tolerate it).     Lipid panel reflex to direct LDL Non-fasting   Result Value Ref Range    Cholesterol 246 (H) <200 mg/dL    Triglycerides 106 <150 mg/dL    Direct Measure HDL 58 >=50 mg/dL    LDL Cholesterol Calculated 167 (H) <=100 mg/dL    Non HDL Cholesterol 188 (H) <130 mg/dL    Patient Fasting > 8hrs? Unknown     Narrative    Cholesterol  Desirable:  <200 mg/dL    Triglycerides  Normal:  Less than 150 mg/dL  Borderline High:  150-199 mg/dL  High:  200-499 mg/dL  Very High:  Greater than or equal to 500 mg/dL    Direct Measure HDL  Female:  Greater than or equal to 50 mg/dL   Male:  Greater than or equal to 40 mg/dL    LDL Cholesterol  Desirable:  <100mg/dL  Above Desirable:  100-129 mg/dL   Borderline High:  130-159 mg/dL   High:  160-189 mg/dL   Very High:  >= 190 mg/dL    Non HDL Cholesterol  Desirable:  130 mg/dL  Above Desirable:  130-159 mg/dL  Borderline High:  160-189 mg/dL  High:  190-219 mg/dL  Very High:  Greater than or equal to 220 mg/dL   Hemoglobin   Result Value Ref Range    Hemoglobin 14.0 11.7 - 15.7 g/dL   BASIC METABOLIC PANEL   Result Value Ref Range    Sodium 138 135 - 145 mmol/L      Comment:      Reference intervals for this test were updated on 09/26/2023 to more  accurately reflect our healthy population. There may be differences in the flagging of prior results with similar values performed with this method. Interpretation of those prior results can be made in the context of the updated reference intervals.     Potassium 5.2 3.4 - 5.3 mmol/L    Chloride 103 98 - 107 mmol/L    Carbon Dioxide (CO2) 24 22 - 29 mmol/L    Anion Gap 11 7 - 15 mmol/L    Urea Nitrogen 22.7 8.0 - 23.0 mg/dL    Creatinine 1.22 (H) 0.51 - 0.95 mg/dL    GFR Estimate 45 (L) >60 mL/min/1.73m2    Calcium 9.9 8.8 - 10.2 mg/dL    Glucose 101 (H) 70 - 99 mg/dL       If you have any questions or concerns, please call the clinic at the number listed above.       Sincerely,      Missael Fair MD

## 2024-02-23 NOTE — PROGRESS NOTES
[facility] Dermatology Note  Encounter Date: Feb 23, 2024  Office Visit     Reviewed patients past medical history and pertinent chart review prior to patients visit today.     Dermatology Problem List:  Superficial BCC left lateral knee, ED&C 2/23/2024   AK dorsum nose, cryo 2/15/2024      Patient denies family history of skin cancer or dysplastic nevi.      ____________________________________________    Assessment & Plan:     ELECTRODESSICATION AND CURETTAGE PROCEDURE NOTE    Site: left lateral knee  Size of lesion: 1.2 cm  Pathology:   Superficial bcc    A time out was taken to identify the patient and the correct site for the correct procedure.  Verbal informed consent was obtained.  Discussed risks including but not limited to: pain, bleeding, infection, scarring (the latter being essentially guaranteed)  The lesion was cleansed with a 70% isopropyl alcohol wipe and then injected with lidocaine 1% with [id number] epinephrine. After anesthesia was ensured, a 4 mm nondisposable curette was used to remove the epidermis and superficial dermis of the entire visible lesion. A 4 mm margin around the periphery was gently passed over using the curette to evaluate to subclinical disease. Three cycles of curettage follow by electrofulguration and electrodessication of the base in standard fashion was performed.  The wound was then dressed with vaseline and a bandage; subsequent wound care was discussed in detail.    The final size of the defect was 1.6 cm .     Erika Sy, ALEXUS  Dermatology    _______________________________________    CC: Derm Problem (ED & C to right knee ( BCC))    HPI:  Ms. Monika Serrano is a(n) 78 year old female who presents today for follow-up  for superficial BCC biopsied 2/15/24. She would like to proceed with ED&C today.     Patient is otherwise feeling well, without additional skin concerns.      Physical Exam:  SKIN: Focused examination of left lateral knee lesion was  performed.  - left lateral knee, 1.2 cm pink plaque with central erosion from previous biopsy    - No other lesions of concern on areas examined.     Medications:  Current Outpatient Medications   Medication    cetirizine (ZYRTEC) 10 MG tablet    hydroCHLOROthiazide 12.5 MG tablet    metoprolol succinate ER (TOPROL XL) 50 MG 24 hr tablet    NEW MED    ORDER FOR DME    UNABLE TO FIND    UNABLE TO FIND    UNABLE TO FIND     No current facility-administered medications for this visit.      Past Medical History:   Patient Active Problem List   Diagnosis    Fibromyalgia    Osteopenia    HAM (obstructive sleep apnea)- milld-moderate    Chronic insomnia    Hyperlipidemia LDL goal <40    zAdvanced directives, counseling/discussion    S/P aortic valve replacement    Coronary artery disease involving native coronary artery of native heart with angina pectoris (H24)    Motor restlessness    Combined forms of age-related cataract, mild, of left eye    Combined forms of age-related cataract, mild-mod, of right eye    Stable branch retinal vein occlusion of right eye (H28)    Stage 3a chronic kidney disease (H)    Hypertension goal BP (blood pressure) < 140/90    Sensorineural hearing loss, unilateral    Prophylactic antibiotic     Past Medical History:   Diagnosis Date    Acute respiratory failure (H) 12/8/2016    Allergic rhinitis     Anxiety 12/28/2016    Benign positional vertigo     Cataract 7/18/2011     Utility update for deleted IMO code [employer] Update utility    Coronary artery disease involving native coronary artery of native heart with angina pectoris (H24) 1/9/2017    Severe aortic stenosis, planned AVR, pre-operative angiogram: The left main artery has minimal disease. the LAD has mild to moderate disease. The circumflex artery has minimal disease, co-dominant. RCA has moderate disease, co-dominant    Fibromyalgia 5/6/2010    Fracture of metatarsal Left    5th    Hyperlipidemia LDL goal <40     Hypertension goal BP  (blood pressure) < 140/90 9/17/2019    HAM (obstructive sleep apnea)- milld-moderate     Sleep study 2002- GRULLON 25, low O2 91%. MSLT 8.4, no SOREMs Sleep study 2003- AHI 12.1, RDI 24, Lo2 91%. CPAP 10cm effective.  Sleep study on 5/22/13 (111#) - AHI 11.1, supine AHI 30.7, REM AHI 48, desaturations down to 84%, 1.8 minutes <+ 90%, RDI 41.2. REM AHI 48, consistent with excessive REM HAM. Periodic Limb Movement Index 0/hour.     Sensorineural hearing loss, unilateral 7/11/2022    Stage 3a chronic kidney disease (H) 8/15/2019    Urge incontinence     Uterine hyperplasia        CC No referring provider defined for this encounter. on close of this encounter.

## 2024-02-26 DIAGNOSIS — I10 HYPERTENSION GOAL BP (BLOOD PRESSURE) < 140/90: Chronic | ICD-10-CM

## 2024-02-26 DIAGNOSIS — N18.31 STAGE 3A CHRONIC KIDNEY DISEASE (H): ICD-10-CM

## 2024-02-26 RX ORDER — LOSARTAN POTASSIUM 100 MG/1
50 TABLET ORAL DAILY
Qty: 45 TABLET | Refills: 1 | Status: SHIPPED | OUTPATIENT
Start: 2024-02-26 | End: 2024-08-16

## 2024-03-25 ENCOUNTER — ALLIED HEALTH/NURSE VISIT (OUTPATIENT)
Dept: FAMILY MEDICINE | Facility: CLINIC | Age: 79
End: 2024-03-25
Payer: COMMERCIAL

## 2024-03-25 DIAGNOSIS — Z01.30 BP CHECK: Primary | ICD-10-CM

## 2024-03-25 PROCEDURE — 99207 PR NO CHARGE NURSE ONLY: CPT | Performed by: INTERNAL MEDICINE

## 2024-03-25 NOTE — PROGRESS NOTES
Monika Serrano was evaluated at [facility] on March 25, 2024 at which time her blood pressure was:      Systolic (Patient Reported): 131 (3/22/2024  9:32 AM)  Diastolic (Patient Reported): 85 (3/22/2024  9:32 AM)        Reviewed lifestyle modifications for blood pressure control and reduction: including making healthy food choices, managing weight, getting regular exercise, smoking cessation, reducing alcohol consumption, monitoring blood pressure regularly.     Symptoms: None    BP Goal:< 140/90 mmHg    BP Assessment:  BP at goal    Potential Reasons for BP too high: NA - Not applicable    BP Follow-Up Plan: Recheck BP in 6 months at pharmacy    Recommendation to Provider: continue current therapy    Note completed by: Saba Mercado, PharmD      [phone]

## 2024-03-28 ENCOUNTER — TELEPHONE (OUTPATIENT)
Dept: FAMILY MEDICINE | Facility: CLINIC | Age: 79
End: 2024-03-28
Payer: COMMERCIAL

## 2024-03-28 NOTE — TELEPHONE ENCOUNTER
Writer called patient and relayed provider's message to patient, patient states understanding of home care recommendations and notes that she has been doing those and will continue.    Patient states that she has had issues with hay fever and post-nasal drip for the last year, notes that she has had previous sinus infections as well. Patient denies any other current cold symptoms, fever, or mucus production. Patient states that the cough comes and goes and only happens at certain places.    Writer discussed recommendations and offered appointment with PCP to further discuss symptoms if bothersome, patient declined at this time and will continue to monitor and reach out if any further questions/concerns.    ABBY HuntN RN  [facility]

## 2024-03-28 NOTE — TELEPHONE ENCOUNTER
There are no such symptoms associated with taking either hydrochlorothiazide or losartan ( Cozaar ).    The only medication that can cause symptoms like these would be lisinopril or what is known as an angiotensin converting enzyme inhibitor which you are not taking such a medication     Try to get more of a history , does she have postnasal drip or fever or chills or sputum production etc. Otherwise technically her coughing is nonspecific . And keep an eye on things     She can try Use cough drops or hard candy such as Sucrets or other throat lozenges     Try a teaspoon of honey    Drink up, lots of fluid    Heat up that drink, sometimes warm milk or even warm water can soothe an inflamed throat     Try using cough medicine such as Plain Robitussin which is a good cough suppression treatment.    Benzonatate [ tessalon perles ] are a good short term treatment option,     Possibly robitussin AC 4 oz, 1-2 tsp q 4-6 hours can be appropriate for some patients     Use a decongestant such as Guaifenesin [ mucinex ]     Breathe in steam.    Missael Fair MD

## 2024-03-28 NOTE — TELEPHONE ENCOUNTER
Patient calling stating she has recently developed a dry cough/tickle in throat. She states she is not sick and just has noticed this bothersome cough that recently developed. Patient stated she recently started hydrochlorothiazide and losartan and wonders if this is the cause.    Patient states she has tried cough drops, drinking water and nothing helps.      Patient stated if it is because of the losartan she would like to look at alternative medications.      Pat Chung RN on 3/28/2024 at 2:49 PM

## 2024-04-19 DIAGNOSIS — G47.33 OBSTRUCTIVE SLEEP APNEA (ADULT) (PEDIATRIC): Primary | ICD-10-CM

## 2024-05-01 ENCOUNTER — ALLIED HEALTH/NURSE VISIT (OUTPATIENT)
Dept: FAMILY MEDICINE | Facility: CLINIC | Age: 79
End: 2024-05-01
Payer: COMMERCIAL

## 2024-05-01 DIAGNOSIS — Z01.30 BP CHECK: Primary | ICD-10-CM

## 2024-05-01 PROCEDURE — 99207 PR NO CHARGE NURSE ONLY: CPT | Performed by: INTERNAL MEDICINE

## 2024-05-01 NOTE — PROGRESS NOTES
Monika Serrano was evaluated at [facility] on May 1, 2024 at which time her blood pressure was:      Systolic (Patient Reported): 128 (4/30/2024  9:18 AM)  Diastolic (Patient Reported): 78 (4/30/2024  9:18 AM)        Reviewed lifestyle modifications for blood pressure control and reduction: including making healthy food choices, managing weight, getting regular exercise, smoking cessation, reducing alcohol consumption, monitoring blood pressure regularly.     Symptoms: None    BP Goal:< 140/90 mmHg    BP Assessment:  BP at goal    Potential Reasons for BP too high: NA - Not applicable    BP Follow-Up Plan: Recheck BP in 6 months at pharmacy    Recommendation to Provider: continue plan    Note completed by: Saba Mercado, PharmD      [phone]

## 2024-05-25 ENCOUNTER — TRANSFERRED RECORDS (OUTPATIENT)
Dept: MULTI SPECIALTY CLINIC | Facility: CLINIC | Age: 79
End: 2024-05-25
Payer: COMMERCIAL

## 2024-05-25 LAB — RETINOPATHY: NORMAL

## 2024-05-30 ENCOUNTER — NURSE TRIAGE (OUTPATIENT)
Dept: NURSING | Facility: CLINIC | Age: 79
End: 2024-05-30
Payer: COMMERCIAL

## 2024-05-30 ENCOUNTER — OFFICE VISIT (OUTPATIENT)
Dept: FAMILY MEDICINE | Facility: CLINIC | Age: 79
End: 2024-05-30
Payer: COMMERCIAL

## 2024-05-30 VITALS
BODY MASS INDEX: 24.43 KG/M2 | HEIGHT: 61 IN | WEIGHT: 129.4 LBS | RESPIRATION RATE: 16 BRPM | TEMPERATURE: 97.6 F | OXYGEN SATURATION: 98 % | SYSTOLIC BLOOD PRESSURE: 154 MMHG | HEART RATE: 68 BPM | DIASTOLIC BLOOD PRESSURE: 83 MMHG

## 2024-05-30 DIAGNOSIS — H00.11 CHALAZION OF RIGHT UPPER EYELID: ICD-10-CM

## 2024-05-30 DIAGNOSIS — J01.00 ACUTE NON-RECURRENT MAXILLARY SINUSITIS: Primary | ICD-10-CM

## 2024-05-30 PROCEDURE — G2211 COMPLEX E/M VISIT ADD ON: HCPCS | Performed by: FAMILY MEDICINE

## 2024-05-30 PROCEDURE — 99214 OFFICE O/P EST MOD 30 MIN: CPT | Performed by: FAMILY MEDICINE

## 2024-05-30 RX ORDER — AMOXICILLIN AND CLAVULANATE POTASSIUM 500; 125 MG/1; MG/1
1 TABLET, FILM COATED ORAL 2 TIMES DAILY
Qty: 20 TABLET | Refills: 0 | Status: SHIPPED | OUTPATIENT
Start: 2024-05-30 | End: 2024-06-09

## 2024-05-30 NOTE — PROGRESS NOTES
Assessment & Plan       ICD-10-CM    1. Acute non-recurrent maxillary sinusitis  J01.00 amoxicillin-clavulanate (AUGMENTIN) 500-125 MG tablet      2. Chalazion of right upper eyelid  H00.11         Recommend warm compress, augmentin for sinusitis treatment    The longitudinal plan of care for the diagnosis(es)/condition(s) as documented were addressed during this visit. Due to the added complexity in care, I will continue to support Monika in the subsequent management and with ongoing continuity of care.           There are no Patient Instructions on file for this visit.    Subjective   Monika is a 78 year old, presenting for the following health issues:  RECHECK (Right eyelid)        5/30/2024     9:02 AM   Additional Questions   Roomed by Albina   Accompanied by none         5/30/2024     9:02 AM   Patient Reported Additional Medications   Patient reports taking the following new medications azithromycin (Zithromax Z-Braxton) 250 mg tablet     Via the Health Maintenance questionnaire, the patient has reported the following services have been completed -Eye Exam: tejas joseph 2024-05-25, this information has been sent to the abstraction team.  History of Present Illness       Reason for visit:  Swelling of my right eyelid  Symptom onset:  1-3 days ago  Symptom intensity:  Moderate  Symptom progression:  Staying the same  Had these symptoms before:  No  What makes it worse:  Touching it  What makes it better:  Doing some energy work on it    She eats 2-3 servings of fruits and vegetables daily.She consumes 1 sweetened beverage(s) daily.She exercises with enough effort to increase her heart rate 20 to 29 minutes per day.  She exercises with enough effort to increase her heart rate 5 days per week.   She is taking medications regularly.                 Review of Systems  Constitutional, HEENT, cardiovascular, pulmonary, gi and gu systems are negative, except as otherwise noted.      Objective    BP (!) 154/83   " Pulse 68   Temp 97.6  F (36.4  C) (Temporal)   Resp 16   Ht 1.549 m (5' 1\")   Wt 58.7 kg (129 lb 6.4 oz)   LMP  (LMP Unknown)   SpO2 98%   BMI 24.45 kg/m    Body mass index is 24.45 kg/m .  Physical Exam  Vitals reviewed.   Constitutional:       General: She is not in acute distress.     Appearance: Normal appearance. She is well-developed.   HENT:      Head: Normocephalic and atraumatic.      Right Ear: External ear normal.      Left Ear: External ear normal.      Nose: Nose normal.   Eyes:      General: No scleral icterus.     Extraocular Movements: Extraocular movements intact.      Conjunctiva/sclera: Conjunctivae normal.      Comments: Right sided eyelid swelling, redness.  Tenderness is pronounced at the medial aspect of eyelid.  Does have some tenderness and swelling over right maxilla   Cardiovascular:      Rate and Rhythm: Normal rate.   Pulmonary:      Effort: Pulmonary effort is normal.   Musculoskeletal:         General: No deformity. Normal range of motion.      Cervical back: Normal range of motion.   Skin:     General: Skin is warm and dry.      Findings: No rash.   Neurological:      Mental Status: She is alert and oriented to person, place, and time. Mental status is at baseline.      Gait: Gait normal.   Psychiatric:         Behavior: Behavior normal.         Thought Content: Thought content normal.         Judgment: Judgment normal.                    Signed Electronically by: Efrain Nuñez MD    "

## 2024-05-30 NOTE — TELEPHONE ENCOUNTER
Right eye is swollen, lid half closed. Saw eye MD yesterday. Didn't see any results on chart. Wants to talk with her again and also see Dr Fair. Dr Lolis Velasco at [facility] did the exam. She wants an appointment with Dr Fair or one of his colleagues. I connected with scheduling for an appointment. I explained her eye Dr visit is in an [facility] computer system that isn't the same as [facility]. That's why she couldn't see any of the eye MD notes.  Quyen Garcia, RN  [facility]    Reason for Disposition   [1] Follow-up call to recent contact AND [2] information only call, no triage required    Additional Information   Negative: [1] Caller is not with the adult (patient) AND [2] reporting urgent symptoms   Negative: Lab result questions   Negative: Medication questions   Negative: Caller can't be reached by phone   Negative: Caller has already spoken to PCP or another triager   Negative: RN needs further essential information from caller in order to complete triage   Negative: Requesting regular office appointment   Negative: [1] Caller requesting NON-URGENT health information AND [2] PCP's office is the best resource   Negative: Health Information question, no triage required and triager able to answer question   Negative: General information question, no triage required and triager able to answer question   Negative: Question about upcoming scheduled test, no triage required and triager able to answer question   Negative: [1] Caller is not with the adult (patient) AND [2] probable NON-URGENT symptoms    Protocols used: Information Only Call - No Triage-A-

## 2024-06-28 ENCOUNTER — ALLIED HEALTH/NURSE VISIT (OUTPATIENT)
Dept: FAMILY MEDICINE | Facility: CLINIC | Age: 79
End: 2024-06-28
Payer: COMMERCIAL

## 2024-06-28 DIAGNOSIS — Z01.30 BP CHECK: Primary | ICD-10-CM

## 2024-06-28 PROCEDURE — 99207 PR NO CHARGE NURSE ONLY: CPT | Performed by: INTERNAL MEDICINE

## 2024-06-28 NOTE — PROGRESS NOTES
Monika Serrano was evaluated at [facility] on June 28, 2024 at which time her blood pressure was:    BP Readings from Last 1 Encounters:   05/30/24 (!) 154/83     Systolic (Patient Reported): 115 (6/28/2024  1:39 PM)  Diastolic (Patient Reported): 72 (6/28/2024  1:39 PM)        Reviewed lifestyle modifications for blood pressure control and reduction: including making healthy food choices, managing weight, getting regular exercise, smoking cessation, reducing alcohol consumption, monitoring blood pressure regularly.     Symptoms: None    BP Goal:< 140/90 mmHg    BP Assessment:  BP at goal    Potential Reasons for BP too high: NA - Not applicable    BP Follow-Up Plan: Recheck BP in 6 months at pharmacy    Recommendation to Provider: follow current plan     Note completed by: Kiki Rudolph RPH

## 2024-07-01 ENCOUNTER — TELEPHONE (OUTPATIENT)
Dept: DERMATOLOGY | Facility: CLINIC | Age: 79
End: 2024-07-01
Payer: COMMERCIAL

## 2024-07-01 ENCOUNTER — MYC MEDICAL ADVICE (OUTPATIENT)
Dept: FAMILY MEDICINE | Facility: CLINIC | Age: 79
End: 2024-07-01
Payer: COMMERCIAL

## 2024-07-01 NOTE — TELEPHONE ENCOUNTER
[facility] Call Center    Phone Message    May a detailed message be left on voicemail: yes     Reason for Call: Other: Outer left knee not healed after being scraped back in Feb. Pt. Is concerned why it is not healing. Please call pt back to discuss. Thanks       Action Taken: Message routed to:  Other: FK - derm    Travel Screening: Not Applicable     Date of Service:

## 2024-07-01 NOTE — TELEPHONE ENCOUNTER
S/w pt and advised showed pictures to Yulia Del Rosario who states the area looks healed and is discolored. Advised healed to dermatology is that the wound is closed and pt states the wound has been closed for some time.  Advised legs take a longer time to heal.  Continue to apply lotion/cream to the area and massage the area.  Pt states understanding.    Eli PIMENTEL RN  [facility] [place]  [phone]

## 2024-07-01 NOTE — TELEPHONE ENCOUNTER
S/w pt who states she had a biopsy and ED&C on the outside of her left knee back in February and states it is not healing.  States there is still a red ring around the site and a scab.  Sometimes has a white center.  Has been applying a cream from her accupuncture therapist that was recommended but does forget to apply it at times.  Asked if pt could send a picture of the site for evaluation.  Pt states she is willing to send a picture.  Sent pt a message via my chart on how to send a picture so nurse is able to evaluate and will call pt back with recommendations.      Eli PIMENTEL RN  [facility] [place]  [phone]

## 2024-07-01 NOTE — TELEPHONE ENCOUNTER
See telephone encounter from 7/1/24    Eli PIMENTEL RN  [facility] [place]  [phone]

## 2024-07-31 ENCOUNTER — ALLIED HEALTH/NURSE VISIT (OUTPATIENT)
Dept: FAMILY MEDICINE | Facility: CLINIC | Age: 79
End: 2024-07-31

## 2024-07-31 ENCOUNTER — ALLIED HEALTH/NURSE VISIT (OUTPATIENT)
Dept: FAMILY MEDICINE | Facility: CLINIC | Age: 79
End: 2024-07-31
Payer: COMMERCIAL

## 2024-07-31 VITALS — DIASTOLIC BLOOD PRESSURE: 62 MMHG | SYSTOLIC BLOOD PRESSURE: 118 MMHG

## 2024-07-31 DIAGNOSIS — Z01.30 BP CHECK: Primary | ICD-10-CM

## 2024-07-31 PROCEDURE — 99207 PR NO CHARGE NURSE ONLY: CPT | Performed by: INTERNAL MEDICINE

## 2024-07-31 NOTE — PROGRESS NOTES
Monika Serrano was evaluated at [facility] on July 31, 2024 at which time her blood pressure was:      Systolic (Patient Reported): 120 (7/31/2024  7:30 AM)  Diastolic (Patient Reported): 71 (7/31/2024  7:30 AM)        Reviewed lifestyle modifications for blood pressure control and reduction: including making healthy food choices, managing weight, getting regular exercise, smoking cessation, reducing alcohol consumption, monitoring blood pressure regularly.     Symptoms: None    BP Goal:< 140/90 mmHg    BP Assessment:  BP at goal    Potential Reasons for BP too high: NA - Not applicable    BP Follow-Up Plan: Recheck BP in 6 months at pharmacy    Recommendation to Provider: continue current plan    Note completed by: Saba Mercado, PharmD      [phone]

## 2024-07-31 NOTE — PROGRESS NOTES
Monika Serrano was evaluated at [facility] on July 31, 2024 at which time her blood pressure was:    BP Readings from Last 1 Encounters:   07/31/24 118/62     Systolic (Patient Reported): 120 (7/31/2024  7:30 AM)  Diastolic (Patient Reported): 71 (7/31/2024  7:30 AM)        Reviewed lifestyle modifications for blood pressure control and reduction: including making healthy food choices, managing weight, getting regular exercise, smoking cessation, reducing alcohol consumption, monitoring blood pressure regularly.     Symptoms: None    BP Goal:< 140/90 mmHg    BP Assessment:  BP at goal    Potential Reasons for BP too high: NA - Not applicable    BP Follow-Up Plan: Recheck BP in 6 months at pharmacy    Recommendation to Provider: continue current medication regimen.     Note completed by: Heike Gloria RPH

## 2024-08-16 DIAGNOSIS — I10 HYPERTENSION GOAL BP (BLOOD PRESSURE) < 140/90: Chronic | ICD-10-CM

## 2024-08-16 DIAGNOSIS — N18.31 STAGE 3A CHRONIC KIDNEY DISEASE (H): ICD-10-CM

## 2024-08-16 RX ORDER — LOSARTAN POTASSIUM 100 MG/1
TABLET ORAL
Qty: 45 TABLET | Refills: 1 | Status: SHIPPED | OUTPATIENT
Start: 2024-08-16

## 2024-08-19 ENCOUNTER — TRANSFERRED RECORDS (OUTPATIENT)
Dept: HEALTH INFORMATION MANAGEMENT | Facility: CLINIC | Age: 79
End: 2024-08-19
Payer: COMMERCIAL

## 2024-10-10 ENCOUNTER — HOSPITAL ENCOUNTER (EMERGENCY)
Facility: HOSPITAL | Age: 79
Discharge: HOME | End: 2024-10-10
Payer: COMMERCIAL

## 2024-10-10 VITALS
RESPIRATION RATE: 16 BRPM | SYSTOLIC BLOOD PRESSURE: 171 MMHG | HEART RATE: 64 BPM | TEMPERATURE: 97.9 F | DIASTOLIC BLOOD PRESSURE: 99 MMHG | BODY MASS INDEX: 23.24 KG/M2 | OXYGEN SATURATION: 100 % | WEIGHT: 123 LBS

## 2024-10-10 DIAGNOSIS — R20.0 FINGER NUMBNESS: ICD-10-CM

## 2024-10-10 DIAGNOSIS — R51.9 NONINTRACTABLE HEADACHE, UNSPECIFIED CHRONICITY PATTERN, UNSPECIFIED HEADACHE TYPE: ICD-10-CM

## 2024-10-10 PROCEDURE — 99283 EMERGENCY DEPT VISIT LOW MDM: CPT

## 2024-10-10 ASSESSMENT — COLUMBIA-SUICIDE SEVERITY RATING SCALE - C-SSRS
2. HAVE YOU ACTUALLY HAD ANY THOUGHTS OF KILLING YOURSELF IN THE PAST MONTH?: NO
6. HAVE YOU EVER DONE ANYTHING, STARTED TO DO ANYTHING, OR PREPARED TO DO ANYTHING TO END YOUR LIFE?: NO
1. IN THE PAST MONTH, HAVE YOU WISHED YOU WERE DEAD OR WISHED YOU COULD GO TO SLEEP AND NOT WAKE UP?: NO

## 2024-10-10 NOTE — DISCHARGE INSTRUCTIONS
You have been evaluated in the Emergency Department today for finger tingling and mild headache. Your evaluation suggests that your symptoms do not require emergent intervention at this time..    Please follow up with your primary care doctor as instructed.    Return to the ER immediately for worsening or uncontrolled symptoms, worsening headache, chest pain, shortness of breath, persistent vomiting, vision changes, fainting, or for any other concerning symptoms.    Thank you for choosing us for your care.

## 2024-10-10 NOTE — ED PROVIDER NOTES
EMERGENCY DEPARTMENT ENCOUNTER      NAME: Monika Serrano  AGE: 79 year old female  YOB: 1945  MRN: [id number]  EVALUATION DATE & TIME: No admission date for patient encounter.    PCP: Missael Fair    ED PROVIDER: Bipin Mauricio MD      Chief Complaint   Patient presents with    Headache    Numbness                FINAL IMPRESSION:  1. Finger numbness    2. Nonintractable headache, unspecified chronicity pattern, unspecified headache type          ED COURSE & MEDICAL DECISION MAKING:    Pertinent Labs & Imaging studies reviewed. (See chart for details)  79 year old female presents to the Emergency Department for evaluation of finger paresthesias.  Differential diagnosis considered Alcohol intoxication, alcohol withdrawal, electrolyte disturbance, hepatic encephalopathy, hypertensive encephalopathy, hypoglycemia, hyperglycemia, opioid overdose, uremia, traumatic blood loss, toxic ingestion, brain tumor, thyrotoxicosis, sepsis, polypharmacy, psychiatric disturbance, seizure, ischemic stroke, hemorrhagic stroke  .     ED Course as of 10/11/24 0013   Thu Oct 10, 2024   1659 I was called to the triage desk to evaluate for strokelike symptoms.  Last known normal is 330.  Patient had right-sided occipital headache and numbness and tingling in her distal right third fourth and fifth fingers.  No other deficits noted.  On my exam symptoms are rapidly improving.  No decree sensation to light touch.  Her NIH was 0.  Her headache and actually resolved.  She is well-appearing nontoxic and ambulatory  an ataxic.  No vision changes.  No sign of meningismus on exam.  I did not activate a code stroke given rapidly improving symptoms and NIH of 0.    She otherwise been feeling well.  Denies headache currently.  Offered headache medication she declined.   1659 1725 I rechecked on patient and symptoms were still not present.  I discussed possible labs including imaging and patient wanted to wait for her family.   She remains well-appearing nontoxic, asymptomatic.  She states she has history of fibromyalgia and often gets aches and pains.  Currently does not have paresthesias and again has a normal neurologic exam.  No sign of meningismus that would be concerning for meningitis.  She is not confused or altered and doubt encephalitis.  Her vital signs are reassuring outside mild hypertension.  She does have a history of hypertension and takes medications.  No recent missed doses.   1730 Family arrived and I discussed with her daughter at bedside the case so far.  Offered labs and imaging. However they decline. They would like patient to be discharge.  Believe it is reasonable to be discharged.  Symptoms seem to be fleeting, nonfocal and do not believe labs or imaging would be warranted in this clinical scenario.       Not Applicable    I discussed the plan for discharge with the patient, and patient is agreeable. We discussed supportive cares at home and reasons for return to the ER including new or worsening symptoms - all questions and concerns addressed to the best of my ability. Strict return precautions discussed. Patient to be discharged by RN.    At the conclusion of the encounter I discussed the results of all of the tests and the disposition. The questions were answered. The patient or family acknowledged understanding and was agreeable with the care plan.     MEDICATIONS GIVEN IN THE EMERGENCY:  Medications - No data to display    NEW PRESCRIPTIONS STARTED AT TODAY'S ER VISIT  Discharge Medication List as of 10/10/2024  6:12 PM        Discharge Medication List as of 10/10/2024  6:12 PM          =================================================================    HPI    Patient information was obtained from: Patient     Use of : N/A         Monika CORLEY Zach is a 79 year old female with a pertinent history of Hypertension, Hyperlipemia, and CAD, CKD, and fibromyalgia who presents to this ED for evaluation of  right sided headache and numbness in fingers.     The patient reports right sided headache and numbness to the right two middle fingers this afternoon.  Last known normal was 1530.  She is ambulatory and has no speech disturbance, motor weakness, facial droop, slurred speech, vision changes.  Symptoms are actually in proving however she became scared when her EMT friend said she may be having a stroke and EMS was called.  Patient has history of sinus infection and she had a nose bleed last night.  Patient denies any vision changes or loss. The numbness has now rapidly improved. No other complaints at this time.           PHYSICAL EXAM    BP (!) 171/99   Pulse 64   Temp 97.9  F (36.6  C) (Oral)   Resp 16   Wt 55.8 kg (123 lb)   LMP  (LMP Unknown)   SpO2 100%   BMI 23.24 kg/m    Constitutional: Well developed, well nourished. Comfortable appearing.  Head: Normocephalic, atraumatic, mucous membranes moist, nose normal.   Neck: Supple, gross ROM intact.   Eyes: Pupils mid-range, sclera white.  Respiratory: Clear to auscultation bilaterally, no respiratory distress, no wheezing, speaks full sentences easily.  Cardiovascular: Normal heart rate, regular rhythm, no murmurs. No lower extremity edema.   GI: Soft, no tenderness to deep palpation in all quadrants.  Musculoskeletal: Moving all 4 extremities intentionally and without pain. No obvious deformity.  Skin: Warm, dry, no rash.  Neurologic: Neuro: Alert and oriented, CN II-XII grossly intact, speech clear. 5/5 strength in upper and lower extremities. Sensation to light touch intact in all 4 extremities. No pronator drift. No dysmetria with finger to nose. Gait without ataxia. National Institutes of Health Stroke Scale  Exam Interval: Baseline   Score    Level of consciousness: (0)   Alert, keenly responsive    LOC questions: (0)   Answers both questions correctly    LOC commands: (0)   Performs both tasks correctly    Best gaze: (0)   Normal    Visual: (0)   No  visual loss    Facial palsy: (0)   Normal symmetrical movements    Motor arm (left): (0)   No drift    Motor arm (right): (0)   No drift    Motor leg (left): (0)   No drift    Motor leg (right): (0)   No drift    Limb ataxia: (0)   Absent    Sensory: (0)   Normal- no sensory loss    Best language: (0)   Normal- no aphasia    Dysarthria: (0)   Normal    Extinction and inattention: (0)   No abnormality        Total Score:  0       Psychiatric: Affect normal, cooperative.      LAB:  All pertinent labs reviewed and interpreted.       RADIOLOGY:  Reviewed all pertinent imaging. Please see official radiology report.  No orders to display         Bipin Mauricio MD  [facility] EMERGENCY DEPARTMENT  [facility] [facility] [address] [phone]   =================================================================    BILLING:  Data  Category 1  Non-ED record review, if applicable. External record reviewed:  Patient had a therapy visit on 09/1/24      Clinical information was obtained from an independent historian. History was obtained from: Patient, Daughter provided additional information in the HPI, and EMS provided additional information in the HPI     The following testing was considered but ultimately not selected after discussion with patient/family:  CBC, BMP, EKG, head CT however this was deferred after discussion with the family.     Category 2  My independent interpretation of EKG, rhythm strip, radiology study: N/A     Category 3  Discussion of management with other physician/healthcare provider/other source: N/A       Risk  Prescription medication was considered, but ultimately not given after discussion with patient/family: N/A     Chronic conditions affecting care: Hypertension, Hyperlipemia, and CAD, CKD, and fibromyalgia     Care significantly affected by Social Determinants of Health: N/A     Consideration of Admission/Observation: N/A              I, Let Let, am serving as a  scribe to document services personally performed by Bipin Mauricio MD based on my observation and the provider's statements to me. I, Bipin Mauricio MD, attest that Let Corin is acting in a scribe capacity, has observed my performance of the services and has documented them in accordance with my direction.     Bipin Mauricio MD  10/11/24 0018

## 2024-10-10 NOTE — ED TRIAGE NOTES
[facility] and numbness to her R middle two fingers. Numbness was noted around 1530. She reports the middle two fingers became suddenly numb The numbness is improving.     Stroke eval upon arrival. NIH of 0. Improving symptoms. Headache of 2/10.

## 2024-10-11 ENCOUNTER — PATIENT OUTREACH (OUTPATIENT)
Dept: FAMILY MEDICINE | Facility: CLINIC | Age: 79
End: 2024-10-11

## 2024-10-11 ENCOUNTER — OFFICE VISIT (OUTPATIENT)
Dept: FAMILY MEDICINE | Facility: CLINIC | Age: 79
End: 2024-10-11
Payer: COMMERCIAL

## 2024-10-11 VITALS
RESPIRATION RATE: 16 BRPM | HEART RATE: 64 BPM | SYSTOLIC BLOOD PRESSURE: 130 MMHG | OXYGEN SATURATION: 100 % | DIASTOLIC BLOOD PRESSURE: 80 MMHG | WEIGHT: 126.25 LBS | HEIGHT: 61 IN | TEMPERATURE: 98.3 F | BODY MASS INDEX: 23.84 KG/M2

## 2024-10-11 DIAGNOSIS — J01.90 ACUTE SINUSITIS WITH SYMPTOMS > 10 DAYS: Primary | ICD-10-CM

## 2024-10-11 DIAGNOSIS — Z91.09 ENVIRONMENTAL ALLERGIES: ICD-10-CM

## 2024-10-11 PROCEDURE — 99213 OFFICE O/P EST LOW 20 MIN: CPT | Performed by: FAMILY MEDICINE

## 2024-10-11 ASSESSMENT — PAIN SCALES - GENERAL: PAINLEVEL: NO PAIN (0)

## 2024-10-11 NOTE — TELEPHONE ENCOUNTER
"  FYI - Status Update    Who is Calling: patient    Update: Pt is returning a call from the clinic. Pt chart has a note stating to \"Follow up with Missael Fair MD (Internal Medicine) in 3 days (10/13/2024)\" which is a Sunday. Pt also wants to know is she suppose to come into the [place] clinic today to speak with Missael Fair MD (Internal Medicine).     Does caller want a call/response back: Yes     Could we send this information to you in [place] or would you prefer to receive a phone call?:   Patient would prefer a phone call   Okay to leave a detailed message?: Yes at Cell number on file:    Telephone Information:   Mobile [phone]     "

## 2024-10-11 NOTE — TELEPHONE ENCOUNTER
Patient is already scheduled to see Marc Villarreal today    Future Appointments 10/11/2024 - 4/9/2025        Date Visit Type Length Department Provider     10/11/2024  1:40 PM ED/HOSP FOLLOW UP 40 min  FAMILY PRACTICE Marc Arboleda MD    Location Instructions:     [facility] is located at [address] [place] [address], one mile north of the exit off of [address] [address]. From [facility], turn east on [address] [place] [address] or [facility] to access the service road that connects to the parking lot. Be sure to enter the building labeled [facility], as another [place] building is across [facility] from the clinic.&nbsp;                      Conner Willett RN  [facility]

## 2024-10-11 NOTE — PATIENT INSTRUCTIONS
Stay on cetirizine    Stay well hydrated    Hold prescription for augmentin.  Call your ENT relative.    Follow up based on symptoms

## 2024-10-11 NOTE — PROGRESS NOTES
"      Casi Frank is a 79 year old, presenting for the following health issues:  Emergency Department        10/11/2024     1:21 PM   Additional Questions   Roomed by Yesi GURROLA       ED/UC Followup:    Facility:  [facility]  Date of visit: 10/10/2024  Reason for visit: finger numbness  Current Status: stable      Last 3 fingers on right hand affected    Lasted 30-60 minutes    No pain    Headache right back of head    This is fairly typical for patient, same location    Some neck issues    Took an over the counter med with tylenol and decongestant late this am, helped some    Still some congestion and pain right side of face/ jaw/ neck     Headaches over the last year     Did see physical therapy for neck/ trapezius    Tried flonase for a month    Sinus rinses    On cetirizine daily            Objective    /80 (BP Location: Right arm, Patient Position: Chair, Cuff Size: Adult Regular)   Pulse 64   Temp 98.3  F (36.8  C) (Temporal)   Resp 16   Ht 1.549 m (5' 1\")   Wt 57.3 kg (126 lb 4 oz)   LMP  (LMP Unknown)   SpO2 100%   BMI 23.85 kg/m    Body mass index is 23.85 kg/m .  Physical Exam  Constitutional:       Appearance: She is well-developed.   HENT:      Head: Normocephalic and atraumatic.      Right Ear: Tympanic membrane, ear canal and external ear normal.      Left Ear: Tympanic membrane, ear canal and external ear normal.      Nose: Nose normal.      Mouth/Throat:      Mouth: Mucous membranes are moist.      Pharynx: Oropharynx is clear.   Eyes:      Conjunctiva/sclera: Conjunctivae normal.   Neck:      Vascular: No carotid bruit.   Cardiovascular:      Rate and Rhythm: Normal rate and regular rhythm.      Heart sounds: Normal heart sounds.   Pulmonary:      Effort: Pulmonary effort is normal. No respiratory distress.      Breath sounds: Normal breath sounds.   Neurological:      Mental Status: She is alert and oriented to person, place, and time.         Sensation/strength in " extremities normal.  Romberg, finger nose test, standing on one leg test, heel toe walk all normal.  Cranial nerves normal.  Patient has some tenderness right posterior neck and trapezius but no point tenderness    Good strength and sensation in all fingers now    Neck range of motion okay, no radiculopathy     Mild tenderness over right maxillary sinus    ASSESSMENT / PLAN:  (J01.90) Acute sinusitis with symptoms > 10 days  (primary encounter diagnosis)  Comment: discussed in detail with patient.  Reasonable to try a 2nd line antibiotic for sinusitis.  Also of note patient's son in law is ENT specialist.  She will talk with him about symptoms etc.   Plan: amoxicillin-clavulanate (AUGMENTIN) 875-125 MG         tablet               (Z91.09) Environmental allergies  Comment: advised patient continue cetirizine   Plan: as above    Follow up as needed based on symptoms     Be seen promptly if symptoms acutely worsen       I reviewed the patient's medications, allergies, medical history, family history, and social history.    Marc Arboleda MD            Signed Electronically by: Marc Arboleda MD

## 2024-10-11 NOTE — TELEPHONE ENCOUNTER
"ED / Discharge Outreach Protocol    Patient Contact    Attempt # 1    Was call answered?  No. Left voice message to return call at [phone].        Per ED:    \"Follow up with Missael Fair MD (Internal Medicine) in 3 days (10/13/2024)\"      Shari Ratliff RN   "

## 2024-10-11 NOTE — TELEPHONE ENCOUNTER
Dr. Ames and Lynda      Patient was in hospital yesterday for stroke symptoms. Stroke ruled out. Pt VERY anxious. [facility] soonest opening is the 17th.     OK to use your virtual this afternoon for in person follow up? This pt mostly needs reassurance as work up was completed at hospital visit.     Thanks,  KB Rodriguez  [facility]

## 2024-10-11 NOTE — TELEPHONE ENCOUNTER
Received call from pt. Pt thought there was an 11am appt available with Dr. Nuñez and she is wondering if she could schedule this appt. RN reviewed and there is no appt available at 1100. RN assisted with scheduling hospital follow up in person with available [facility] Provider. Note that PCP is out of office today.     Adelaida Fine RN  [facility]

## 2024-10-11 NOTE — TELEPHONE ENCOUNTER
Nedra per Dr. Ames to schedule on 10/11/2024 virtual Visit.    Lynda Wilkerson [facility]

## 2024-10-15 ENCOUNTER — TELEPHONE (OUTPATIENT)
Dept: FAMILY MEDICINE | Facility: CLINIC | Age: 79
End: 2024-10-15
Payer: COMMERCIAL

## 2024-10-15 NOTE — TELEPHONE ENCOUNTER
Patient calling to ask how long it would take the Augmentin to start helping with her sinus infection.    She started taking it the evening of 10/13/24.    Let patient know it can take up to 72 hours to see improvement in symptoms.    She will monitor her symptoms and let us know if things do not improve.    Christine M Klisch, RN

## 2024-10-25 ENCOUNTER — TRANSFERRED RECORDS (OUTPATIENT)
Dept: HEALTH INFORMATION MANAGEMENT | Facility: CLINIC | Age: 79
End: 2024-10-25
Payer: COMMERCIAL

## 2025-01-31 ENCOUNTER — TELEPHONE (OUTPATIENT)
Dept: CARDIOLOGY | Facility: CLINIC | Age: 80
End: 2025-01-31
Payer: COMMERCIAL

## 2025-01-31 NOTE — TELEPHONE ENCOUNTER
[facility] Call Center    Phone Message    May a detailed message be left on voicemail: yes     Reason for Call: Other: Patient would like to establish care with a cardiologist for s/p TAVR in [place]. She has been seeing [facility] cardiology most recently. Please call her back to schedule appt.      Action Taken: Other: cardiology    Travel Screening: Not Applicable   Thank you!  [facility]      Date of Service:

## 2025-02-03 NOTE — CONFIDENTIAL NOTE
Called and LVM for patient to return call to clinic scheduler to schedule as a new patient with Dr. Sylvester or Dr. Perez.    SABRINA Moreland

## 2025-02-10 ENCOUNTER — OFFICE VISIT (OUTPATIENT)
Dept: INTERNAL MEDICINE | Facility: CLINIC | Age: 80
End: 2025-02-10
Payer: COMMERCIAL

## 2025-02-10 VITALS
DIASTOLIC BLOOD PRESSURE: 82 MMHG | TEMPERATURE: 97.6 F | BODY MASS INDEX: 24.73 KG/M2 | SYSTOLIC BLOOD PRESSURE: 166 MMHG | RESPIRATION RATE: 16 BRPM | WEIGHT: 131 LBS | HEIGHT: 61 IN | HEART RATE: 72 BPM | OXYGEN SATURATION: 97 %

## 2025-02-10 DIAGNOSIS — H34.8312 STABLE BRANCH RETINAL VEIN OCCLUSION OF RIGHT EYE (H): ICD-10-CM

## 2025-02-10 DIAGNOSIS — I25.10 NONOBSTRUCTIVE ATHEROSCLEROSIS OF CORONARY ARTERY: ICD-10-CM

## 2025-02-10 DIAGNOSIS — F41.9 ANXIETY: ICD-10-CM

## 2025-02-10 DIAGNOSIS — R51.9 RIGHT FACIAL PAIN: ICD-10-CM

## 2025-02-10 DIAGNOSIS — N18.31 STAGE 3A CHRONIC KIDNEY DISEASE (H): ICD-10-CM

## 2025-02-10 DIAGNOSIS — M54.50 BILATERAL LOW BACK PAIN WITHOUT SCIATICA, UNSPECIFIED CHRONICITY: ICD-10-CM

## 2025-02-10 DIAGNOSIS — I25.119 CORONARY ARTERY DISEASE INVOLVING NATIVE CORONARY ARTERY OF NATIVE HEART WITH ANGINA PECTORIS: ICD-10-CM

## 2025-02-10 DIAGNOSIS — R51.9 NONINTRACTABLE HEADACHE, UNSPECIFIED CHRONICITY PATTERN, UNSPECIFIED HEADACHE TYPE: Primary | ICD-10-CM

## 2025-02-10 DIAGNOSIS — Z29.11 NEED FOR VACCINATION AGAINST RESPIRATORY SYNCYTIAL VIRUS: ICD-10-CM

## 2025-02-10 DIAGNOSIS — I10 HYPERTENSION GOAL BP (BLOOD PRESSURE) < 140/90: Chronic | ICD-10-CM

## 2025-02-10 LAB
ANION GAP SERPL CALCULATED.3IONS-SCNC: 12 MMOL/L (ref 7–15)
BUN SERPL-MCNC: 23.8 MG/DL (ref 8–23)
CALCIUM SERPL-MCNC: 9.2 MG/DL (ref 8.8–10.4)
CHLORIDE SERPL-SCNC: 98 MMOL/L (ref 98–107)
CHOLEST SERPL-MCNC: 248 MG/DL
CREAT SERPL-MCNC: 1.09 MG/DL (ref 0.51–0.95)
CREAT UR-MCNC: 42.5 MG/DL
EGFRCR SERPLBLD CKD-EPI 2021: 51 ML/MIN/1.73M2
FASTING STATUS PATIENT QL REPORTED: NO
FASTING STATUS PATIENT QL REPORTED: NO
GLUCOSE SERPL-MCNC: 91 MG/DL (ref 70–99)
HCO3 SERPL-SCNC: 23 MMOL/L (ref 22–29)
HDLC SERPL-MCNC: 60 MG/DL
HGB BLD-MCNC: 13.3 G/DL (ref 11.7–15.7)
LDLC SERPL CALC-MCNC: 166 MG/DL
MICROALBUMIN UR-MCNC: 14.6 MG/L
MICROALBUMIN/CREAT UR: 34.35 MG/G CR (ref 0–25)
NONHDLC SERPL-MCNC: 188 MG/DL
POTASSIUM SERPL-SCNC: 4.6 MMOL/L (ref 3.4–5.3)
SODIUM SERPL-SCNC: 133 MMOL/L (ref 135–145)
TRIGL SERPL-MCNC: 111 MG/DL

## 2025-02-10 PROCEDURE — 36415 COLL VENOUS BLD VENIPUNCTURE: CPT | Performed by: INTERNAL MEDICINE

## 2025-02-10 PROCEDURE — 80048 BASIC METABOLIC PNL TOTAL CA: CPT | Performed by: INTERNAL MEDICINE

## 2025-02-10 PROCEDURE — 80061 LIPID PANEL: CPT | Performed by: INTERNAL MEDICINE

## 2025-02-10 PROCEDURE — G2211 COMPLEX E/M VISIT ADD ON: HCPCS | Performed by: INTERNAL MEDICINE

## 2025-02-10 PROCEDURE — 85018 HEMOGLOBIN: CPT | Performed by: INTERNAL MEDICINE

## 2025-02-10 PROCEDURE — 82043 UR ALBUMIN QUANTITATIVE: CPT | Performed by: INTERNAL MEDICINE

## 2025-02-10 PROCEDURE — 82570 ASSAY OF URINE CREATININE: CPT | Performed by: INTERNAL MEDICINE

## 2025-02-10 PROCEDURE — 99214 OFFICE O/P EST MOD 30 MIN: CPT | Performed by: INTERNAL MEDICINE

## 2025-02-10 RX ORDER — DULOXETIN HYDROCHLORIDE 30 MG/1
30 CAPSULE, DELAYED RELEASE ORAL DAILY
Status: SHIPPED
Start: 2025-02-10

## 2025-02-10 RX ORDER — METOPROLOL SUCCINATE 50 MG/1
75 TABLET, EXTENDED RELEASE ORAL DAILY
Qty: 135 TABLET | Refills: 3 | Status: SHIPPED | OUTPATIENT
Start: 2025-02-10

## 2025-02-10 RX ORDER — LOSARTAN POTASSIUM 50 MG/1
50 TABLET ORAL DAILY
Qty: 90 TABLET | Refills: 3 | Status: SHIPPED | OUTPATIENT
Start: 2025-02-10

## 2025-02-10 RX ORDER — HYDROCHLOROTHIAZIDE 12.5 MG/1
12.5 TABLET ORAL DAILY
Qty: 90 TABLET | Refills: 3 | Status: SHIPPED | OUTPATIENT
Start: 2025-02-10

## 2025-02-10 ASSESSMENT — ENCOUNTER SYMPTOMS: HEADACHES: 1

## 2025-02-10 NOTE — Clinical Note
Dear people ,   I was given a warning fr this to with DULoxetine (CYMBALTA) - it flags as not indicated  Can you speak to any concerning drug-drug interaction (DDI) with this patient with Cymbalta (duloxetine)   Missael Fair MD

## 2025-02-10 NOTE — PATIENT INSTRUCTIONS
Nice to0 see you today     We continue to have some uneasiness as we track your blood pressure / I suggest an medical assistant blood pressure recheck  here at the clinic in one month.

## 2025-02-10 NOTE — LETTER
February 11, 2025      Monika Serrano  [address]   [place] [address]        Dear Monika:    We are writing to inform you of your test results.    All of these tests are within acceptable limits, things look good ! Please let me know if you have any questions for me about all of these lab tests.     Resulted Orders   BASIC METABOLIC PANEL   Result Value Ref Range    Sodium 133 (L) 135 - 145 mmol/L    Potassium 4.6 3.4 - 5.3 mmol/L    Chloride 98 98 - 107 mmol/L    Carbon Dioxide (CO2) 23 22 - 29 mmol/L    Anion Gap 12 7 - 15 mmol/L    Urea Nitrogen 23.8 (H) 8.0 - 23.0 mg/dL    Creatinine 1.09 (H) 0.51 - 0.95 mg/dL    GFR Estimate 51 (L) >60 mL/min/1.73m2      Comment:      eGFR calculated using 2021 CKD-EPI equation.    Calcium 9.2 8.8 - 10.4 mg/dL    Glucose 91 70 - 99 mg/dL    Patient Fasting > 8hrs? No    Lipid panel reflex to direct LDL Non-fasting   Result Value Ref Range    Cholesterol 248 (H) <200 mg/dL    Triglycerides 111 <150 mg/dL    Direct Measure HDL 60 >=50 mg/dL    LDL Cholesterol Calculated 166 (H) <100 mg/dL    Non HDL Cholesterol 188 (H) <130 mg/dL    Patient Fasting > 8hrs? No     Narrative    Cholesterol  Desirable: < 200 mg/dL  Borderline High: 200 - 239 mg/dL  High: >= 240 mg/dL    Triglycerides  Normal: < 150 mg/dL  Borderline High: 150 - 199 mg/dL  High: 200-499 mg/dL  Very High: >= 500 mg/dL    Direct Measure HDL  Female: >= 50 mg/dL   Male: >= 40 mg/dL    LDL Cholesterol  Desirable: < 100 mg/dL  Above Desirable: 100 - 129 mg/dL   Borderline High: 130 - 159 mg/dL   High:  160 - 189 mg/dL   Very High: >= 190 mg/dL    Non HDL Cholesterol  Desirable: < 130 mg/dL  Above Desirable: 130 - 159 mg/dL  Borderline High: 160 - 189 mg/dL  High: 190 - 219 mg/dL  Very High: >= 220 mg/dL   Albumin Random Urine Quantitative with Creat Ratio   Result Value Ref Range    Creatinine Urine mg/dL 42.5 mg/dL      Comment:      The reference ranges have not been established in urine  creatinine. The results should be integrated into the clinical context for interpretation.    Albumin Urine mg/L 14.6 mg/L      Comment:      The reference ranges have not been established in urine albumin. The results should be integrated into the clinical context for interpretation.    Albumin Urine mg/g Cr 34.35 (H) 0.00 - 25.00 mg/g Cr      Comment:      Microalbuminuria is defined as an albumin:creatinine ratio of 17 to 299 for males and 25 to 299 for females. A ratio of albumin:creatinine of 300 or higher is indicative of overt proteinuria.  Due to biologic variability, positive results should be confirmed by a second, first-morning random or 24-hour timed urine specimen. If there is discrepancy, a third specimen is recommended. When 2 out of 3 results are in the microalbuminuria range, this is evidence for incipient nephropathy and warrants increased efforts at glucose control, blood pressure control, and institution of therapy with an angiotensin-converting-enzyme (ACE) inhibitor (if the patient can tolerate it).     Hemoglobin   Result Value Ref Range    Hemoglobin 13.3 11.7 - 15.7 g/dL   If you have any questions or concerns, please call the clinic at the number listed above.     Sincerely,      Missael Fair MD/yael    Electronically signed

## 2025-02-10 NOTE — PROGRESS NOTES
"  Assessment & Plan     Nonintractable headache, unspecified chronicity pattern, unspecified headache type  Right facial pain  Ongoing, without red flag symptoms. Patient concerned symptoms represent trigeminal neuralgia. Reviewed with patient her symptoms are not consistent with trigeminal neuralgia. Additionally, reviewed CT ordered by ENT for facial pain work up, which found no acute findings, which is reassuring. Suspect headaches are musculoskeletal in origin, encouraged continuation of current plan of care including acetaminophen/ ibuprophen and acupuncture.   - REVIEW OF HEALTH MAINTENANCE PROTOCOL ORDERS    Bilateral low back pain without sciatica, unspecified chronicity  Ongoing, with recent increase in severity. Again, suspect musculoskeletal in nature based on presentation. Encouraged continuation of current plan of care of acetaminophen/ ibuprofen, acupuncture, and discussed adding topical treatments such as Icy Hot.    Anxiety  Patient started treatment two months ago. She reports having anxious thoughts at night, and feels the medication decreases these thoughts. We did receive an \" alert\" when we entered this as a chronic medication [ was started by a psychiatrist ] . Based on this warning I had the Cymbalta (duloxetine) karina reviewed by one of our medication therapy management pharmacists and conclude this is primarily listed due to patients renal function which is not normal. We did order a basic metabolic panel today and if her gfr [ glomerular filtration rate ] has truly dropped below 30 we will need ongoing monitoring  of this with possibly stopping this medication or decreasing the dose at any rate.  - DULoxetine (CYMBALTA) 30 MG capsule    Hypertension goal BP (blood pressure) < 140/90  Ongoing, changed losartan dosage to 50 mg tablet \" take one dose daily\" , what she had been doing prior was taking \"1/2 of a 100 milligram tab \" to reflect how patient has been administering medication at " "home.   - hydroCHLOROthiazide 12.5 MG tablet  Dispense: 90 tablet; Refill: 3  - metoprolol succinate ER (TOPROL XL) 50 MG 24 hr tablet  Dispense: 135 tablet; Refill: 3  - losartan (COZAAR) 50 MG tablet  Dispense: 90 tablet; Refill: 3    Stage 3a chronic kidney disease (H)  Noted, patient due for labs.   - BASIC METABOLIC PANEL  - Albumin Random Urine Quantitative with Creat Ratio  - Hemoglobin  - BASIC METABOLIC PANEL  - Albumin Random Urine Quantitative with Creat Ratio  - Hemoglobin    Coronary artery disease involving native coronary artery of native heart with angina pectoris  Nonobstructive atherosclerosis of coronary artery  Per chart review, she is following with Dr. Peng Arce s/p TAVR. Plan to assess lipids today.  - Lipid panel reflex to direct LDL Non-fasting  - Lipid panel reflex to direct LDL Non-fasting  - metoprolol succinate ER (TOPROL XL) 50 MG 24 hr tablet  Dispense: 135 tablet; Refill: 3    Stable branch retinal vein occlusion of right eye (H)  Ongoing, following with retinal specialist.    Need for vaccination against respiratory syncytial virus  Declines , needs to be given at the pharmacy         Subjective   Monika is a 79 year old, presenting for the following health issues:  Headache (Headaches on back on head )      2/10/2025    11:47 AM   Additional Questions   Roomed by gene     Headache     History of Present Illness       Headaches:   Since the patient's last clinic visit, headaches are: improved  The patient is getting headaches:  Several rimes a week  She is able to do normal daily activities when she has a migraine.  The patient is taking the following rescue/relief medications:  Ibuprofen (Advil, Motrin) and Tylenol   Patient states \"I get some relief\" from the rescue/relief medications.   The patient is taking the following medications to prevent migraines:  No medications to prevent migraines  In the past 4 weeks, the patient has gone to an Urgent Care or Emergency Room 0 " "times times due to headaches.   She is taking medications regularly.     Patient reports having headaches worsening over the past couple of months. Has a history of frontal and maxillary sinus pain which was evaluated by ENT with head CT, findings were negative, and she was referred to neurology, which she has not done. More recently her headaches have been in the right frontal sinus and the right occipital area, radiating down right trapezius.current this pain is 2/10, at worst it is 5/10. It is dull with an occasional \"twinge\", intermittent, and most bothersome at night. It is relieved with acetaminophen and ibuprofen which allow her to sleep. She notes dizziness with positional changes (supine to sitting, bending over) and one episode of epistaxis two weeks ago that she associates with the headache. She denies nausea, vomiting, light sensitivity, visual disturbances, double vision, paresthesias, or aphasia with headache.    Her back pain has been ongoing for about a year, but stated to bother her sometime in the fall. It is sharp, worse with walking at an incline. Pain is 3-5/10. Pain begins in low back and radiates down right lateral leg, stopping above knee. She has trialled foam rolling the leg without improvement. Massage and vibrating table improves pain. She denies recent trauma, but had a fall in September 2024 after tripping in her garage. She does not feel this pain is related to that incident.       BP Readings from Last 6 Encounters:   02/10/25 (!) 166/82   10/11/24 130/80   10/10/24 (!) 171/99   07/31/24 118/62   05/30/24 (!) 154/83   02/23/24 (!) 183/92         Review of Systems  Constitutional, HEENT, cardiovascular, pulmonary, gi and gu systems are negative, except as otherwise noted.      Objective    BP (!) 166/82   Pulse 72   Temp 97.6  F (36.4  C) (Temporal)   Resp 16   Ht 1.549 m (5' 1\")   Wt 59.4 kg (131 lb)   LMP  (LMP Unknown)   SpO2 97%   BMI 24.75 kg/m    Body mass index is 24.75 " kg/m .  Physical Exam   GENERAL: alert and no distress  HENT: normal cephalic/atraumatic, ear canals and TM's normal, and sinuses: maxillary, frontal tenderness on right  MS: no gross musculoskeletal defects noted, no edema  NEURO: Normal strength and tone, mentation intact and speech normal  Comprehensive back pain exam:  No tenderness and Pain limits the following motions: right and left cervical rotation, cervical flexion       Orders Placed This Encounter   Procedures    REVIEW OF HEALTH MAINTENANCE PROTOCOL ORDERS    BASIC METABOLIC PANEL    Lipid panel reflex to direct LDL Non-fasting    Albumin Random Urine Quantitative with Creat Ratio    Hemoglobin             Zuhair Garcia DNP Student 2/10/2025 1:42 PM    This patient office visit today was staffed with me, I did review the entire clinical presentation and history, exam and medical decision making with [employer] student Zuhair Garcia. I agree with and have approved the office visit entirely. Patient seen with me and [employer] student Zuhair Garcia.together today. I agree with assessment and plans.    Signed Electronically by: Missael Fair MD

## 2025-02-23 DIAGNOSIS — N18.31 STAGE 3A CHRONIC KIDNEY DISEASE (H): ICD-10-CM

## 2025-02-23 DIAGNOSIS — I10 HYPERTENSION GOAL BP (BLOOD PRESSURE) < 140/90: Chronic | ICD-10-CM

## 2025-02-23 RX ORDER — LOSARTAN POTASSIUM 100 MG/1
TABLET ORAL
Qty: 45 TABLET | Refills: 1 | Status: SHIPPED | OUTPATIENT
Start: 2025-02-23

## 2025-03-01 ENCOUNTER — HEALTH MAINTENANCE LETTER (OUTPATIENT)
Age: 80
End: 2025-03-01

## 2025-03-18 DIAGNOSIS — I10 HYPERTENSION GOAL BP (BLOOD PRESSURE) < 140/90: Chronic | ICD-10-CM

## 2025-03-18 DIAGNOSIS — I25.10 NONOBSTRUCTIVE ATHEROSCLEROSIS OF CORONARY ARTERY: ICD-10-CM

## 2025-03-18 RX ORDER — HYDROCHLOROTHIAZIDE 12.5 MG/1
12.5 TABLET ORAL DAILY
Qty: 90 TABLET | Refills: 2 | Status: SHIPPED | OUTPATIENT
Start: 2025-03-18

## 2025-03-18 RX ORDER — METOPROLOL SUCCINATE 50 MG/1
75 TABLET, EXTENDED RELEASE ORAL DAILY
Qty: 135 TABLET | Refills: 2 | Status: SHIPPED | OUTPATIENT
Start: 2025-03-18

## 2025-05-08 ENCOUNTER — DOCUMENTATION ONLY (OUTPATIENT)
Dept: SLEEP MEDICINE | Facility: CLINIC | Age: 80
End: 2025-05-08
Payer: COMMERCIAL

## 2025-05-08 DIAGNOSIS — G47.33 OSA (OBSTRUCTIVE SLEEP APNEA): Primary | ICD-10-CM

## 2025-05-16 ENCOUNTER — DOCUMENTATION ONLY (OUTPATIENT)
Dept: SLEEP MEDICINE | Facility: CLINIC | Age: 80
End: 2025-05-16
Payer: COMMERCIAL

## 2025-05-16 DIAGNOSIS — G47.33 OSA (OBSTRUCTIVE SLEEP APNEA): Primary | ICD-10-CM

## 2025-05-19 DIAGNOSIS — G47.33 OSA (OBSTRUCTIVE SLEEP APNEA): Primary | ICD-10-CM

## 2025-05-19 DIAGNOSIS — F51.04 CHRONIC INSOMNIA: ICD-10-CM
